# Patient Record
Sex: MALE | Race: WHITE | NOT HISPANIC OR LATINO | Employment: OTHER | URBAN - METROPOLITAN AREA
[De-identification: names, ages, dates, MRNs, and addresses within clinical notes are randomized per-mention and may not be internally consistent; named-entity substitution may affect disease eponyms.]

---

## 2020-03-13 ENCOUNTER — OFFICE VISIT (OUTPATIENT)
Dept: FAMILY MEDICINE | Facility: CLINIC | Age: 60
End: 2020-03-13
Payer: COMMERCIAL

## 2020-03-13 VITALS
WEIGHT: 196 LBS | HEART RATE: 76 BPM | HEIGHT: 72 IN | DIASTOLIC BLOOD PRESSURE: 84 MMHG | SYSTOLIC BLOOD PRESSURE: 122 MMHG | BODY MASS INDEX: 26.55 KG/M2

## 2020-03-13 DIAGNOSIS — F33.42 RECURRENT MAJOR DEPRESSIVE DISORDER, IN FULL REMISSION: Primary | ICD-10-CM

## 2020-03-13 DIAGNOSIS — N40.0 BENIGN PROSTATIC HYPERPLASIA WITHOUT LOWER URINARY TRACT SYMPTOMS: ICD-10-CM

## 2020-03-13 DIAGNOSIS — Z00.00 GENERAL MEDICAL EXAM: ICD-10-CM

## 2020-03-13 DIAGNOSIS — F51.01 PRIMARY INSOMNIA: ICD-10-CM

## 2020-03-13 PROBLEM — G47.00 INSOMNIA: Status: ACTIVE | Noted: 2020-03-13

## 2020-03-13 PROCEDURE — 99204 PR OFFICE/OUTPT VISIT, NEW, LEVL IV, 45-59 MIN: ICD-10-PCS | Mod: S$GLB,,, | Performed by: NURSE PRACTITIONER

## 2020-03-13 PROCEDURE — 3008F BODY MASS INDEX DOCD: CPT | Mod: S$GLB,,, | Performed by: NURSE PRACTITIONER

## 2020-03-13 PROCEDURE — 3008F PR BODY MASS INDEX (BMI) DOCUMENTED: ICD-10-PCS | Mod: S$GLB,,, | Performed by: NURSE PRACTITIONER

## 2020-03-13 PROCEDURE — 99204 OFFICE O/P NEW MOD 45 MIN: CPT | Mod: S$GLB,,, | Performed by: NURSE PRACTITIONER

## 2020-03-13 RX ORDER — ZOLPIDEM TARTRATE 5 MG/1
5 TABLET ORAL NIGHTLY
Qty: 90 TABLET | Refills: 1 | Status: SHIPPED | OUTPATIENT
Start: 2020-03-13 | End: 2020-09-03 | Stop reason: SDUPTHER

## 2020-03-13 RX ORDER — BUPROPION HYDROCHLORIDE 300 MG/1
300 TABLET ORAL DAILY
Qty: 90 TABLET | Refills: 1 | Status: SHIPPED | OUTPATIENT
Start: 2020-03-13 | End: 2020-09-14 | Stop reason: SDUPTHER

## 2020-03-13 RX ORDER — BUPROPION HYDROCHLORIDE 300 MG/1
1 TABLET ORAL DAILY
COMMUNITY
Start: 2019-12-14 | End: 2020-03-13 | Stop reason: SDUPTHER

## 2020-03-13 RX ORDER — ZOLPIDEM TARTRATE 5 MG/1
5 TABLET ORAL NIGHTLY
COMMUNITY
End: 2020-03-13 | Stop reason: SDUPTHER

## 2020-03-13 RX ORDER — OMEPRAZOLE 20 MG/1
20 TABLET, DELAYED RELEASE ORAL DAILY PRN
COMMUNITY
End: 2022-06-26 | Stop reason: CLARIF

## 2020-03-13 NOTE — PROGRESS NOTES
SUBJECTIVE:    Patient ID: Jean Bates is a 59 y.o. male.    Chief Complaint: [facility] (brought bottles// SW)    Pt presents to [facility]. Moved from [facility] about 6 months ago. History includes BPH and elevated PSA. Has seen Urology in the past and is requesting new referral. He is  and works part time as a [employer] . States he also has gym membership and works out 2-3 times per week. Had colonoscopy in 2018. No recent lab work.         No results found for any previous visit.       History reviewed. No pertinent past medical history.  Past Surgical History:   Procedure Laterality Date    HERNIA REPAIR       History reviewed. No pertinent family history.    Marital Status:   Alcohol History:  reports that he drinks alcohol.  Tobacco History:  reports that he has never smoked. He has never used smokeless tobacco.  Drug History:  has no drug history on file.    Review of patient's allergies indicates:  No Known Allergies    Current Outpatient Medications:     buPROPion (WELLBUTRIN XL) 300 MG 24 hr tablet, Take 1 tablet (300 mg total) by mouth once daily., Disp: 90 tablet, Rfl: 1    omeprazole (PRILOSEC OTC) 20 MG tablet, Take 20 mg by mouth daily as needed., Disp: , Rfl:     zolpidem (AMBIEN) 5 MG Tab, Take 1 tablet (5 mg total) by mouth every evening., Disp: 90 tablet, Rfl: 1    Review of Systems   Constitutional: Negative.  Negative for diaphoresis.   HENT: Negative.  Negative for congestion, ear pain, sinus pressure and sinus pain.    Eyes: Negative for pain and redness.   Respiratory: Negative for chest tightness and shortness of breath.    Cardiovascular: Negative for chest pain and palpitations.        Felt feeling in center of chest radiated to back and felt a little lightheaded. Lasted < one hour   Gastrointestinal: Negative for abdominal pain, nausea and vomiting.   Genitourinary: Negative for difficulty urinating, dysuria, frequency and urgency.    Musculoskeletal: Positive for back pain (chronic back issues). Negative for arthralgias and myalgias.   Skin: Negative for rash.   Allergic/Immunologic: Negative.    Neurological: Negative for dizziness, weakness, light-headedness and headaches.   Psychiatric/Behavioral: Negative.           Objective:      Vitals:    03/13/20 0953   BP: 122/84   Pulse: 76   Weight: 88.9 kg (196 lb)   Height: 6' (1.829 m)     Body mass index is 26.58 kg/m².  Physical Exam   Constitutional: He is oriented to person, place, and time. He appears well-developed and well-nourished. No distress.   HENT:   Head: Normocephalic and atraumatic.   Right Ear: Tympanic membrane normal.   Left Ear: Tympanic membrane normal.   Nose: Nose normal. No mucosal edema or nasal deformity.   Mouth/Throat: Oropharynx is clear and moist and mucous membranes are normal. No dental abscesses or dental caries.   Eyes: Pupils are equal, round, and reactive to light. Conjunctivae, EOM and lids are normal.   Neck: Normal range of motion. No JVD present. No tracheal tenderness present.   Cardiovascular: Normal rate, regular rhythm and normal heart sounds.   No murmur heard.  Pulmonary/Chest: Effort normal and breath sounds normal. No accessory muscle usage. No respiratory distress. He has no rhonchi.   Abdominal: Soft. Normal appearance and bowel sounds are normal. There is no tenderness.   Musculoskeletal: Normal range of motion. He exhibits no tenderness.   Neurological: He is alert and oriented to person, place, and time.   Skin: Skin is warm and dry. Capillary refill takes less than 2 seconds. No bruising and no ecchymosis noted. No erythema.   Psychiatric: He has a normal mood and affect. Cognition and memory are normal.   Vitals reviewed.        Assessment:       1. Recurrent major depressive disorder, in full remission    2. Benign prostatic hyperplasia without lower urinary tract symptoms    3. General medical exam    4. Primary insomnia    5. BMI  26.0-26.9,adult         Plan:       Recurrent major depressive disorder, in full remission  -     buPROPion (WELLBUTRIN XL) 300 MG 24 hr tablet; Take 1 tablet (300 mg total) by mouth once daily.  Dispense: 90 tablet; Refill: 1    Benign prostatic hyperplasia without lower urinary tract symptoms  -     Ambulatory referral/consult to Urology; Future; Expected date: 03/20/2020    General medical exam  -     CBC auto differential; Future; Expected date: 03/13/2020  -     Comprehensive metabolic panel; Future; Expected date: 03/13/2020  -     Lipid panel; Future; Expected date: 03/13/2020  -     PSA, Screening; Future; Expected date: 03/13/2020  -     TSH; Future; Expected date: 03/13/2020    Primary insomnia  -     zolpidem (AMBIEN) 5 MG Tab; Take 1 tablet (5 mg total) by mouth every evening.  Dispense: 90 tablet; Refill: 1    BMI 26.0-26.9,adult    Patient doing well. Plan for one yr return unless lab work is abnormal.  Follow up in about 1 year (around 3/13/2021) for Annual Physical.

## 2020-03-14 LAB
ALBUMIN SERPL-MCNC: 4.3 G/DL (ref 3.6–5.1)
ALBUMIN/GLOB SERPL: 2 (CALC) (ref 1–2.5)
ALP SERPL-CCNC: 75 U/L (ref 35–144)
ALT SERPL-CCNC: 17 U/L (ref 9–46)
AST SERPL-CCNC: 17 U/L (ref 10–35)
BASOPHILS # BLD AUTO: 62 CELLS/UL (ref 0–200)
BASOPHILS NFR BLD AUTO: 0.8 %
BILIRUB SERPL-MCNC: 0.9 MG/DL (ref 0.2–1.2)
BUN SERPL-MCNC: 13 MG/DL (ref 7–25)
BUN/CREAT SERPL: NORMAL (CALC) (ref 6–22)
CALCIUM SERPL-MCNC: 9.8 MG/DL (ref 8.6–10.3)
CHLORIDE SERPL-SCNC: 104 MMOL/L (ref 98–110)
CHOLEST SERPL-MCNC: 184 MG/DL
CHOLEST/HDLC SERPL: 3.5 (CALC)
CO2 SERPL-SCNC: 29 MMOL/L (ref 20–32)
CREAT SERPL-MCNC: 1.02 MG/DL (ref 0.7–1.33)
EOSINOPHIL # BLD AUTO: 131 CELLS/UL (ref 15–500)
EOSINOPHIL NFR BLD AUTO: 1.7 %
ERYTHROCYTE [DISTWIDTH] IN BLOOD BY AUTOMATED COUNT: 12.1 % (ref 11–15)
GFRSERPLBLD MDRD-ARVRAT: 81 ML/MIN/1.73M2
GLOBULIN SER CALC-MCNC: 2.2 G/DL (CALC) (ref 1.9–3.7)
GLUCOSE SERPL-MCNC: 86 MG/DL (ref 65–99)
HCT VFR BLD AUTO: 48.2 % (ref 38.5–50)
HDLC SERPL-MCNC: 53 MG/DL
HGB BLD-MCNC: 16.2 G/DL (ref 13.2–17.1)
LDLC SERPL CALC-MCNC: 106 MG/DL (CALC)
LYMPHOCYTES # BLD AUTO: 1609 CELLS/UL (ref 850–3900)
LYMPHOCYTES NFR BLD AUTO: 20.9 %
MCH RBC QN AUTO: 31.4 PG (ref 27–33)
MCHC RBC AUTO-ENTMCNC: 33.6 G/DL (ref 32–36)
MCV RBC AUTO: 93.4 FL (ref 80–100)
MONOCYTES # BLD AUTO: 639 CELLS/UL (ref 200–950)
MONOCYTES NFR BLD AUTO: 8.3 %
NEUTROPHILS # BLD AUTO: 5259 CELLS/UL (ref 1500–7800)
NEUTROPHILS NFR BLD AUTO: 68.3 %
NONHDLC SERPL-MCNC: 131 MG/DL (CALC)
PLATELET # BLD AUTO: 247 THOUSAND/UL (ref 140–400)
PMV BLD REES-ECKER: 10.3 FL (ref 7.5–12.5)
POTASSIUM SERPL-SCNC: 5 MMOL/L (ref 3.5–5.3)
PROT SERPL-MCNC: 6.5 G/DL (ref 6.1–8.1)
PSA SERPL-MCNC: 4.1 NG/ML
RBC # BLD AUTO: 5.16 MILLION/UL (ref 4.2–5.8)
SODIUM SERPL-SCNC: 140 MMOL/L (ref 135–146)
TRIGL SERPL-MCNC: 135 MG/DL
TSH SERPL-ACNC: 2.25 MIU/L (ref 0.4–4.5)
WBC # BLD AUTO: 7.7 THOUSAND/UL (ref 3.8–10.8)

## 2020-03-16 ENCOUNTER — TELEPHONE (OUTPATIENT)
Dept: FAMILY MEDICINE | Facility: CLINIC | Age: 60
End: 2020-03-16

## 2020-03-16 NOTE — TELEPHONE ENCOUNTER
----- Message from Mariza Mckee NP sent at 3/16/2020  9:37 AM CDT -----  Blood counts are normal. Liver and kidney function is normal. Bad cholesterol is mildly elevated but not concerning at this time. Thyroid function is normal. PSA is elevated, as we discussed it would be. Urology referral was placed during appointment.

## 2020-03-16 NOTE — PROGRESS NOTES
Blood counts are normal. Liver and kidney function is normal. Bad cholesterol is mildly elevated but not concerning at this time. Thyroid function is normal. PSA is elevated, as we discussed it would be. Urology referral was placed during appointment.

## 2020-06-23 ENCOUNTER — TELEPHONE (OUTPATIENT)
Dept: FAMILY MEDICINE | Facility: CLINIC | Age: 60
End: 2020-06-23

## 2020-06-23 ENCOUNTER — OFFICE VISIT (OUTPATIENT)
Dept: FAMILY MEDICINE | Facility: CLINIC | Age: 60
End: 2020-06-23
Payer: COMMERCIAL

## 2020-06-23 VITALS
DIASTOLIC BLOOD PRESSURE: 80 MMHG | SYSTOLIC BLOOD PRESSURE: 128 MMHG | WEIGHT: 199 LBS | HEART RATE: 72 BPM | HEIGHT: 72 IN | TEMPERATURE: 99 F | BODY MASS INDEX: 26.95 KG/M2

## 2020-06-23 DIAGNOSIS — T14.8XXA MUSCLE STRAIN: ICD-10-CM

## 2020-06-23 DIAGNOSIS — R07.9 CHEST PAIN, UNSPECIFIED TYPE: Primary | ICD-10-CM

## 2020-06-23 LAB — EKG 12-LEAD: NORMAL

## 2020-06-23 PROCEDURE — 93000 ELECTROCARDIOGRAM COMPLETE: CPT | Mod: S$GLB,,, | Performed by: NURSE PRACTITIONER

## 2020-06-23 PROCEDURE — 99213 OFFICE O/P EST LOW 20 MIN: CPT | Mod: 25,S$GLB,, | Performed by: NURSE PRACTITIONER

## 2020-06-23 PROCEDURE — 3008F BODY MASS INDEX DOCD: CPT | Mod: S$GLB,,, | Performed by: NURSE PRACTITIONER

## 2020-06-23 PROCEDURE — 3008F PR BODY MASS INDEX (BMI) DOCUMENTED: ICD-10-PCS | Mod: S$GLB,,, | Performed by: NURSE PRACTITIONER

## 2020-06-23 PROCEDURE — 93000 POCT EKG 12-LEAD: ICD-10-PCS | Mod: S$GLB,,, | Performed by: NURSE PRACTITIONER

## 2020-06-23 PROCEDURE — 99213 PR OFFICE/OUTPT VISIT, EST, LEVL III, 20-29 MIN: ICD-10-PCS | Mod: 25,S$GLB,, | Performed by: NURSE PRACTITIONER

## 2020-06-23 RX ORDER — METHOCARBAMOL 750 MG/1
750 TABLET, FILM COATED ORAL 3 TIMES DAILY
Qty: 30 TABLET | Refills: 0 | Status: SHIPPED | OUTPATIENT
Start: 2020-06-23 | End: 2020-07-03

## 2020-06-23 NOTE — TELEPHONE ENCOUNTER
"----- Message from Marilee Yancey MA sent at 6/23/2020 10:56 AM CDT -----  Regarding: Appointment Request  Pt called in to request an appt ASAP.  States he has been "having pains in the center of his check, as well as in his back, between the shoulder blades".  Report is has been going on for about 1 week.  Denies SOB, fever, pain in arms, or heart palpitations.  Please contact pt to advise and schedule.    Pt - [phone]    "

## 2020-06-24 NOTE — PROGRESS NOTES
SUBJECTIVE:    Patient ID: Jean Bates is a 60 y.o. male.    Chief Complaint: Chest Pain and Back Pain (x 1 week)    Pt presents with midline, upper back pain along with some sternal pains. Chest pain does not radiate. Has been hurting off and on for about a week. Thinks it started after working too hard at the gym last week. Denies nausea, burning, dizziness, palpitations. No cardiac history and no family cardiac history. Takes omeprazole daily for acid reflux. Stretching and moving in different directions helps with the back pain. See chiropractor. Saw last week just before symptoms began.       Office Visit on 06/23/2020   Component Date Value Ref Range Status    EKG 12-Lead 06/23/2020 NSR   Final   Office Visit on 03/13/2020   Component Date Value Ref Range Status    WBC 03/13/2020 7.7  3.8 - 10.8 Thousand/uL Final    RBC 03/13/2020 5.16  4.20 - 5.80 Million/uL Final    Hemoglobin 03/13/2020 16.2  13.2 - 17.1 g/dL Final    Hematocrit 03/13/2020 48.2  38.5 - 50.0 % Final    Mean Corpuscular Volume 03/13/2020 93.4  80.0 - 100.0 fL Final    Mean Corpuscular Hemoglobin 03/13/2020 31.4  27.0 - 33.0 pg Final    Mean Corpuscular Hemoglobin Conc 03/13/2020 33.6  32.0 - 36.0 g/dL Final    RDW 03/13/2020 12.1  11.0 - 15.0 % Final    Platelets 03/13/2020 247  140 - 400 Thousand/uL Final    MPV 03/13/2020 10.3  7.5 - 12.5 fL Final    Neutrophils Absolute 03/13/2020 5,259  1,500 - 7,800 cells/uL Final    Lymph # 03/13/2020 1,609  850 - 3,900 cells/uL Final    Mono # 03/13/2020 639  200 - 950 cells/uL Final    Eos # 03/13/2020 131  15 - 500 cells/uL Final    Baso # 03/13/2020 62  0 - 200 cells/uL Final    Neutrophils Relative 03/13/2020 68.3  % Final    Lymph% 03/13/2020 20.9  % Final    Mono% 03/13/2020 8.3  % Final    Eosinophil% 03/13/2020 1.7  % Final    Basophil% 03/13/2020 0.8  % Final    Glucose 03/13/2020 86  65 - 99 mg/dL Final    BUN, Bld 03/13/2020 13  7 - 25 mg/dL Final    Creatinine  03/13/2020 1.02  0.70 - 1.33 mg/dL Final    eGFR if non African American 03/13/2020 81  > OR = 60 mL/min/1.73m2 Final    eGFR if African American 03/13/2020 93  > OR = 60 mL/min/1.73m2 Final    BUN/Creatinine Ratio 03/13/2020 NOT APPLICABLE  6 - 22 (calc) Final    Sodium 03/13/2020 140  135 - 146 mmol/L Final    Potassium 03/13/2020 5.0  3.5 - 5.3 mmol/L Final    Chloride 03/13/2020 104  98 - 110 mmol/L Final    CO2 03/13/2020 29  20 - 32 mmol/L Final    Calcium 03/13/2020 9.8  8.6 - 10.3 mg/dL Final    Total Protein 03/13/2020 6.5  6.1 - 8.1 g/dL Final    Albumin 03/13/2020 4.3  3.6 - 5.1 g/dL Final    Globulin, Total 03/13/2020 2.2  1.9 - 3.7 g/dL (calc) Final    Albumin/Globulin Ratio 03/13/2020 2.0  1.0 - 2.5 (calc) Final    Total Bilirubin 03/13/2020 0.9  0.2 - 1.2 mg/dL Final    Alkaline Phosphatase 03/13/2020 75  35 - 144 U/L Final    AST 03/13/2020 17  10 - 35 U/L Final    ALT 03/13/2020 17  9 - 46 U/L Final    Cholesterol 03/13/2020 184  <200 mg/dL Final    HDL 03/13/2020 53  > OR = 40 mg/dL Final    Triglycerides 03/13/2020 135  <150 mg/dL Final    LDL Cholesterol 03/13/2020 106* mg/dL (calc) Final    Hdl/Cholesterol Ratio 03/13/2020 3.5  <5.0 (calc) Final    Non HDL Chol. (LDL+VLDL) 03/13/2020 131* <130 mg/dL (calc) Final    PROSTATE SPECIFIC ANTIGEN, SCR - Q* 03/13/2020 4.1* < OR = 4.0 ng/mL Final    TSH 03/13/2020 2.25  0.40 - 4.50 mIU/L Final       No past medical history on file.  Past Surgical History:   Procedure Laterality Date    HERNIA REPAIR       No family history on file.    Marital Status:   Alcohol History:  reports current alcohol use.  Tobacco History:  reports that he has never smoked. He has never used smokeless tobacco.  Drug History:  has no history on file for drug.    Review of patient's allergies indicates:  No Known Allergies    Current Outpatient Medications:     buPROPion (WELLBUTRIN XL) 300 MG 24 hr tablet, Take 1 tablet (300 mg total) by mouth  once daily., Disp: 90 tablet, Rfl: 1    omeprazole (PRILOSEC OTC) 20 MG tablet, Take 20 mg by mouth daily as needed., Disp: , Rfl:     zolpidem (AMBIEN) 5 MG Tab, Take 1 tablet (5 mg total) by mouth every evening., Disp: 90 tablet, Rfl: 1    methocarbamoL (ROBAXIN) 750 MG Tab, Take 1 tablet (750 mg total) by mouth 3 (three) times daily. for 10 days, Disp: 30 tablet, Rfl: 0    Review of Systems   Constitutional: Negative for activity change, appetite change, chills, fatigue and fever.   HENT: Negative.    Respiratory: Negative for cough, chest tightness and shortness of breath.    Cardiovascular: Positive for chest pain. Negative for palpitations and leg swelling.   Gastrointestinal: Negative.  Negative for constipation and nausea.   Musculoskeletal: Positive for back pain. Negative for arthralgias, gait problem, joint swelling and neck pain.   Neurological: Negative for dizziness, weakness, light-headedness and headaches.          Objective:      Vitals:    06/23/20 1443   BP: 128/80   Pulse: 72   Temp: 98.6 °F (37 °C)   Weight: 90.3 kg (199 lb)   Height: 6' (1.829 m)     Body mass index is 26.99 kg/m².  Physical Exam  Constitutional:       General: He is not in acute distress.     Appearance: Normal appearance.   HENT:      Head: Normocephalic and atraumatic.   Cardiovascular:      Rate and Rhythm: Normal rate and regular rhythm.      Heart sounds: Normal heart sounds.   Pulmonary:      Effort: Pulmonary effort is normal. No respiratory distress.      Breath sounds: Normal breath sounds.   Chest:       Abdominal:      General: Bowel sounds are normal.   Musculoskeletal: Normal range of motion.      Thoracic back: He exhibits tenderness.        Back:    Skin:     General: Skin is warm and dry.      Capillary Refill: Capillary refill takes less than 2 seconds.   Neurological:      Mental Status: He is alert and oriented to person, place, and time.   Psychiatric:         Mood and Affect: Mood normal.            Assessment:       1. Chest pain, unspecified type    2. Muscle strain         Plan:       Chest pain, unspecified type  -     POCT EKG 12-LEAD (NOT FOR [facility] USE)    Muscle strain  -     methocarbamoL (ROBAXIN) 750 MG Tab; Take 1 tablet (750 mg total) by mouth 3 (three) times daily. for 10 days  Dispense: 30 tablet; Refill: 0    EKG normal. Low suspicion for cardiac or pulmonary process. Advised likely muscle strain. Given Robaxin and plans to f/u with chiropractor. Advised watch for symptoms such as burning chest pain that radiates to arm, dizziness, difficulty breathing, palpitations.     Follow up if symptoms worsen or fail to improve.

## 2020-09-03 ENCOUNTER — TELEPHONE (OUTPATIENT)
Dept: FAMILY MEDICINE | Facility: CLINIC | Age: 60
End: 2020-09-03

## 2020-09-03 DIAGNOSIS — F51.01 PRIMARY INSOMNIA: ICD-10-CM

## 2020-09-03 RX ORDER — ZOLPIDEM TARTRATE 5 MG/1
5 TABLET ORAL NIGHTLY
Qty: 90 TABLET | Refills: 1 | Status: SHIPPED | OUTPATIENT
Start: 2020-09-09 | End: 2020-11-05 | Stop reason: SDUPTHER

## 2020-09-03 NOTE — TELEPHONE ENCOUNTER
----- Message from Angela Balderrama sent at 9/3/2020  2:37 PM CDT -----  Regarding: refills  [facility]   Ph[facility]   Pt [phone]

## 2020-09-03 NOTE — TELEPHONE ENCOUNTER
----- Message from Dara Chadwick sent at 9/3/2020 11:53 AM CDT -----  - pt needs refill   [phone]

## 2020-09-10 ENCOUNTER — TELEPHONE (OUTPATIENT)
Dept: FAMILY MEDICINE | Facility: CLINIC | Age: 60
End: 2020-09-10

## 2020-09-10 NOTE — TELEPHONE ENCOUNTER
Pt has met his max fills for the year.  They were able to fill qty of 60 this time but the pt will run out in Nov and his ins does not start over until 12-1-2020.  sh

## 2020-09-10 NOTE — TELEPHONE ENCOUNTER
They will only cover 60 total or 60 per fill? Do we just need to make sure we write for 30 day refills instead of 90? I do not understand exactly what the problem is. It looks like they have been filling 90 at a time for him for the last year.

## 2020-09-10 NOTE — TELEPHONE ENCOUNTER
----- Message from Edna Hawthorne sent at 9/10/2020  1:17 PM CDT -----  Received notification from [facility] the patients insurance will only cover a quantity of 60 on the Zolpidem 5 mg tablets.  I confirmed with Dara @ the pharm the patient has medication now, the PA request is for a future fill, that will be due in Dec since he will run out due to quantity limits.   Please advise?

## 2020-09-10 NOTE — TELEPHONE ENCOUNTER
So he can still pay cash and get the medication? If that's the case he made just need to do that and use [facility] for one month until insurance will start over in January.

## 2020-09-10 NOTE — TELEPHONE ENCOUNTER
Mariza looks like maybe he'd have to pay for a month out of pocket then. I am not understanding this either.. Edna sent to me

## 2020-09-10 NOTE — TELEPHONE ENCOUNTER
[facility] shows it under $10.  I called and lmor notifying the pt and to call back if any questions. Sh

## 2020-09-14 DIAGNOSIS — F33.42 RECURRENT MAJOR DEPRESSIVE DISORDER, IN FULL REMISSION: ICD-10-CM

## 2020-09-14 RX ORDER — BUPROPION HYDROCHLORIDE 300 MG/1
300 TABLET ORAL DAILY
Qty: 90 TABLET | Refills: 1 | Status: SHIPPED | OUTPATIENT
Start: 2020-09-14 | End: 2020-11-05 | Stop reason: SDUPTHER

## 2020-09-14 NOTE — TELEPHONE ENCOUNTER
----- Message from Edna Hawthorne sent at 9/14/2020 11:10 AM CDT -----  Pt calling for refill on Bupropion to [facility]  # [phone]

## 2020-11-05 ENCOUNTER — OFFICE VISIT (OUTPATIENT)
Dept: FAMILY MEDICINE | Facility: CLINIC | Age: 60
End: 2020-11-05
Payer: COMMERCIAL

## 2020-11-05 VITALS
OXYGEN SATURATION: 100 % | BODY MASS INDEX: 26.95 KG/M2 | HEIGHT: 72 IN | TEMPERATURE: 98 F | WEIGHT: 199 LBS | DIASTOLIC BLOOD PRESSURE: 78 MMHG | HEART RATE: 84 BPM | SYSTOLIC BLOOD PRESSURE: 128 MMHG

## 2020-11-05 DIAGNOSIS — F33.42 RECURRENT MAJOR DEPRESSIVE DISORDER, IN FULL REMISSION: ICD-10-CM

## 2020-11-05 DIAGNOSIS — F51.01 PRIMARY INSOMNIA: ICD-10-CM

## 2020-11-05 DIAGNOSIS — R06.09 DOE (DYSPNEA ON EXERTION): Primary | ICD-10-CM

## 2020-11-05 PROCEDURE — 99213 PR OFFICE/OUTPT VISIT, EST, LEVL III, 20-29 MIN: ICD-10-PCS | Mod: S$GLB,,, | Performed by: NURSE PRACTITIONER

## 2020-11-05 PROCEDURE — 99213 OFFICE O/P EST LOW 20 MIN: CPT | Mod: S$GLB,,, | Performed by: NURSE PRACTITIONER

## 2020-11-05 PROCEDURE — 3008F PR BODY MASS INDEX (BMI) DOCUMENTED: ICD-10-PCS | Mod: S$GLB,,, | Performed by: NURSE PRACTITIONER

## 2020-11-05 PROCEDURE — 3008F BODY MASS INDEX DOCD: CPT | Mod: S$GLB,,, | Performed by: NURSE PRACTITIONER

## 2020-11-05 RX ORDER — CITALOPRAM 20 MG/1
20 TABLET, FILM COATED ORAL DAILY
Qty: 90 TABLET | Refills: 1 | Status: SHIPPED | OUTPATIENT
Start: 2020-11-05 | End: 2021-05-11 | Stop reason: SDUPTHER

## 2020-11-05 RX ORDER — BUPROPION HYDROCHLORIDE 150 MG/1
150 TABLET ORAL DAILY
Qty: 90 TABLET | Refills: 1 | Status: SHIPPED | OUTPATIENT
Start: 2020-11-05 | End: 2021-05-11 | Stop reason: SDUPTHER

## 2020-11-05 RX ORDER — ZOLPIDEM TARTRATE 5 MG/1
5 TABLET ORAL NIGHTLY
Qty: 30 TABLET | Refills: 0 | Status: SHIPPED | OUTPATIENT
Start: 2020-11-05 | End: 2020-12-09 | Stop reason: SDUPTHER

## 2020-11-05 NOTE — PROGRESS NOTES
SUBJECTIVE:    Patient ID: Jean Bates is a 60 y.o. male.    Chief Complaint: Shortness of Breath (with activity a few mths, no bottles, went over meds verbally, Flu shot declined// SW)    Pt presents with multiple complaints. He is concerned that he becomes quickly short of breath with activity. States he feels find with moderate exercise such as walking 30 minutes on a treadmill at a speed of 3-4 mph. However, if he runs across his yard with his dog, he becomes very short winded and has to lie down for several minutes. Was working in yard trimming [employer] with arms overhead and felt SOB. Had to stop and lay down. No dizziness, syncope, chest pain, or heart palpitations. Never smoker. No history of asthma. No family or personal cardiac history. Would also like to discuss changing depression medication. Currently taking Wellbutrin 300mg daily. He is unsure if it is really still doing anything at this point. Still having a lot of mood swings. A lot of highs and lows in mood throughout day. Worried it may be something other than depression. Denies self-harm or thoughts of suicide.       Office Visit on 06/23/2020   Component Date Value Ref Range Status    EKG 12-Lead 06/23/2020 NSR   Final       History reviewed. No pertinent past medical history.  Past Surgical History:   Procedure Laterality Date    HERNIA REPAIR       History reviewed. No pertinent family history.    Marital Status:   Alcohol History:  reports current alcohol use.  Tobacco History:  reports that he has never smoked. He has never used smokeless tobacco.  Drug History:  has no history on file for drug.    Review of patient's allergies indicates:  No Known Allergies    Current Outpatient Medications:     buPROPion (WELLBUTRIN XL) 150 MG TB24 tablet, Take 1 tablet (150 mg total) by mouth once daily., Disp: 90 tablet, Rfl: 1    omeprazole (PRILOSEC OTC) 20 MG tablet, Take 20 mg by mouth daily as needed., Disp: , Rfl:     zolpidem (AMBIEN)  5 MG Tab, Take 1 tablet (5 mg total) by mouth every evening., Disp: 30 tablet, Rfl: 0    citalopram (CELEXA) 20 MG tablet, Take 1 tablet (20 mg total) by mouth once daily., Disp: 90 tablet, Rfl: 1    Review of Systems   Constitutional: Positive for activity change. Negative for fatigue and fever.   HENT: Negative for congestion, sinus pressure and sinus pain.    Respiratory: Positive for shortness of breath. Negative for cough, chest tightness and wheezing.    Cardiovascular: Negative for chest pain and palpitations.   Neurological: Negative for headaches.   Psychiatric/Behavioral: Positive for decreased concentration, dysphoric mood and sleep disturbance. Negative for agitation, self-injury and suicidal ideas. The patient is nervous/anxious.           Objective:      Vitals:    11/05/20 0905   BP: 128/78   Pulse: 84   Temp: 97.9 °F (36.6 °C)   SpO2: 100%   Weight: 90.3 kg (199 lb)   Height: 6' (1.829 m)     Body mass index is 26.99 kg/m².  Physical Exam  Constitutional:       Appearance: Normal appearance.   HENT:      Head: Normocephalic and atraumatic.      Right Ear: Tympanic membrane normal.      Left Ear: Tympanic membrane normal.   Cardiovascular:      Rate and Rhythm: Normal rate and regular rhythm.      Heart sounds: Normal heart sounds.   Pulmonary:      Effort: Pulmonary effort is normal. No respiratory distress.      Breath sounds: Normal breath sounds.   Musculoskeletal: Normal range of motion.   Skin:     General: Skin is warm and dry.      Capillary Refill: Capillary refill takes less than 2 seconds.   Neurological:      Mental Status: He is alert.   Psychiatric:         Mood and Affect: Mood normal.         Behavior: Behavior normal.         Thought Content: Thought content normal.           Assessment:       1. HARRINGTON (dyspnea on exertion)    2. Recurrent major depressive disorder, in full remission    3. Primary insomnia         Plan:       HARRINGTON (dyspnea on exertion)  -     Ambulatory referral/consult  to Pulmonology; Future; Expected date: 11/12/2020    Recurrent major depressive disorder, in full remission  -     buPROPion (WELLBUTRIN XL) 150 MG TB24 tablet; Take 1 tablet (150 mg total) by mouth once daily.  Dispense: 90 tablet; Refill: 1  -     citalopram (CELEXA) 20 MG tablet; Take 1 tablet (20 mg total) by mouth once daily.  Dispense: 90 tablet; Refill: 1    Primary insomnia  -     zolpidem (AMBIEN) 5 MG Tab; Take 1 tablet (5 mg total) by mouth every evening.  Dispense: 30 tablet; Refill: 0    Will consider Pulm referral and PFTs at this point. If no abnormalities, consider cardiac workup.     Will decrease Wellbutrin and add Celexa. If no improvement, consider referral to Psych for further workup.  Follow up in about 1 month (around 12/5/2020) for med check.

## 2020-11-09 ENCOUNTER — HOSPITAL ENCOUNTER (OUTPATIENT)
Dept: RADIOLOGY | Facility: HOSPITAL | Age: 60
Discharge: HOME | End: 2020-11-09
Attending: NURSE PRACTITIONER
Payer: COMMERCIAL

## 2020-11-09 ENCOUNTER — TELEPHONE (OUTPATIENT)
Dept: PULMONOLOGY | Facility: CLINIC | Age: 60
End: 2020-11-09

## 2020-11-09 ENCOUNTER — OFFICE VISIT (OUTPATIENT)
Dept: PULMONOLOGY | Facility: CLINIC | Age: 60
End: 2020-11-09
Payer: COMMERCIAL

## 2020-11-09 VITALS
SYSTOLIC BLOOD PRESSURE: 130 MMHG | TEMPERATURE: 98 F | DIASTOLIC BLOOD PRESSURE: 73 MMHG | OXYGEN SATURATION: 98 % | WEIGHT: 202 LBS | HEART RATE: 101 BPM | BODY MASS INDEX: 27.36 KG/M2 | HEIGHT: 72 IN

## 2020-11-09 DIAGNOSIS — R06.09 DOE (DYSPNEA ON EXERTION): ICD-10-CM

## 2020-11-09 PROCEDURE — 99214 PR OFFICE/OUTPT VISIT, EST, LEVL IV, 30-39 MIN: ICD-10-PCS | Mod: S$GLB,,, | Performed by: NURSE PRACTITIONER

## 2020-11-09 PROCEDURE — 99214 OFFICE O/P EST MOD 30 MIN: CPT | Mod: S$GLB,,, | Performed by: NURSE PRACTITIONER

## 2020-11-09 PROCEDURE — 71046 X-RAY EXAM CHEST 2 VIEWS: CPT | Mod: TC

## 2020-11-09 NOTE — LETTER
November 9, 2020      Mariza Mckee, NP  [address] [facility]  [address]  [place] [address]           [facility] - Pulmonology  [address]  [place] [address]  Phone: [phone]  Fax: [phone]          Patient: Jean Bates   MR Number: [id number]   YOB: 1960   Date of Visit: 11/9/2020       Dear Mariza Mckee:    Thank you for referring Jean Bates to me for evaluation. Attached you will find relevant portions of my assessment and plan of care.    If you have questions, please do not hesitate to call me. I look forward to following Jean Bates along with you.    Sincerely,    Gala Ford, [facility]    Enclosure  CC:  No Recipients    If you would like to receive this communication electronically, please contact [email or web address] or [phone] to request more information on [facility] access.    For providers and/or their staff who would like to refer a patient to [facility], please contact us through our one-stop-shop provider referral line, [facility], at [phone].    If you feel you have received this communication in error or would no longer like to receive these types of communications, please e-mail [email or web address]

## 2020-11-09 NOTE — PROGRESS NOTES
SUBJECTIVE:    Patient ID: Jean Bates is a 60 y.o. male.    Chief Complaint: Establish Care and Shortness of Breath    HPI     Patient here today to be evaluated for dyspnea.   He states it is not all of the time but very random but mostly with outdoor activities, and when his arms are raised.  He moved here a year ago from [place].  He does not exercise regularly. He has no smoking history.   Past Medical History:   Diagnosis Date    Depression     GERD (gastroesophageal reflux disease)      Past Surgical History:   Procedure Laterality Date    HERNIA REPAIR       Family History   Problem Relation Age of Onset    Alzheimer's disease Mother     No Known Problems Father         Social History:   Marital Status:   Occupation: retired   Alcohol History:  reports current alcohol use.  Tobacco History:  reports that he has never smoked. He has never used smokeless tobacco.  Drug History:  reports no history of drug use.    Review of patient's allergies indicates:  No Known Allergies    Current Outpatient Medications   Medication Sig Dispense Refill    buPROPion (WELLBUTRIN XL) 150 MG TB24 tablet Take 1 tablet (150 mg total) by mouth once daily. 90 tablet 1    citalopram (CELEXA) 20 MG tablet Take 1 tablet (20 mg total) by mouth once daily. 90 tablet 1    omeprazole (PRILOSEC OTC) 20 MG tablet Take 20 mg by mouth daily as needed.      zolpidem (AMBIEN) 5 MG Tab Take 1 tablet (5 mg total) by mouth every evening. 30 tablet 0     No current facility-administered medications for this visit.            Review of Systems  General: Feeling Well.  Eyes: Vision is good.  ENT:  No sinusitis or pharyngitis.   Heart:: No chest pain or palpitations.  Lungs: no cough, dyspnea with outdoor activities and with over hand motions   GI: reflux  : No dysuria, hesitancy, or nocturia.  Musculoskeletal:back pain occasionally   Skin: No lesions or rashes.  Neuro: No headaches or neuropathy.  Lymph: No edema or  adenopathy.  Psych depression.  Endo: No weight change.    OBJECTIVE:      /73 (BP Location: Left arm, Patient Position: Sitting, BP Method: Medium (Manual))   Pulse 101   Temp 97.5 °F (36.4 °C)   Ht 6' (1.829 m)   Wt 91.6 kg (202 lb)   SpO2 98%   BMI 27.40 kg/m²     Physical Exam  GENERAL: Older patient in no distress.  HEENT: Pupils equal and reactive. Extraocular movements intact. Nose intact.      Pharynx moist.  NECK: Supple.   HEART: Regular rate and rhythm. No murmur or gallop auscultated.  LUNGS: Clear to auscultation and percussion. Lung excursion symmetrical. No change in fremitus. No adventitial noises.  ABDOMEN: Bowel sounds present. Non-tender, no masses palpated.  EXTREMITIES: Normal muscle tone and joint movement, no cyanosis or clubbing.   LYMPHATICS: No adenopathy palpated, no edema.  SKIN: Dry, intact, no lesions.   NEURO: Cranial nerves II-XII intact. Motor strength 5/5 bilaterally, upper and lower extremities.  PSYCH: Appropriate affect.    Assessment:       1. HARRINGTON (dyspnea on exertion)          Plan:       PFT and 6 minute walk  If PFT is normal will do methacholine  Chest xray     Follow up in about 6 weeks (around 12/21/2020).

## 2020-11-12 ENCOUNTER — TELEPHONE (OUTPATIENT)
Dept: UROLOGY | Facility: CLINIC | Age: 60
End: 2020-11-12

## 2020-11-12 NOTE — TELEPHONE ENCOUNTER
Called pt regarding referral received for Elevated psa. Pt was orginally scheduled in Jan and called to offer a earlier appt. Pt accepted for 11/23 @ 930 am. I inquired about pts urological history pt voiced that he he has seen previously Dr Hi Gutierrez MD in [facility]. Ph.[phone] Fx. [phone] will call and send fax to inquire about urological history.

## 2020-11-19 ENCOUNTER — HOSPITAL ENCOUNTER (OUTPATIENT)
Dept: PULMONOLOGY | Facility: HOSPITAL | Age: 60
Discharge: HOME | End: 2020-11-19
Attending: NURSE PRACTITIONER
Payer: COMMERCIAL

## 2020-11-19 VITALS — BODY MASS INDEX: 27.36 KG/M2 | HEIGHT: 72 IN | WEIGHT: 202 LBS

## 2020-11-19 DIAGNOSIS — R06.09 DOE (DYSPNEA ON EXERTION): ICD-10-CM

## 2020-11-19 PROCEDURE — 94010 BREATHING CAPACITY TEST: CPT

## 2020-11-19 PROCEDURE — 94727 GAS DIL/WSHOT DETER LNG VOL: CPT

## 2020-11-19 PROCEDURE — 94729 DIFFUSING CAPACITY: CPT

## 2020-11-19 NOTE — CARE UPDATE
11/19/20 1423   6MW Test   Ordering Provider Gala Ford FNP   Diagnosis Shortness of Breath   Height 6' (1.829 m)   Weight 91.6 kg (202 lb)   BMI (Calculated) 27.4   Predicted Distance 423.83   Patient Race    6MWT Status completed without stopping   Patient Reported No complaints   Was O2 used? No   6MW Distance walked (feet) 1800 feet   Distance walked (meters) 548.64 meters   Did patient stop? No   Type of assistive device(s) used? no assistive devices   Is extra documentation required for this patient? Yes   Pre-Exercise   Oxygen Saturation 96 %   Supplemental Oxygen Room Air   Heart Rate 93 bpm   Bartolome Dyspnea Rating  nothing at all   Post Exercise   Oxygen Saturation 98 %   Supplemental Oxygen Room Air   Heart Rate 100 bpm   Bartolome Dyspnea Rating  very, very light (just noticeable)   Recovery   Oxygen Saturation 96 %   Supplemental Oxygen Room Air   Heart Rate 98 bpm   Bartolome Dyspnea Rating  very, very light (just noticeable)   Interpretation   Is procedure ready for interpretation? Yes   Did the patient stop or pause? No   Total Laps Walked 9   Final Partial Lap Distance (feet) 0 feet   Total Distance Feet (Calculated) 1800 feet   Total Distance Meters (Calculated) 548.64 meters   Predicted Distance Meters (Calculated) 618.11 meters   Percentage of Predicted (Calculated) 88.76   Peak VO2 (Calculated) 20.44   Mets 5.84   Comments This is a Non-Hypoxemic 6 min. walk. Patient did not qualify for Home Oxygen   Oxygen Qualification   Oxygen Qualification? No

## 2020-11-22 NOTE — PROGRESS NOTES
[facility] Urology Consult:  Consult from: DARLIN Mckee  Consult for: elevated psa    Jean Bates is a 59 yo M referred by DARLIN Mckee for evaluation of elevated psa    He recently est care with DARLIN Mckee in March 2020 after moving from [place] noting history of BPH and elevated psa, and requested referral.  Routine screening PSA 3/13/20 was 4.1. Did not schedule eval at that time.   Last seen by DARLIN Mckee 11/5/20 noting concerns about SOB on exertion and mood shifts not managed with his wellbutrin of which dose decreased, celexa added, psych referral placed, and referred to pulm. Recent eval by pulmonology for HARRINGTON and had nonhypoxemic walk test with resp therapy, and has PFTs pending.    Record review prior Urology: Dr Calvin Gutierrez, [place], [place]  In June 2014 he had psa 4.46 (9% free), with history of negative prostate biopsy in 2006, and long history of prostatitis/pelvic irritation noting a high amount of stress causing flares of his prostatitis, improved by NSAIDs. PSA as high as 5.3 in Jan 2017 and returned to 4.9 by July 2017. Has not had baseline LUTS. PVR has been 0cc. UAs negative. Deferred biopsy.  PSA History: 4.46 (9%) 6/14; 4.14 10/14; 4.9 1/15 and 6/15; 4.6 12/15; 3.7 6/16; 5.3 1/17; 4.9 7/17 1/29/18 returned for interval rise in psa to 5.6. Again deferred biopsy and was considering 4k score and future psa surveillance  2/6/18: free psa 13%     Does report having 4K score done, and noted to be 1% risk at that time.  Felt that for years there were things that irritated his prostate, such as red meat, and sometimes ejaculation  Has had some perineal discomfort after ejaculation 3-4x in last year. Currently none  No hematuria  AUA SS: 14/3 (4 frequency; 3: urgency; 2: intermittency; weak stream; 1: emptying, straining, sleeping)  Does have dribbling at start of stream before good stream kicks in.    Past Medical History:   Diagnosis Date    Depression     GERD (gastroesophageal reflux  disease)        Past Surgical History:   Procedure Laterality Date    HERNIA REPAIR         Family History   Problem Relation Age of Onset    Alzheimer's disease Mother     No Known Problems Father        Social History     Socioeconomic History    Marital status:      Spouse name: Not on file    Number of children: Not on file    Years of education: Not on file    Highest education level: Not on file   Occupational History    Not on file   Social Needs    Financial resource strain: Not on file    Food insecurity     Worry: Not on file     Inability: Not on file    Transportation needs     Medical: Not on file     Non-medical: Not on file   Tobacco Use    Smoking status: Never Smoker    Smokeless tobacco: Never Used   Substance and Sexual Activity    Alcohol use: Yes    Drug use: Never    Sexual activity: Yes     Partners: Female   Lifestyle    Physical activity     Days per week: Not on file     Minutes per session: Not on file    Stress: Not on file   Relationships    Social connections     Talks on phone: Not on file     Gets together: Not on file     Attends Latter-day service: Not on file     Active member of club or organization: Not on file     Attends meetings of clubs or organizations: Not on file     Relationship status: Not on file   Other Topics Concern    Not on file   Social History Narrative    Not on file       Review of patient's allergies indicates:  No Known Allergies    Medications Reviewed: see MAR    ROS:    Constitutional: denies fevers, chills, night sweats, fatigue, malaise  Respiratory: negative for cough, shortness of breath, wheezing, dyspnea.  Cardiovascular: + for high blood pressure, negative for chest pain, varicose veins, ankle swelling, palpitations, syncope.  GI: negative for abdominal pain, heartburn, indigestion, nausea, vomiting, constipation, diarrhea, blood in stool.   Urology: as noted above in HPI  Endocrinology: negative for cold intolerance,  excessive thirst, not feeling tired/sluggish, no heat intolerance.   Hematology/Lymph: negative for easy bleeding, easy bruising, swollen glands.  Musculoskeletal: negative for back pain, joint pain, joint swelling, neck pain.  Allergy-Immunology: negative for seasonal allergies, negative for unusual infections.   Skin: negative for boils, breast lumps, hives, itching, rash.   Neurology: negative for, dizziness, headache, tingling/numbness, tremors.   Psych: satisfied with life; negative for, anxiety, depression, suicidal thoughts.     PHYSICAL EXAM:    Vitals:    11/23/20 0939   BP: (!) 152/80   Pulse: 90     Body mass index is 27.18 kg/m². Weight: 90.9 kg (200 lb 6.4 oz) Height: 6' (182.9 cm)       General: Alert, cooperative, no distress, appears stated age  Head: Normocephalic, without obvious abnormality, atraumatic  Neck: no masses, no thyromegaly, no lymphadenopathy  Eyes: PERRL, conjunctiva/corneas clear  Lungs: Respirations unlabored, normal effort, no accessory muscle use  CV: Warm and well perfused extremities  Abdomen: Soft, non-tender, no CVA tenderness, no hepatosplenomegaly, no hernia  Penis: phallus normal, circumcised, well cared for, no plaques or lesions.   Scrotum: no cysts, no lesions, no rash, no hydrocele.   Epididymes: normal, nontender, symmetrical, no masses or cysts.   Testes: normal, both descended, no masses.   Urethra: palpably normal with orthotopic meatus of normal size    MICHAELA: normal sphincter tone, no masses, no hemmorrhoids   PROSTATE: 25-30g, no nodules, non-tender, symmetrical.   Extremities: Extremities normal, atraumatic, no cyanosis or edema  Skin: Normal color, texture, and turgor, no rashes or lesions  Psych: Appropriate, well oriented, normal affect, normal mood  Neuro: Non-focal      LABS:    Recent Results (from the past 336 hour(s))   POCT URINE DIPSTICK WITHOUT MICROSCOPE    Collection Time: 11/23/20  9:55 AM   Result Value Ref Range    Color, UA Yellow     pH, UA 6      WBC, UA neg     Nitrite, UA neg     Protein neg     Glucose, UA neg     Ketones, UA tr     Urobilinogen, UA 0.2     Bilirubin small     Blood, UA neg     Clarity, UA Clear     Spec Grav UA 1.025          Assessment/Diagnosis:    1. Elevated PSA  POCT URINE DIPSTICK WITHOUT MICROSCOPE    PSA, Total and Free       Plans:  I had a long discussion with the patient regarding the natural history of cancer in men as well as when diagnostics are indicated. We also discussed differential for elevated psa which also includes benign enlargement and prostatitis.  We did discuss that an elevated PSA is considered a PSA greater than 4 because statistically 20% of people in this value range are found to have prostate cancer, however we also discussed a bit about PSA velocity and trends and age specific psa elevations, and significance of free psa.     Given his persistent psa elevation over the last 6+ years, all of which were significant age-adjusted elevations, as well as all free psa values on record low/concerning, I therefore recommended prostate biopsy to evaluate for underlying malignancy.   However, given relatively stable elevation over multiple years, quite reasonable to reassess with free and total PSA at this time.  His last PSA on record was in March 2020 greater than 6 months ago and therefore today will get a free and total PSA on his way out I will chart check the results.  If he has a persistent PSA elevation, and/or his free PSA is a concerning low value, we did discuss recommendation to proceed with biopsy.  As per below we reviewed prostate biopsy in detail and prep instruction sheet was provided.  Has had some signs and symptoms of prostatitis or prostate inflammation in the past, especially the post ejaculatory discomfort, however this has not occurred and months and he is asymptomatic at this time.    We discussed biopsy and in detail, including 1% risk of infectious complications including sepsis but that  it is an otherwise safe diagnostic procedure with expected hematuria hematospermia after.   I went over the details of a transrectal ultrasound-guided biopsy of the prostate, and described the technique in detail.   The patient will be given local injection anesthetic to block the prostate so as to minimize any pain. 12-14 biopsy specimens will be taken. These will be sent for histopathology analysis.   Complications include bleeding, fever and chills. He was also instructed to watch for any signs of fever. If he does have any fever or chills after, he was advised to come to the emergency room right away for intravenous antibiotics and possible admission to the hospital. He is to refrain from any strenuous activity including sexual activity for the next 72 hours after biopsy. He was also advised that he may have blood while urinating, during bowel movements as well as during ejaculations. He was given a prebiopsy/postbiopsy instruction sheet was reminding him to avoid aspirin and blood thinners for 7 days prior, take the Rxed antibiotics the day before, day of, day after biopsy, and perform a fleet enema at home morning of biopsy. All questions he had were answered in detail.     If he has a PSA elevation with a very normal free PSA, reasonable to continue to follow on a q.6 months basis for close follow-up of PSA trend.  On digital rectal exam estimated prostate size does not support PSA density, however we did discuss the pitfalls of digital rectal exam, as well as pitfalls of all risk stratification testing such as blood work, genetic analysis, for case core, etc, noting tissue and pathology is the only definitive means to assess.  We briefly discuss the utility of prostate MRI, however also noted a negative prostate MRI does not rule out prostate cancer.    He does have mild-to-moderate obstructive lower urinary tract symptoms as well but feels comfortable with his voiding symptoms at this time and we did  discuss BPH management strategies such as observation, medical management, minimally invasive in surgical interventions.  Certainly if he does present for prostate biopsy we offered concurrent cystoscopic evaluation to determine level of obstruction to help guide further recommendations about managing his LUTS in the future.  Even if that med starting medical therapy at that time of obstruction present, as he would like to defer medical therapy at this time in favor of the observation.  He will consider this discussion, and when chart checking PSA to make further decision about biopsy versus surveillance, will indicated he would like to proceed with cystoscopy at that time or continue to observe.    85 mins spent in encounter, over half in counseling, and including review of prior urologic record from [place].

## 2020-11-23 ENCOUNTER — OFFICE VISIT (OUTPATIENT)
Dept: UROLOGY | Facility: CLINIC | Age: 60
End: 2020-11-23
Payer: COMMERCIAL

## 2020-11-23 ENCOUNTER — TELEPHONE (OUTPATIENT)
Dept: PULMONOLOGY | Facility: CLINIC | Age: 60
End: 2020-11-23

## 2020-11-23 ENCOUNTER — LAB VISIT (OUTPATIENT)
Dept: LAB | Facility: HOSPITAL | Age: 60
End: 2020-11-23
Attending: UROLOGY
Payer: COMMERCIAL

## 2020-11-23 VITALS
HEART RATE: 90 BPM | DIASTOLIC BLOOD PRESSURE: 80 MMHG | SYSTOLIC BLOOD PRESSURE: 152 MMHG | WEIGHT: 200.38 LBS | BODY MASS INDEX: 27.14 KG/M2 | HEIGHT: 72 IN

## 2020-11-23 DIAGNOSIS — R97.20 ELEVATED PSA: Primary | ICD-10-CM

## 2020-11-23 DIAGNOSIS — R06.00 DYSPNEA, UNSPECIFIED TYPE: Primary | ICD-10-CM

## 2020-11-23 DIAGNOSIS — R97.20 ELEVATED PSA: ICD-10-CM

## 2020-11-23 LAB
BILIRUB SERPL-MCNC: ABNORMAL MG/DL
BLOOD URINE, POC: ABNORMAL
CLARITY, POC UA: CLEAR
COLOR, POC UA: YELLOW
GLUCOSE UR QL STRIP: ABNORMAL
KETONES UR QL STRIP: ABNORMAL
LEUKOCYTE ESTERASE URINE, POC: ABNORMAL
NITRITE, POC UA: ABNORMAL
PH, POC UA: 6
PROTEIN, POC: ABNORMAL
SPECIFIC GRAVITY, POC UA: 1.02
UROBILINOGEN, POC UA: 0.2

## 2020-11-23 PROCEDURE — 3008F BODY MASS INDEX DOCD: CPT | Mod: CPTII,S$GLB,, | Performed by: UROLOGY

## 2020-11-23 PROCEDURE — 99999 PR PBB SHADOW E&M-EST. PATIENT-LVL III: CPT | Mod: PBBFAC,,, | Performed by: UROLOGY

## 2020-11-23 PROCEDURE — 1126F PR PAIN SEVERITY QUANTIFIED, NO PAIN PRESENT: ICD-10-PCS | Mod: S$GLB,,, | Performed by: UROLOGY

## 2020-11-23 PROCEDURE — 81002 URINALYSIS NONAUTO W/O SCOPE: CPT | Mod: S$GLB,,, | Performed by: UROLOGY

## 2020-11-23 PROCEDURE — 99245 PR OFFICE CONSULTATION,LEVEL V: ICD-10-PCS | Mod: 25,S$GLB,, | Performed by: UROLOGY

## 2020-11-23 PROCEDURE — 99999 PR PBB SHADOW E&M-EST. PATIENT-LVL III: ICD-10-PCS | Mod: PBBFAC,,, | Performed by: UROLOGY

## 2020-11-23 PROCEDURE — 1126F AMNT PAIN NOTED NONE PRSNT: CPT | Mod: S$GLB,,, | Performed by: UROLOGY

## 2020-11-23 PROCEDURE — 3008F PR BODY MASS INDEX (BMI) DOCUMENTED: ICD-10-PCS | Mod: CPTII,S$GLB,, | Performed by: UROLOGY

## 2020-11-23 PROCEDURE — 81002 POCT URINE DIPSTICK WITHOUT MICROSCOPE: ICD-10-PCS | Mod: S$GLB,,, | Performed by: UROLOGY

## 2020-11-23 PROCEDURE — 36415 COLL VENOUS BLD VENIPUNCTURE: CPT

## 2020-11-23 PROCEDURE — 84154 ASSAY OF PSA FREE: CPT

## 2020-11-23 PROCEDURE — 99245 OFF/OP CONSLTJ NEW/EST HI 55: CPT | Mod: 25,S$GLB,, | Performed by: UROLOGY

## 2020-11-23 NOTE — LETTER
November 23, 2020        Mariza Mckee, NP  [address] [facility]  [address]  [place] [address]             [place] [facility]  [address] [place] [address] [address]  [facility] [address]  Phone: [phone]  Fax: [phone]   Patient: Jean Bates   MR Number: [id number]   YOB: 1960   Date of Visit: 11/23/2020       Dear Dr. Mckee:    Thank you for referring Jean Bates to me for evaluation. Attached you will find relevant portions of my assessment and plan of care.    If you have questions, please do not hesitate to call me. I look forward to following Jean Bates along with you.    Sincerely,      Jean Sy MD            CC  No Recipients    Enclosure

## 2020-11-24 ENCOUNTER — TELEPHONE (OUTPATIENT)
Dept: UROLOGY | Facility: CLINIC | Age: 60
End: 2020-11-24

## 2020-11-24 DIAGNOSIS — R97.20 ELEVATED PSA: Primary | ICD-10-CM

## 2020-11-24 LAB
PROSTATE SPECIFIC ANTIGEN, TOTAL: 2.8 NG/ML (ref 0–4)
PSA FREE MFR SERPL: 22.86 %
PSA FREE SERPL-MCNC: 0.64 NG/ML (ref 0.01–1.5)

## 2020-11-24 NOTE — TELEPHONE ENCOUNTER
Message left informing patient of test results, will place order for methacholine. Also want to make sure that the patient is not having any sort extra thoracic symptoms for SOB as his loop was flat on his PFT   You should follow up with the neurologist in 2 months. If you [place] [facility] [place] to schedule  Otherwise, please let me know if I can put in a referral within [facility]    Stop taking aspirin while you are taking warfarin    If you develop a fever, or new symptoms such as pain with urination, blood in the urine, nausea, vomiting, diarrhea, please be seen in clinic today. Your temperature is elevated today without an obvious source.

## 2020-11-24 NOTE — TELEPHONE ENCOUNTER
----- Message from Terese Chadwick sent at 11/24/2020  8:35 AM CST -----  Regarding: Returning call to office  Contact: pt  Type:  Patient Returning Call    Who Called:  pt  Who Left Message for Patient:  Luz  Does the patient know what this is regarding?:  not sure  Best Call Back Number:  [phone] (home)     Additional Information:  please call back

## 2020-12-09 DIAGNOSIS — F51.01 PRIMARY INSOMNIA: ICD-10-CM

## 2020-12-09 RX ORDER — ZOLPIDEM TARTRATE 5 MG/1
5 TABLET ORAL NIGHTLY
Qty: 90 TABLET | Refills: 1 | Status: SHIPPED | OUTPATIENT
Start: 2020-12-09 | End: 2021-06-08 | Stop reason: SDUPTHER

## 2020-12-09 NOTE — TELEPHONE ENCOUNTER
----- Message from Dara Chadwick sent at 12/9/2020 11:50 AM CST -----  Vm- pt needs refill on zolpidem 90 day   [phone]

## 2020-12-09 NOTE — TELEPHONE ENCOUNTER
Spoke with pt. You wanted to see him again in December. Pt scheduled for F/u appointment 12/15/20. Rx set up for you to sign .

## 2020-12-09 NOTE — TELEPHONE ENCOUNTER
----- Message from Edna Hawthorne sent at 12/9/2020 12:23 PM CST -----  Pt calling for refill on Ambien qty 60 to [facility]   #[phone]

## 2020-12-15 ENCOUNTER — OFFICE VISIT (OUTPATIENT)
Dept: FAMILY MEDICINE | Facility: CLINIC | Age: 60
End: 2020-12-15
Payer: COMMERCIAL

## 2020-12-15 VITALS
HEART RATE: 80 BPM | WEIGHT: 204 LBS | SYSTOLIC BLOOD PRESSURE: 126 MMHG | BODY MASS INDEX: 27.63 KG/M2 | HEIGHT: 72 IN | DIASTOLIC BLOOD PRESSURE: 72 MMHG

## 2020-12-15 DIAGNOSIS — N52.2 DRUG-INDUCED ERECTILE DYSFUNCTION: ICD-10-CM

## 2020-12-15 DIAGNOSIS — R06.09 DOE (DYSPNEA ON EXERTION): ICD-10-CM

## 2020-12-15 DIAGNOSIS — F33.42 RECURRENT MAJOR DEPRESSIVE DISORDER, IN FULL REMISSION: Primary | ICD-10-CM

## 2020-12-15 DIAGNOSIS — F51.01 PRIMARY INSOMNIA: ICD-10-CM

## 2020-12-15 PROCEDURE — 3008F BODY MASS INDEX DOCD: CPT | Mod: S$GLB,,, | Performed by: NURSE PRACTITIONER

## 2020-12-15 PROCEDURE — 3008F PR BODY MASS INDEX (BMI) DOCUMENTED: ICD-10-PCS | Mod: S$GLB,,, | Performed by: NURSE PRACTITIONER

## 2020-12-15 PROCEDURE — 99213 PR OFFICE/OUTPT VISIT, EST, LEVL III, 20-29 MIN: ICD-10-PCS | Mod: S$GLB,,, | Performed by: NURSE PRACTITIONER

## 2020-12-15 PROCEDURE — 99213 OFFICE O/P EST LOW 20 MIN: CPT | Mod: S$GLB,,, | Performed by: NURSE PRACTITIONER

## 2020-12-15 NOTE — PROGRESS NOTES
SUBJECTIVE:    Patient ID: Jean Bates is a 60 y.o. male.    Chief Complaint: Follow-up (no bottles, declines refills, DJ)    Pt presents for follow-up. Doing well since starting Celexa- has noticed a difference in his mood. Only issue is med is causing some ED. Seeing Pulm for SOB workup- all testing negative so far. May consider Cardiology workup if nothing is found pulmonary-wise.      Lab Visit on 11/23/2020   Component Date Value Ref Range Status    PSA Total 11/23/2020 2.8  0.00 - 4.00 ng/mL Final    PSA, Free 11/23/2020 0.64  0.01 - 1.50 ng/mL Final    PSA, Free % 11/23/2020 22.86  Not established % Final   Office Visit on 11/23/2020   Component Date Value Ref Range Status    Color, UA 11/23/2020 Yellow   Final    pH, UA 11/23/2020 6   Final    WBC, UA 11/23/2020 neg   Final    Nitrite, UA 11/23/2020 neg   Final    Protein 11/23/2020 neg   Final    Glucose, UA 11/23/2020 neg   Final    Ketones, UA 11/23/2020 tr   Final    Urobilinogen, UA 11/23/2020 0.2   Final    Bilirubin 11/23/2020 small   Final    Blood, UA 11/23/2020 neg   Final    Clarity, UA 11/23/2020 Clear   Final    Spec Grav UA 11/23/2020 1.025   Final   Office Visit on 06/23/2020   Component Date Value Ref Range Status    EKG 12-Lead 06/23/2020 NSR   Final       Past Medical History:   Diagnosis Date    Depression     GERD (gastroesophageal reflux disease)      Past Surgical History:   Procedure Laterality Date    HERNIA REPAIR       Family History   Problem Relation Age of Onset    Alzheimer's disease Mother     No Known Problems Father        Marital Status:   Alcohol History:  reports current alcohol use.  Tobacco History:  reports that he has never smoked. He has never used smokeless tobacco.  Drug History:  reports no history of drug use.    Review of patient's allergies indicates:  No Known Allergies    Current Outpatient Medications:     buPROPion (WELLBUTRIN XL) 150 MG TB24 tablet, Take 1 tablet (150 mg  total) by mouth once daily., Disp: 90 tablet, Rfl: 1    citalopram (CELEXA) 20 MG tablet, Take 1 tablet (20 mg total) by mouth once daily., Disp: 90 tablet, Rfl: 1    omeprazole (PRILOSEC OTC) 20 MG tablet, Take 20 mg by mouth daily as needed., Disp: , Rfl:     zolpidem (AMBIEN) 5 MG Tab, Take 1 tablet (5 mg total) by mouth every evening., Disp: 90 tablet, Rfl: 1    Review of Systems   Constitutional: Negative for activity change and appetite change.   HENT: Negative.    Respiratory: Positive for shortness of breath (with certain activity). Negative for chest tightness and wheezing.    Cardiovascular: Negative for chest pain and palpitations.   Neurological: Negative.    Psychiatric/Behavioral: Positive for dysphoric mood (improved) and sleep disturbance (takes ambien nightly.).          Objective:      Vitals:    12/15/20 1600   BP: 126/72   Pulse: 80   Weight: 92.5 kg (204 lb)   Height: 6' (1.829 m)     Body mass index is 27.67 kg/m².  Physical Exam  Constitutional:       Appearance: Normal appearance.   HENT:      Head: Normocephalic and atraumatic.   Eyes:      Pupils: Pupils are equal, round, and reactive to light.   Pulmonary:      Effort: No respiratory distress.   Musculoskeletal: Normal range of motion.   Skin:     General: Skin is warm and dry.      Capillary Refill: Capillary refill takes less than 2 seconds.   Neurological:      Mental Status: He is alert and oriented to person, place, and time.   Psychiatric:         Mood and Affect: Mood normal.           Assessment:       1. Recurrent major depressive disorder, in full remission    2. Primary insomnia    3. HARRINGTON (dyspnea on exertion)    4. Drug-induced erectile dysfunction         Plan:       Recurrent major depressive disorder, in full remission  - Mood improved    Primary insomnia    HARRINGTON (dyspnea on exertion)    Drug-induced erectile dysfunction  - Declines medication management at this time. Will consider if becomes more of an issue.     Follow  up in about 6 months (around 6/15/2021) for med check.

## 2021-01-11 ENCOUNTER — HOSPITAL ENCOUNTER (OUTPATIENT)
Dept: PULMONOLOGY | Facility: HOSPITAL | Age: 61
Discharge: HOME | End: 2021-01-11
Attending: NURSE PRACTITIONER
Payer: COMMERCIAL

## 2021-01-11 ENCOUNTER — TELEPHONE (OUTPATIENT)
Dept: PULMONOLOGY | Facility: CLINIC | Age: 61
End: 2021-01-11

## 2021-01-11 VITALS — HEART RATE: 92 BPM | RESPIRATION RATE: 18 BRPM | OXYGEN SATURATION: 98 %

## 2021-01-11 DIAGNOSIS — R06.00 DYSPNEA, UNSPECIFIED TYPE: ICD-10-CM

## 2021-01-11 PROCEDURE — 94070 EVALUATION OF WHEEZING: CPT

## 2021-01-11 PROCEDURE — 94060 EVALUATION OF WHEEZING: CPT | Mod: 59

## 2021-01-11 PROCEDURE — 63600175 PHARM REV CODE 636 W HCPCS: Performed by: NURSE PRACTITIONER

## 2021-01-11 RX ADMIN — METHACHOLINE CHLORIDE 25 MG: 100 POWDER, FOR SOLUTION RESPIRATORY (INHALATION) at 09:01

## 2021-01-14 ENCOUNTER — OFFICE VISIT (OUTPATIENT)
Dept: PULMONOLOGY | Facility: CLINIC | Age: 61
End: 2021-01-14
Payer: COMMERCIAL

## 2021-01-14 VITALS
SYSTOLIC BLOOD PRESSURE: 122 MMHG | WEIGHT: 203.63 LBS | HEART RATE: 81 BPM | HEIGHT: 72 IN | DIASTOLIC BLOOD PRESSURE: 72 MMHG | BODY MASS INDEX: 27.58 KG/M2 | OXYGEN SATURATION: 98 %

## 2021-01-14 DIAGNOSIS — R06.00 DYSPNEA, UNSPECIFIED TYPE: Primary | ICD-10-CM

## 2021-01-14 PROCEDURE — 99213 OFFICE O/P EST LOW 20 MIN: CPT | Mod: S$GLB,,, | Performed by: NURSE PRACTITIONER

## 2021-01-14 PROCEDURE — 1126F AMNT PAIN NOTED NONE PRSNT: CPT | Mod: S$GLB,,, | Performed by: NURSE PRACTITIONER

## 2021-01-14 PROCEDURE — 1126F PR PAIN SEVERITY QUANTIFIED, NO PAIN PRESENT: ICD-10-PCS | Mod: S$GLB,,, | Performed by: NURSE PRACTITIONER

## 2021-01-14 PROCEDURE — 3008F PR BODY MASS INDEX (BMI) DOCUMENTED: ICD-10-PCS | Mod: S$GLB,,, | Performed by: NURSE PRACTITIONER

## 2021-01-14 PROCEDURE — 99213 PR OFFICE/OUTPT VISIT, EST, LEVL III, 20-29 MIN: ICD-10-PCS | Mod: S$GLB,,, | Performed by: NURSE PRACTITIONER

## 2021-01-14 PROCEDURE — 3008F BODY MASS INDEX DOCD: CPT | Mod: S$GLB,,, | Performed by: NURSE PRACTITIONER

## 2021-01-20 ENCOUNTER — OFFICE VISIT (OUTPATIENT)
Dept: CARDIOLOGY | Facility: CLINIC | Age: 61
End: 2021-01-20
Payer: COMMERCIAL

## 2021-01-20 VITALS
SYSTOLIC BLOOD PRESSURE: 118 MMHG | OXYGEN SATURATION: 97 % | WEIGHT: 203 LBS | DIASTOLIC BLOOD PRESSURE: 68 MMHG | HEIGHT: 72 IN | RESPIRATION RATE: 16 BRPM | HEART RATE: 77 BPM | BODY MASS INDEX: 27.5 KG/M2

## 2021-01-20 DIAGNOSIS — R06.00 DYSPNEA, UNSPECIFIED TYPE: ICD-10-CM

## 2021-01-20 DIAGNOSIS — R06.02 SOB (SHORTNESS OF BREATH): ICD-10-CM

## 2021-01-20 PROCEDURE — 3008F PR BODY MASS INDEX (BMI) DOCUMENTED: ICD-10-PCS | Mod: CPTII,S$GLB,, | Performed by: INTERNAL MEDICINE

## 2021-01-20 PROCEDURE — 99204 PR OFFICE/OUTPT VISIT, NEW, LEVL IV, 45-59 MIN: ICD-10-PCS | Mod: S$GLB,,, | Performed by: INTERNAL MEDICINE

## 2021-01-20 PROCEDURE — 93000 EKG 12-LEAD: ICD-10-PCS | Mod: S$GLB,,, | Performed by: INTERNAL MEDICINE

## 2021-01-20 PROCEDURE — 1126F AMNT PAIN NOTED NONE PRSNT: CPT | Mod: S$GLB,,, | Performed by: INTERNAL MEDICINE

## 2021-01-20 PROCEDURE — 99204 OFFICE O/P NEW MOD 45 MIN: CPT | Mod: S$GLB,,, | Performed by: INTERNAL MEDICINE

## 2021-01-20 PROCEDURE — 1126F PR PAIN SEVERITY QUANTIFIED, NO PAIN PRESENT: ICD-10-PCS | Mod: S$GLB,,, | Performed by: INTERNAL MEDICINE

## 2021-01-20 PROCEDURE — 3008F BODY MASS INDEX DOCD: CPT | Mod: CPTII,S$GLB,, | Performed by: INTERNAL MEDICINE

## 2021-01-20 PROCEDURE — 93000 ELECTROCARDIOGRAM COMPLETE: CPT | Mod: S$GLB,,, | Performed by: INTERNAL MEDICINE

## 2021-02-10 ENCOUNTER — HOSPITAL ENCOUNTER (OUTPATIENT)
Dept: RADIOLOGY | Facility: CLINIC | Age: 61
Discharge: HOME | End: 2021-02-10
Attending: NURSE PRACTITIONER
Payer: COMMERCIAL

## 2021-02-10 ENCOUNTER — HOSPITAL ENCOUNTER (OUTPATIENT)
Dept: CARDIOLOGY | Facility: CLINIC | Age: 61
Discharge: HOME | End: 2021-02-10
Attending: NURSE PRACTITIONER
Payer: COMMERCIAL

## 2021-02-10 VITALS — HEIGHT: 73 IN | WEIGHT: 200 LBS | BODY MASS INDEX: 26.51 KG/M2

## 2021-02-10 DIAGNOSIS — R06.00 DYSPNEA, UNSPECIFIED TYPE: ICD-10-CM

## 2021-02-10 PROCEDURE — 93308 TTE F-UP OR LMTD: CPT | Mod: S$GLB,,, | Performed by: INTERNAL MEDICINE

## 2021-02-10 PROCEDURE — 78452 HT MUSCLE IMAGE SPECT MULT: CPT | Mod: S$GLB,,, | Performed by: INTERNAL MEDICINE

## 2021-02-10 PROCEDURE — 93321 PR DOPPLER ECHO HEART,LIMITED,F/U: ICD-10-PCS | Mod: S$GLB,,, | Performed by: INTERNAL MEDICINE

## 2021-02-10 PROCEDURE — 93325 DOPPLER ECHO COLOR FLOW MAPG: CPT | Mod: S$GLB,,, | Performed by: INTERNAL MEDICINE

## 2021-02-10 PROCEDURE — 93308 ECHO (CUPID ONLY): ICD-10-PCS | Mod: S$GLB,,, | Performed by: INTERNAL MEDICINE

## 2021-02-10 PROCEDURE — 78452 STRESS TEST WITH MYOCARDIAL PERFUSION (CUPID ONLY): ICD-10-PCS | Mod: S$GLB,,, | Performed by: INTERNAL MEDICINE

## 2021-02-10 PROCEDURE — A9502 STRESS TEST WITH MYOCARDIAL PERFUSION (CUPID ONLY): ICD-10-PCS | Mod: S$GLB,,, | Performed by: INTERNAL MEDICINE

## 2021-02-10 PROCEDURE — 93015 STRESS TEST WITH MYOCARDIAL PERFUSION (CUPID ONLY): ICD-10-PCS | Mod: S$GLB,,, | Performed by: INTERNAL MEDICINE

## 2021-02-10 PROCEDURE — A9502 TC99M TETROFOSMIN: HCPCS | Mod: S$GLB,,, | Performed by: INTERNAL MEDICINE

## 2021-02-10 PROCEDURE — 93325 PR DOPPLER COLOR FLOW VELOCITY MAP: ICD-10-PCS | Mod: S$GLB,,, | Performed by: INTERNAL MEDICINE

## 2021-02-10 PROCEDURE — 93321 DOPPLER ECHO F-UP/LMTD STD: CPT | Mod: S$GLB,,, | Performed by: INTERNAL MEDICINE

## 2021-02-10 PROCEDURE — 93015 CV STRESS TEST SUPVJ I&R: CPT | Mod: S$GLB,,, | Performed by: INTERNAL MEDICINE

## 2021-02-11 LAB
EJECTION FRACTION- HIGH: 65 %
END DIASTOLIC INDEX-HIGH: 158 ML/M2
END DIASTOLIC INDEX-LOW: 94 ML/M2
END SYSTOLIC INDEX-HIGH: 71 ML/M2
END SYSTOLIC INDEX-LOW: 33 ML/M2
NUC STRESS DIASTOLIC VOLUME INDEX: 78
NUC STRESS EJECTION FRACTION: 79 %
NUC STRESS SYSTOLIC VOLUME INDEX: 17
OHS CV CPX 1 MINUTE RECOVERY HEART RATE: 127 BPM
OHS CV CPX 85 PERCENT MAX PREDICTED HEART RATE MALE: 137
OHS CV CPX ESTIMATED METS: 8
OHS CV CPX MAX PREDICTED HEART RATE: 161
OHS CV CPX PATIENT IS FEMALE: 0
OHS CV CPX PATIENT IS MALE: 1
OHS CV CPX PEAK DIASTOLIC BLOOD PRESSURE: 68 MMHG
OHS CV CPX PEAK HEAR RATE: 144 BPM
OHS CV CPX PEAK RATE PRESSURE PRODUCT: NORMAL
OHS CV CPX PEAK SYSTOLIC BLOOD PRESSURE: 180 MMHG
OHS CV CPX PERCENT MAX PREDICTED HEART RATE ACHIEVED: 89
RETIRED EF AND QEF - SEE NOTES: 53 %
STRESS ECHO POST EXERCISE DUR MIN: 6 MINUTES
STRESS ECHO POST EXERCISE DUR SEC: 35 SECONDS
STRESS ST DEPRESSION: 0.5 MM

## 2021-02-12 LAB
AORTIC ROOT ANNULUS: 3.8 CM
AORTIC VALVE CUSP SEPERATION: 2.6 CM
AV INDEX (PROSTH): 1.17
AV MEAN GRADIENT: 2 MMHG
AV PEAK GRADIENT: 4 MMHG
AV VALVE AREA: 5.75 CM2
AV VELOCITY RATIO: 0.88
BSA FOR ECHO PROCEDURE: 2.16 M2
CV ECHO LV RWT: 0.44 CM
DOP CALC AO PEAK VEL: 0.98 M/S
DOP CALC AO VTI: 19.2 CM
DOP CALC LVOT AREA: 4.9 CM2
DOP CALC LVOT DIAMETER: 2.5 CM
DOP CALC LVOT PEAK VEL: 0.86 M/S
DOP CALC LVOT STROKE VOLUME: 110.39 CM3
DOP CALCLVOT PEAK VEL VTI: 22.5 CM
E WAVE DECELERATION TIME: 179 MS
E/A RATIO: 1.29
E/E' RATIO: 10.21 M/S
ECHO LV POSTERIOR WALL: 0.95 CM (ref 0.6–1.1)
FRACTIONAL SHORTENING: 34 % (ref 28–44)
INTERVENTRICULAR SEPTUM: 0.87 CM (ref 0.6–1.1)
IVRT: 95 MS
LEFT ATRIUM SIZE: 3.2 CM
LEFT INTERNAL DIMENSION IN SYSTOLE: 2.82 CM (ref 2.1–4)
LEFT VENTRICLE MASS INDEX: 58 G/M2
LEFT VENTRICULAR INTERNAL DIMENSION IN DIASTOLE: 4.27 CM (ref 3.5–6)
LEFT VENTRICULAR MASS: 123.75 G
LV LATERAL E/E' RATIO: 9.7 M/S
LV SEPTAL E/E' RATIO: 10.78 M/S
MV PEAK A VEL: 0.75 M/S
MV PEAK E VEL: 0.97 M/S
PISA TR MAX VEL: 2.4 M/S
RA PRESSURE: 3 MMHG
RIGHT VENTRICULAR END-DIASTOLIC DIMENSION: 2.14 CM
TDI LATERAL: 0.1 M/S
TDI SEPTAL: 0.09 M/S
TDI: 0.1 M/S
TR MAX PG: 23 MMHG
TV REST PULMONARY ARTERY PRESSURE: 26 MMHG

## 2021-02-24 ENCOUNTER — OFFICE VISIT (OUTPATIENT)
Dept: CARDIOLOGY | Facility: CLINIC | Age: 61
End: 2021-02-24
Payer: COMMERCIAL

## 2021-02-24 VITALS
BODY MASS INDEX: 27.17 KG/M2 | RESPIRATION RATE: 16 BRPM | SYSTOLIC BLOOD PRESSURE: 120 MMHG | OXYGEN SATURATION: 97 % | DIASTOLIC BLOOD PRESSURE: 70 MMHG | HEART RATE: 82 BPM | HEIGHT: 73 IN | WEIGHT: 205 LBS

## 2021-02-24 DIAGNOSIS — R06.09 DYSPNEA ON EXERTION: Primary | ICD-10-CM

## 2021-02-24 PROCEDURE — 1126F PR PAIN SEVERITY QUANTIFIED, NO PAIN PRESENT: ICD-10-PCS | Mod: S$GLB,,, | Performed by: INTERNAL MEDICINE

## 2021-02-24 PROCEDURE — 99214 PR OFFICE/OUTPT VISIT, EST, LEVL IV, 30-39 MIN: ICD-10-PCS | Mod: S$GLB,,, | Performed by: INTERNAL MEDICINE

## 2021-02-24 PROCEDURE — 99214 OFFICE O/P EST MOD 30 MIN: CPT | Mod: S$GLB,,, | Performed by: INTERNAL MEDICINE

## 2021-02-24 PROCEDURE — 1126F AMNT PAIN NOTED NONE PRSNT: CPT | Mod: S$GLB,,, | Performed by: INTERNAL MEDICINE

## 2021-02-24 PROCEDURE — 3008F BODY MASS INDEX DOCD: CPT | Mod: CPTII,S$GLB,, | Performed by: INTERNAL MEDICINE

## 2021-02-24 PROCEDURE — 3008F PR BODY MASS INDEX (BMI) DOCUMENTED: ICD-10-PCS | Mod: CPTII,S$GLB,, | Performed by: INTERNAL MEDICINE

## 2021-02-25 ENCOUNTER — TELEPHONE (OUTPATIENT)
Dept: CARDIOLOGY | Facility: CLINIC | Age: 61
End: 2021-02-25

## 2021-02-26 DIAGNOSIS — K44.9 HIATAL HERNIA: Primary | ICD-10-CM

## 2021-02-26 DIAGNOSIS — R06.09 DYSPNEA ON EXERTION: ICD-10-CM

## 2021-03-02 ENCOUNTER — TELEPHONE (OUTPATIENT)
Dept: FAMILY MEDICINE | Facility: CLINIC | Age: 61
End: 2021-03-02

## 2021-03-02 ENCOUNTER — PATIENT MESSAGE (OUTPATIENT)
Dept: FAMILY MEDICINE | Facility: CLINIC | Age: 61
End: 2021-03-02

## 2021-03-03 ENCOUNTER — PATIENT MESSAGE (OUTPATIENT)
Dept: FAMILY MEDICINE | Facility: CLINIC | Age: 61
End: 2021-03-03

## 2021-03-04 ENCOUNTER — OFFICE VISIT (OUTPATIENT)
Dept: GASTROENTEROLOGY | Facility: CLINIC | Age: 61
End: 2021-03-04
Payer: COMMERCIAL

## 2021-03-04 ENCOUNTER — PATIENT MESSAGE (OUTPATIENT)
Dept: FAMILY MEDICINE | Facility: CLINIC | Age: 61
End: 2021-03-04

## 2021-03-04 VITALS — HEIGHT: 72 IN | BODY MASS INDEX: 27 KG/M2 | WEIGHT: 199.31 LBS

## 2021-03-04 DIAGNOSIS — N52.2 DRUG-INDUCED ERECTILE DYSFUNCTION: Primary | ICD-10-CM

## 2021-03-04 DIAGNOSIS — K44.9 HIATAL HERNIA: Primary | ICD-10-CM

## 2021-03-04 DIAGNOSIS — R06.09 DYSPNEA ON EXERTION: ICD-10-CM

## 2021-03-04 DIAGNOSIS — K21.9 GASTROESOPHAGEAL REFLUX DISEASE, UNSPECIFIED WHETHER ESOPHAGITIS PRESENT: ICD-10-CM

## 2021-03-04 DIAGNOSIS — Z12.11 SCREENING FOR COLON CANCER: ICD-10-CM

## 2021-03-04 PROCEDURE — 99204 PR OFFICE/OUTPT VISIT, NEW, LEVL IV, 45-59 MIN: ICD-10-PCS | Mod: S$GLB,,, | Performed by: NURSE PRACTITIONER

## 2021-03-04 PROCEDURE — 3008F BODY MASS INDEX DOCD: CPT | Mod: CPTII,S$GLB,, | Performed by: NURSE PRACTITIONER

## 2021-03-04 PROCEDURE — 99204 OFFICE O/P NEW MOD 45 MIN: CPT | Mod: S$GLB,,, | Performed by: NURSE PRACTITIONER

## 2021-03-04 PROCEDURE — 99999 PR PBB SHADOW E&M-EST. PATIENT-LVL IV: CPT | Mod: PBBFAC,,, | Performed by: NURSE PRACTITIONER

## 2021-03-04 PROCEDURE — 1126F AMNT PAIN NOTED NONE PRSNT: CPT | Mod: S$GLB,,, | Performed by: NURSE PRACTITIONER

## 2021-03-04 PROCEDURE — 1126F PR PAIN SEVERITY QUANTIFIED, NO PAIN PRESENT: ICD-10-PCS | Mod: S$GLB,,, | Performed by: NURSE PRACTITIONER

## 2021-03-04 PROCEDURE — 99999 PR PBB SHADOW E&M-EST. PATIENT-LVL IV: ICD-10-PCS | Mod: PBBFAC,,, | Performed by: NURSE PRACTITIONER

## 2021-03-04 PROCEDURE — 3008F PR BODY MASS INDEX (BMI) DOCUMENTED: ICD-10-PCS | Mod: CPTII,S$GLB,, | Performed by: NURSE PRACTITIONER

## 2021-03-04 RX ORDER — SILDENAFIL 50 MG/1
50 TABLET, FILM COATED ORAL DAILY PRN
Qty: 15 TABLET | Refills: 1 | Status: SHIPPED | OUTPATIENT
Start: 2021-03-04 | End: 2024-02-22

## 2021-03-11 ENCOUNTER — HOSPITAL ENCOUNTER (OUTPATIENT)
Dept: RADIOLOGY | Facility: HOSPITAL | Age: 61
Discharge: HOME | End: 2021-03-11
Attending: NURSE PRACTITIONER
Payer: COMMERCIAL

## 2021-03-11 ENCOUNTER — TELEPHONE (OUTPATIENT)
Dept: GASTROENTEROLOGY | Facility: CLINIC | Age: 61
End: 2021-03-11

## 2021-03-11 ENCOUNTER — PATIENT MESSAGE (OUTPATIENT)
Dept: GASTROENTEROLOGY | Facility: CLINIC | Age: 61
End: 2021-03-11

## 2021-03-11 DIAGNOSIS — K44.9 HIATAL HERNIA: ICD-10-CM

## 2021-03-11 DIAGNOSIS — R06.09 DYSPNEA ON EXERTION: ICD-10-CM

## 2021-03-11 PROCEDURE — 74177 CT ABD & PELVIS W/CONTRAST: CPT | Mod: TC

## 2021-03-11 PROCEDURE — 25500020 PHARM REV CODE 255

## 2021-03-11 PROCEDURE — A9698 NON-RAD CONTRAST MATERIALNOC: HCPCS

## 2021-03-11 PROCEDURE — 74177 CT ABD & PELVIS W/CONTRAST: CPT | Mod: 26,,, | Performed by: RADIOLOGY

## 2021-03-11 PROCEDURE — 74177 CT ABDOMEN PELVIS WITH CONTRAST: ICD-10-PCS | Mod: 26,,, | Performed by: RADIOLOGY

## 2021-03-11 RX ADMIN — IOHEXOL 100 ML: 350 INJECTION, SOLUTION INTRAVENOUS at 11:03

## 2021-03-11 RX ADMIN — IOHEXOL 1000 ML: 12 SOLUTION ORAL at 11:03

## 2021-05-11 DIAGNOSIS — F33.42 RECURRENT MAJOR DEPRESSIVE DISORDER, IN FULL REMISSION: ICD-10-CM

## 2021-05-11 RX ORDER — BUPROPION HYDROCHLORIDE 150 MG/1
150 TABLET ORAL DAILY
Qty: 90 TABLET | Refills: 0 | Status: SHIPPED | OUTPATIENT
Start: 2021-05-11 | End: 2021-08-11 | Stop reason: SDUPTHER

## 2021-05-11 RX ORDER — CITALOPRAM 20 MG/1
20 TABLET, FILM COATED ORAL DAILY
Qty: 90 TABLET | Refills: 0 | Status: SHIPPED | OUTPATIENT
Start: 2021-05-11 | End: 2021-08-11 | Stop reason: SDUPTHER

## 2021-05-25 ENCOUNTER — LAB VISIT (OUTPATIENT)
Dept: LAB | Facility: HOSPITAL | Age: 61
End: 2021-05-25
Attending: UROLOGY
Payer: COMMERCIAL

## 2021-05-25 DIAGNOSIS — R97.20 ELEVATED PSA: ICD-10-CM

## 2021-05-25 LAB
PROSTATE SPECIFIC ANTIGEN, TOTAL: 3.6 NG/ML (ref 0–4)
PSA FREE MFR SERPL: 13.06 %
PSA FREE SERPL-MCNC: 0.47 NG/ML (ref 0.01–1.5)

## 2021-05-25 PROCEDURE — 84154 ASSAY OF PSA FREE: CPT | Performed by: UROLOGY

## 2021-05-25 PROCEDURE — 36415 COLL VENOUS BLD VENIPUNCTURE: CPT | Performed by: UROLOGY

## 2021-05-25 PROCEDURE — 84153 ASSAY OF PSA TOTAL: CPT | Performed by: UROLOGY

## 2021-05-30 DIAGNOSIS — R97.20 ELEVATED PSA: Primary | ICD-10-CM

## 2021-05-31 ENCOUNTER — TELEPHONE (OUTPATIENT)
Dept: UROLOGY | Facility: CLINIC | Age: 61
End: 2021-05-31

## 2021-06-08 DIAGNOSIS — F51.01 PRIMARY INSOMNIA: ICD-10-CM

## 2021-06-08 RX ORDER — ZOLPIDEM TARTRATE 5 MG/1
5 TABLET ORAL NIGHTLY
Qty: 90 TABLET | Refills: 1 | Status: SHIPPED | OUTPATIENT
Start: 2021-06-08 | End: 2021-11-30 | Stop reason: SDUPTHER

## 2021-09-12 ENCOUNTER — PATIENT MESSAGE (OUTPATIENT)
Dept: FAMILY MEDICINE | Facility: CLINIC | Age: 61
End: 2021-09-12

## 2021-09-12 DIAGNOSIS — F33.42 RECURRENT MAJOR DEPRESSIVE DISORDER, IN FULL REMISSION: ICD-10-CM

## 2021-09-12 RX ORDER — BUPROPION HYDROCHLORIDE 150 MG/1
150 TABLET ORAL DAILY
Qty: 30 TABLET | Refills: 0 | Status: CANCELLED | OUTPATIENT
Start: 2021-09-12

## 2021-09-13 ENCOUNTER — PATIENT MESSAGE (OUTPATIENT)
Dept: FAMILY MEDICINE | Facility: CLINIC | Age: 61
End: 2021-09-13

## 2021-09-13 DIAGNOSIS — F33.42 RECURRENT MAJOR DEPRESSIVE DISORDER, IN FULL REMISSION: ICD-10-CM

## 2021-09-13 RX ORDER — BUPROPION HYDROCHLORIDE 150 MG/1
150 TABLET ORAL DAILY
Qty: 90 TABLET | Refills: 0 | Status: SHIPPED | OUTPATIENT
Start: 2021-09-13 | End: 2021-12-20 | Stop reason: SDUPTHER

## 2021-09-21 ENCOUNTER — TELEPHONE (OUTPATIENT)
Dept: FAMILY MEDICINE | Facility: CLINIC | Age: 61
End: 2021-09-21

## 2021-09-22 ENCOUNTER — OFFICE VISIT (OUTPATIENT)
Dept: FAMILY MEDICINE | Facility: CLINIC | Age: 61
End: 2021-09-22
Payer: COMMERCIAL

## 2021-09-22 VITALS
BODY MASS INDEX: 26.95 KG/M2 | WEIGHT: 199 LBS | TEMPERATURE: 99 F | HEART RATE: 76 BPM | RESPIRATION RATE: 98 BRPM | SYSTOLIC BLOOD PRESSURE: 110 MMHG | HEIGHT: 72 IN | DIASTOLIC BLOOD PRESSURE: 60 MMHG

## 2021-09-22 DIAGNOSIS — U07.1 COVID-19: ICD-10-CM

## 2021-09-22 DIAGNOSIS — J40 BRONCHITIS: Primary | ICD-10-CM

## 2021-09-22 PROCEDURE — 3008F PR BODY MASS INDEX (BMI) DOCUMENTED: ICD-10-PCS | Mod: S$GLB,,, | Performed by: NURSE PRACTITIONER

## 2021-09-22 PROCEDURE — 96372 THER/PROPH/DIAG INJ SC/IM: CPT | Mod: S$GLB,,, | Performed by: NURSE PRACTITIONER

## 2021-09-22 PROCEDURE — 99213 OFFICE O/P EST LOW 20 MIN: CPT | Mod: 25,S$GLB,, | Performed by: NURSE PRACTITIONER

## 2021-09-22 PROCEDURE — 1160F PR REVIEW ALL MEDS BY PRESCRIBER/CLIN PHARMACIST DOCUMENTED: ICD-10-PCS | Mod: S$GLB,,, | Performed by: NURSE PRACTITIONER

## 2021-09-22 PROCEDURE — 99213 PR OFFICE/OUTPT VISIT, EST, LEVL III, 20-29 MIN: ICD-10-PCS | Mod: 25,S$GLB,, | Performed by: NURSE PRACTITIONER

## 2021-09-22 PROCEDURE — 3008F BODY MASS INDEX DOCD: CPT | Mod: S$GLB,,, | Performed by: NURSE PRACTITIONER

## 2021-09-22 PROCEDURE — 96372 PR INJECTION,THERAP/PROPH/DIAG2ST, IM OR SUBCUT: ICD-10-PCS | Mod: S$GLB,,, | Performed by: NURSE PRACTITIONER

## 2021-09-22 PROCEDURE — 1160F RVW MEDS BY RX/DR IN RCRD: CPT | Mod: S$GLB,,, | Performed by: NURSE PRACTITIONER

## 2021-09-22 RX ORDER — DEXAMETHASONE SODIUM PHOSPHATE 4 MG/ML
4 INJECTION, SOLUTION INTRA-ARTICULAR; INTRALESIONAL; INTRAMUSCULAR; INTRAVENOUS; SOFT TISSUE ONCE
Status: COMPLETED | OUTPATIENT
Start: 2021-09-22 | End: 2021-09-22

## 2021-09-22 RX ORDER — AZITHROMYCIN 250 MG/1
TABLET, FILM COATED ORAL
Qty: 6 TABLET | Refills: 0 | Status: SHIPPED | OUTPATIENT
Start: 2021-09-22 | End: 2021-10-27

## 2021-09-22 RX ADMIN — DEXAMETHASONE SODIUM PHOSPHATE 4 MG: 4 INJECTION, SOLUTION INTRA-ARTICULAR; INTRALESIONAL; INTRAMUSCULAR; INTRAVENOUS; SOFT TISSUE at 02:09

## 2021-10-04 DIAGNOSIS — F33.42 RECURRENT MAJOR DEPRESSIVE DISORDER, IN FULL REMISSION: ICD-10-CM

## 2021-10-04 RX ORDER — CITALOPRAM 20 MG/1
20 TABLET, FILM COATED ORAL DAILY
Qty: 90 TABLET | Refills: 1 | Status: SHIPPED | OUTPATIENT
Start: 2021-10-04 | End: 2022-05-10 | Stop reason: SDUPTHER

## 2021-10-27 ENCOUNTER — OFFICE VISIT (OUTPATIENT)
Dept: FAMILY MEDICINE | Facility: CLINIC | Age: 61
End: 2021-10-27
Payer: COMMERCIAL

## 2021-10-27 VITALS
BODY MASS INDEX: 27.5 KG/M2 | HEIGHT: 72 IN | HEART RATE: 76 BPM | WEIGHT: 203 LBS | SYSTOLIC BLOOD PRESSURE: 118 MMHG | DIASTOLIC BLOOD PRESSURE: 72 MMHG

## 2021-10-27 DIAGNOSIS — F51.01 PRIMARY INSOMNIA: ICD-10-CM

## 2021-10-27 DIAGNOSIS — Z12.5 SCREENING FOR PROSTATE CANCER: ICD-10-CM

## 2021-10-27 DIAGNOSIS — F33.42 RECURRENT MAJOR DEPRESSIVE DISORDER, IN FULL REMISSION: ICD-10-CM

## 2021-10-27 DIAGNOSIS — K21.9 GASTROESOPHAGEAL REFLUX DISEASE, UNSPECIFIED WHETHER ESOPHAGITIS PRESENT: Primary | ICD-10-CM

## 2021-10-27 DIAGNOSIS — Z79.899 HIGH RISK MEDICATION USE: ICD-10-CM

## 2021-10-27 DIAGNOSIS — N52.2 DRUG-INDUCED ERECTILE DYSFUNCTION: ICD-10-CM

## 2021-10-27 PROCEDURE — 3078F PR MOST RECENT DIASTOLIC BLOOD PRESSURE < 80 MM HG: ICD-10-PCS | Mod: S$GLB,,, | Performed by: NURSE PRACTITIONER

## 2021-10-27 PROCEDURE — 99214 OFFICE O/P EST MOD 30 MIN: CPT | Mod: S$GLB,,, | Performed by: NURSE PRACTITIONER

## 2021-10-27 PROCEDURE — 3008F BODY MASS INDEX DOCD: CPT | Mod: S$GLB,,, | Performed by: NURSE PRACTITIONER

## 2021-10-27 PROCEDURE — 3074F PR MOST RECENT SYSTOLIC BLOOD PRESSURE < 130 MM HG: ICD-10-PCS | Mod: S$GLB,,, | Performed by: NURSE PRACTITIONER

## 2021-10-27 PROCEDURE — 99214 PR OFFICE/OUTPT VISIT, EST, LEVL IV, 30-39 MIN: ICD-10-PCS | Mod: S$GLB,,, | Performed by: NURSE PRACTITIONER

## 2021-10-27 PROCEDURE — 3078F DIAST BP <80 MM HG: CPT | Mod: S$GLB,,, | Performed by: NURSE PRACTITIONER

## 2021-10-27 PROCEDURE — 1160F RVW MEDS BY RX/DR IN RCRD: CPT | Mod: S$GLB,,, | Performed by: NURSE PRACTITIONER

## 2021-10-27 PROCEDURE — 3074F SYST BP LT 130 MM HG: CPT | Mod: S$GLB,,, | Performed by: NURSE PRACTITIONER

## 2021-10-27 PROCEDURE — 1160F PR REVIEW ALL MEDS BY PRESCRIBER/CLIN PHARMACIST DOCUMENTED: ICD-10-PCS | Mod: S$GLB,,, | Performed by: NURSE PRACTITIONER

## 2021-10-27 PROCEDURE — 3008F PR BODY MASS INDEX (BMI) DOCUMENTED: ICD-10-PCS | Mod: S$GLB,,, | Performed by: NURSE PRACTITIONER

## 2021-10-27 RX ORDER — OMEPRAZOLE 20 MG/1
20 CAPSULE, DELAYED RELEASE ORAL DAILY
Qty: 180 CAPSULE | Refills: 1 | Status: SHIPPED | OUTPATIENT
Start: 2021-10-27 | End: 2022-10-31 | Stop reason: SDUPTHER

## 2021-10-29 LAB
ALBUMIN SERPL-MCNC: 4 G/DL (ref 3.6–5.1)
ALBUMIN/CREAT UR: 2 MCG/MG CREAT
ALBUMIN/GLOB SERPL: 1.7 (CALC) (ref 1–2.5)
ALP SERPL-CCNC: 62 U/L (ref 35–144)
ALT SERPL-CCNC: 25 U/L (ref 9–46)
APPEARANCE UR: CLEAR
AST SERPL-CCNC: 23 U/L (ref 10–35)
BACTERIA #/AREA URNS HPF: NORMAL /HPF
BACTERIA UR CULT: NORMAL
BASOPHILS # BLD AUTO: 72 CELLS/UL (ref 0–200)
BASOPHILS NFR BLD AUTO: 1 %
BILIRUB SERPL-MCNC: 0.5 MG/DL (ref 0.2–1.2)
BILIRUB UR QL STRIP: NEGATIVE
BUN SERPL-MCNC: 12 MG/DL (ref 7–25)
BUN/CREAT SERPL: NORMAL (CALC) (ref 6–22)
CALCIUM SERPL-MCNC: 9.2 MG/DL (ref 8.6–10.3)
CHLORIDE SERPL-SCNC: 105 MMOL/L (ref 98–110)
CHOLEST SERPL-MCNC: 188 MG/DL
CHOLEST/HDLC SERPL: 3.8 (CALC)
CO2 SERPL-SCNC: 27 MMOL/L (ref 20–32)
COLOR UR: YELLOW
CREAT SERPL-MCNC: 0.96 MG/DL (ref 0.7–1.25)
CREAT UR-MCNC: 147 MG/DL (ref 20–320)
EOSINOPHIL # BLD AUTO: 209 CELLS/UL (ref 15–500)
EOSINOPHIL NFR BLD AUTO: 2.9 %
ERYTHROCYTE [DISTWIDTH] IN BLOOD BY AUTOMATED COUNT: 14.7 % (ref 11–15)
GLOBULIN SER CALC-MCNC: 2.4 G/DL (CALC) (ref 1.9–3.7)
GLUCOSE SERPL-MCNC: 87 MG/DL (ref 65–99)
GLUCOSE UR QL STRIP: NEGATIVE
HCT VFR BLD AUTO: 38 % (ref 38.5–50)
HDLC SERPL-MCNC: 49 MG/DL
HGB BLD-MCNC: 11.8 G/DL (ref 13.2–17.1)
HGB UR QL STRIP: NEGATIVE
HYALINE CASTS #/AREA URNS LPF: NORMAL /LPF
KETONES UR QL STRIP: NEGATIVE
LDLC SERPL CALC-MCNC: 107 MG/DL (CALC)
LEUKOCYTE ESTERASE UR QL STRIP: NEGATIVE
LYMPHOCYTES # BLD AUTO: 2268 CELLS/UL (ref 850–3900)
LYMPHOCYTES NFR BLD AUTO: 31.5 %
MCH RBC QN AUTO: 24.2 PG (ref 27–33)
MCHC RBC AUTO-ENTMCNC: 31.1 G/DL (ref 32–36)
MCV RBC AUTO: 77.9 FL (ref 80–100)
MICROALBUMIN UR-MCNC: 0.3 MG/DL
MONOCYTES # BLD AUTO: 605 CELLS/UL (ref 200–950)
MONOCYTES NFR BLD AUTO: 8.4 %
NEUTROPHILS # BLD AUTO: 4046 CELLS/UL (ref 1500–7800)
NEUTROPHILS NFR BLD AUTO: 56.2 %
NITRITE UR QL STRIP: NEGATIVE
NONHDLC SERPL-MCNC: 139 MG/DL (CALC)
PH UR STRIP: 6.5 [PH] (ref 5–8)
PLATELET # BLD AUTO: 288 THOUSAND/UL (ref 140–400)
PMV BLD REES-ECKER: 10.3 FL (ref 7.5–12.5)
POTASSIUM SERPL-SCNC: 4.5 MMOL/L (ref 3.5–5.3)
PROT SERPL-MCNC: 6.4 G/DL (ref 6.1–8.1)
PROT UR QL STRIP: NEGATIVE
PSA SERPL-MCNC: 4.14 NG/ML
RBC # BLD AUTO: 4.88 MILLION/UL (ref 4.2–5.8)
RBC #/AREA URNS HPF: NORMAL /HPF
SODIUM SERPL-SCNC: 141 MMOL/L (ref 135–146)
SP GR UR STRIP: 1.02 (ref 1–1.03)
SQUAMOUS #/AREA URNS HPF: NORMAL /HPF
TRIGL SERPL-MCNC: 206 MG/DL
TSH SERPL-ACNC: 1.04 MIU/L (ref 0.4–4.5)
WBC # BLD AUTO: 7.2 THOUSAND/UL (ref 3.8–10.8)
WBC #/AREA URNS HPF: NORMAL /HPF

## 2021-11-01 ENCOUNTER — TELEPHONE (OUTPATIENT)
Dept: FAMILY MEDICINE | Facility: CLINIC | Age: 61
End: 2021-11-01
Payer: COMMERCIAL

## 2021-11-01 ENCOUNTER — PATIENT MESSAGE (OUTPATIENT)
Dept: FAMILY MEDICINE | Facility: CLINIC | Age: 61
End: 2021-11-01
Payer: COMMERCIAL

## 2021-11-01 DIAGNOSIS — D64.9 ANEMIA, UNSPECIFIED TYPE: ICD-10-CM

## 2021-11-01 DIAGNOSIS — Z79.899 HIGH RISK MEDICATION USE: Primary | ICD-10-CM

## 2021-11-02 ENCOUNTER — PATIENT MESSAGE (OUTPATIENT)
Dept: FAMILY MEDICINE | Facility: CLINIC | Age: 61
End: 2021-11-02
Payer: COMMERCIAL

## 2021-11-04 ENCOUNTER — PATIENT MESSAGE (OUTPATIENT)
Dept: FAMILY MEDICINE | Facility: CLINIC | Age: 61
End: 2021-11-04
Payer: COMMERCIAL

## 2021-11-04 ENCOUNTER — TELEPHONE (OUTPATIENT)
Dept: FAMILY MEDICINE | Facility: CLINIC | Age: 61
End: 2021-11-04
Payer: COMMERCIAL

## 2021-11-04 LAB — FERRITIN SERPL-MCNC: 15 NG/ML (ref 24–380)

## 2021-11-05 LAB — HEMOCCULT STL QL IA: NORMAL

## 2021-11-06 ENCOUNTER — OFFICE VISIT (OUTPATIENT)
Dept: URGENT CARE | Facility: CLINIC | Age: 61
End: 2021-11-06
Payer: COMMERCIAL

## 2021-11-06 VITALS
RESPIRATION RATE: 16 BRPM | TEMPERATURE: 98 F | HEART RATE: 80 BPM | BODY MASS INDEX: 27.17 KG/M2 | HEIGHT: 73 IN | OXYGEN SATURATION: 98 % | SYSTOLIC BLOOD PRESSURE: 129 MMHG | DIASTOLIC BLOOD PRESSURE: 71 MMHG | WEIGHT: 205 LBS

## 2021-11-06 DIAGNOSIS — Z20.822 COVID-19 VIRUS NOT DETECTED: ICD-10-CM

## 2021-11-06 DIAGNOSIS — Z20.822 ENCOUNTER FOR LABORATORY TESTING FOR COVID-19 VIRUS: Primary | ICD-10-CM

## 2021-11-06 LAB
CTP QC/QA: YES
SARS-COV-2 RDRP RESP QL NAA+PROBE: NEGATIVE

## 2021-11-06 PROCEDURE — 1160F RVW MEDS BY RX/DR IN RCRD: CPT | Mod: CPTII,S$GLB,, | Performed by: NURSE PRACTITIONER

## 2021-11-06 PROCEDURE — 1160F PR REVIEW ALL MEDS BY PRESCRIBER/CLIN PHARMACIST DOCUMENTED: ICD-10-PCS | Mod: CPTII,S$GLB,, | Performed by: NURSE PRACTITIONER

## 2021-11-06 PROCEDURE — 99203 PR OFFICE/OUTPT VISIT, NEW, LEVL III, 30-44 MIN: ICD-10-PCS | Mod: S$GLB,,, | Performed by: NURSE PRACTITIONER

## 2021-11-06 PROCEDURE — 3074F SYST BP LT 130 MM HG: CPT | Mod: CPTII,S$GLB,, | Performed by: NURSE PRACTITIONER

## 2021-11-06 PROCEDURE — 99203 OFFICE O/P NEW LOW 30 MIN: CPT | Mod: S$GLB,,, | Performed by: NURSE PRACTITIONER

## 2021-11-06 PROCEDURE — U0002: ICD-10-PCS | Mod: QW,S$GLB,, | Performed by: NURSE PRACTITIONER

## 2021-11-06 PROCEDURE — 3078F DIAST BP <80 MM HG: CPT | Mod: CPTII,S$GLB,, | Performed by: NURSE PRACTITIONER

## 2021-11-06 PROCEDURE — 3074F PR MOST RECENT SYSTOLIC BLOOD PRESSURE < 130 MM HG: ICD-10-PCS | Mod: CPTII,S$GLB,, | Performed by: NURSE PRACTITIONER

## 2021-11-06 PROCEDURE — 3078F PR MOST RECENT DIASTOLIC BLOOD PRESSURE < 80 MM HG: ICD-10-PCS | Mod: CPTII,S$GLB,, | Performed by: NURSE PRACTITIONER

## 2021-11-06 PROCEDURE — 3008F PR BODY MASS INDEX (BMI) DOCUMENTED: ICD-10-PCS | Mod: CPTII,S$GLB,, | Performed by: NURSE PRACTITIONER

## 2021-11-06 PROCEDURE — 1159F MED LIST DOCD IN RCRD: CPT | Mod: CPTII,S$GLB,, | Performed by: NURSE PRACTITIONER

## 2021-11-06 PROCEDURE — U0002 COVID-19 LAB TEST NON-CDC: HCPCS | Mod: QW,S$GLB,, | Performed by: NURSE PRACTITIONER

## 2021-11-06 PROCEDURE — 3008F BODY MASS INDEX DOCD: CPT | Mod: CPTII,S$GLB,, | Performed by: NURSE PRACTITIONER

## 2021-11-06 PROCEDURE — 1159F PR MEDICATION LIST DOCUMENTED IN MEDICAL RECORD: ICD-10-PCS | Mod: CPTII,S$GLB,, | Performed by: NURSE PRACTITIONER

## 2021-11-16 ENCOUNTER — TELEPHONE (OUTPATIENT)
Dept: UROLOGY | Facility: CLINIC | Age: 61
End: 2021-11-16
Payer: COMMERCIAL

## 2021-11-16 DIAGNOSIS — R97.20 ELEVATED PSA: Primary | ICD-10-CM

## 2021-11-19 ENCOUNTER — PATIENT MESSAGE (OUTPATIENT)
Dept: FAMILY MEDICINE | Facility: CLINIC | Age: 61
End: 2021-11-19
Payer: COMMERCIAL

## 2021-11-29 DIAGNOSIS — F51.01 PRIMARY INSOMNIA: ICD-10-CM

## 2021-11-29 RX ORDER — ZOLPIDEM TARTRATE 5 MG/1
5 TABLET ORAL NIGHTLY
Qty: 90 TABLET | Refills: 1 | Status: CANCELLED | OUTPATIENT
Start: 2021-11-29

## 2021-11-30 DIAGNOSIS — F51.01 PRIMARY INSOMNIA: ICD-10-CM

## 2021-11-30 RX ORDER — ZOLPIDEM TARTRATE 5 MG/1
5 TABLET ORAL NIGHTLY
Qty: 90 TABLET | Refills: 1 | Status: SHIPPED | OUTPATIENT
Start: 2021-11-30 | End: 2022-05-10 | Stop reason: SDUPTHER

## 2021-12-20 ENCOUNTER — PATIENT MESSAGE (OUTPATIENT)
Dept: FAMILY MEDICINE | Facility: CLINIC | Age: 61
End: 2021-12-20
Payer: COMMERCIAL

## 2021-12-20 DIAGNOSIS — F33.42 RECURRENT MAJOR DEPRESSIVE DISORDER, IN FULL REMISSION: ICD-10-CM

## 2021-12-20 RX ORDER — BUPROPION HYDROCHLORIDE 150 MG/1
150 TABLET ORAL DAILY
Qty: 90 TABLET | Refills: 0 | Status: ON HOLD | OUTPATIENT
Start: 2021-12-20 | End: 2022-06-28 | Stop reason: HOSPADM

## 2021-12-21 ENCOUNTER — TELEPHONE (OUTPATIENT)
Dept: FAMILY MEDICINE | Facility: CLINIC | Age: 61
End: 2021-12-21
Payer: COMMERCIAL

## 2021-12-29 ENCOUNTER — PATIENT MESSAGE (OUTPATIENT)
Dept: UROLOGY | Facility: CLINIC | Age: 61
End: 2021-12-29
Payer: COMMERCIAL

## 2022-02-16 ENCOUNTER — OFFICE VISIT (OUTPATIENT)
Dept: FAMILY MEDICINE | Facility: CLINIC | Age: 62
End: 2022-02-16
Payer: COMMERCIAL

## 2022-02-16 ENCOUNTER — PATIENT MESSAGE (OUTPATIENT)
Dept: FAMILY MEDICINE | Facility: CLINIC | Age: 62
End: 2022-02-16

## 2022-02-16 VITALS
HEART RATE: 84 BPM | SYSTOLIC BLOOD PRESSURE: 138 MMHG | DIASTOLIC BLOOD PRESSURE: 84 MMHG | WEIGHT: 210 LBS | BODY MASS INDEX: 27.83 KG/M2 | HEIGHT: 73 IN

## 2022-02-16 DIAGNOSIS — R06.09 DOE (DYSPNEA ON EXERTION): ICD-10-CM

## 2022-02-16 DIAGNOSIS — D50.8 IRON DEFICIENCY ANEMIA SECONDARY TO INADEQUATE DIETARY IRON INTAKE: ICD-10-CM

## 2022-02-16 DIAGNOSIS — F51.01 PRIMARY INSOMNIA: ICD-10-CM

## 2022-02-16 DIAGNOSIS — Z82.0 FAMILY HISTORY OF ALZHEIMER'S DISEASE: Primary | ICD-10-CM

## 2022-02-16 DIAGNOSIS — F33.42 RECURRENT MAJOR DEPRESSIVE DISORDER, IN FULL REMISSION: Primary | ICD-10-CM

## 2022-02-16 DIAGNOSIS — K21.9 GASTROESOPHAGEAL REFLUX DISEASE, UNSPECIFIED WHETHER ESOPHAGITIS PRESENT: ICD-10-CM

## 2022-02-16 DIAGNOSIS — R97.20 ELEVATED PSA: ICD-10-CM

## 2022-02-16 PROCEDURE — 3075F PR MOST RECENT SYSTOLIC BLOOD PRESS GE 130-139MM HG: ICD-10-PCS | Mod: S$GLB,,, | Performed by: NURSE PRACTITIONER

## 2022-02-16 PROCEDURE — 3079F DIAST BP 80-89 MM HG: CPT | Mod: S$GLB,,, | Performed by: NURSE PRACTITIONER

## 2022-02-16 PROCEDURE — 99214 OFFICE O/P EST MOD 30 MIN: CPT | Mod: S$GLB,,, | Performed by: NURSE PRACTITIONER

## 2022-02-16 PROCEDURE — 3008F BODY MASS INDEX DOCD: CPT | Mod: S$GLB,,, | Performed by: NURSE PRACTITIONER

## 2022-02-16 PROCEDURE — 3079F PR MOST RECENT DIASTOLIC BLOOD PRESSURE 80-89 MM HG: ICD-10-PCS | Mod: S$GLB,,, | Performed by: NURSE PRACTITIONER

## 2022-02-16 PROCEDURE — 3075F SYST BP GE 130 - 139MM HG: CPT | Mod: S$GLB,,, | Performed by: NURSE PRACTITIONER

## 2022-02-16 PROCEDURE — 3008F PR BODY MASS INDEX (BMI) DOCUMENTED: ICD-10-PCS | Mod: S$GLB,,, | Performed by: NURSE PRACTITIONER

## 2022-02-16 PROCEDURE — 1160F RVW MEDS BY RX/DR IN RCRD: CPT | Mod: S$GLB,,, | Performed by: NURSE PRACTITIONER

## 2022-02-16 PROCEDURE — 1160F PR REVIEW ALL MEDS BY PRESCRIBER/CLIN PHARMACIST DOCUMENTED: ICD-10-PCS | Mod: S$GLB,,, | Performed by: NURSE PRACTITIONER

## 2022-02-16 PROCEDURE — 99214 PR OFFICE/OUTPT VISIT, EST, LEVL IV, 30-39 MIN: ICD-10-PCS | Mod: S$GLB,,, | Performed by: NURSE PRACTITIONER

## 2022-02-16 RX ORDER — BUPROPION HYDROCHLORIDE 150 MG/1
150 TABLET, EXTENDED RELEASE ORAL 2 TIMES DAILY
Qty: 60 TABLET | Refills: 11 | Status: SHIPPED | OUTPATIENT
Start: 2022-02-16 | End: 2023-02-20 | Stop reason: SDUPTHER

## 2022-02-16 NOTE — PROGRESS NOTES
SUBJECTIVE:    Patient ID: Jean Bates is a 61 y.o. male.    Chief Complaint: Depression (No bottles// refuses flu shot// Discuss medications-MJ)    Pt presents for routine follow-up. Noted continued rise of pt's PSA last year and sent to Urology. He is now seeing a new Urologist Dr. Monsalve for workup.   Has started seeing a therapist for his depression. States therapist told him that he is an alcoholic though her doesn't necessarily agree. States he doesn't drink everyday but when he does drink, he thinks he probably drinks too much. It was suggested to him to try Wellbutrin SR over the Wellbutrin XL as may better help with his depression symptoms.   Still experiencing SOB with short bursts of exercise. No  Problem running on a treadmill but if he does a sudden short burst of exercise (like running with his dog) finds he is very winded. Has had Cardiac and Pulmonary workup both of which were negative. On last set of labs, found to have iron deficiency anemia. He is taking daily iron and Vit C.   Still taking Ambien nightly for insomnia.           Office Visit on 11/06/2021   Component Date Value Ref Range Status    POC Rapid COVID 11/06/2021 Negative  Negative Final     Acceptable 11/06/2021 Yes   Final   Patient Message on 11/01/2021   Component Date Value Ref Range Status    Ferritin 11/03/2021 15* 24 - 380 ng/mL Final    Fecal Globin by Immunochem. (Medic* 11/04/2021    Final   Office Visit on 10/27/2021   Component Date Value Ref Range Status    WBC 10/28/2021 7.2  3.8 - 10.8 Thousand/uL Final    RBC 10/28/2021 4.88  4.20 - 5.80 Million/uL Final    Hemoglobin 10/28/2021 11.8* 13.2 - 17.1 g/dL Final    Hematocrit 10/28/2021 38.0* 38.5 - 50.0 % Final    MCV 10/28/2021 77.9* 80.0 - 100.0 fL Final    MCH 10/28/2021 24.2* 27.0 - 33.0 pg Final    MCHC 10/28/2021 31.1* 32.0 - 36.0 g/dL Final    RDW 10/28/2021 14.7  11.0 - 15.0 % Final    Platelets 10/28/2021 288  140 - 400  Thousand/uL Final    MPV 10/28/2021 10.3  7.5 - 12.5 fL Final    Neutrophils, Abs 10/28/2021 4,046  1,500 - 7,800 cells/uL Final    Lymph # 10/28/2021 2,268  850 - 3,900 cells/uL Final    Mono # 10/28/2021 605  200 - 950 cells/uL Final    Eos # 10/28/2021 209  15 - 500 cells/uL Final    Baso # 10/28/2021 72  0 - 200 cells/uL Final    Neutrophils Relative 10/28/2021 56.2  % Final    Lymph % 10/28/2021 31.5  % Final    Mono % 10/28/2021 8.4  % Final    Eosinophil % 10/28/2021 2.9  % Final    Basophil % 10/28/2021 1.0  % Final    Glucose 10/28/2021 87  65 - 99 mg/dL Final    BUN 10/28/2021 12  7 - 25 mg/dL Final    Creatinine 10/28/2021 0.96  0.70 - 1.25 mg/dL Final    eGFR if non  10/28/2021 86  > OR = 60 mL/min/1.73m2 Final    eGFR if  10/28/2021 99  > OR = 60 mL/min/1.73m2 Final    BUN/Creatinine Ratio 10/28/2021 NOT APPLICABLE  6 - 22 (calc) Final    Sodium 10/28/2021 141  135 - 146 mmol/L Final    Potassium 10/28/2021 4.5  3.5 - 5.3 mmol/L Final    Chloride 10/28/2021 105  98 - 110 mmol/L Final    CO2 10/28/2021 27  20 - 32 mmol/L Final    Calcium 10/28/2021 9.2  8.6 - 10.3 mg/dL Final    Total Protein 10/28/2021 6.4  6.1 - 8.1 g/dL Final    Albumin 10/28/2021 4.0  3.6 - 5.1 g/dL Final    Globulin, Total 10/28/2021 2.4  1.9 - 3.7 g/dL (calc) Final    Albumin/Globulin Ratio 10/28/2021 1.7  1.0 - 2.5 (calc) Final    Total Bilirubin 10/28/2021 0.5  0.2 - 1.2 mg/dL Final    Alkaline Phosphatase 10/28/2021 62  35 - 144 U/L Final    AST 10/28/2021 23  10 - 35 U/L Final    ALT 10/28/2021 25  9 - 46 U/L Final    Cholesterol 10/28/2021 188  <200 mg/dL Final    HDL 10/28/2021 49  > OR = 40 mg/dL Final    Triglycerides 10/28/2021 206* <150 mg/dL Final    LDL Cholesterol 10/28/2021 107* mg/dL (calc) Final    HDL/Cholesterol Ratio 10/28/2021 3.8  <5.0 (calc) Final    Non HDL Chol. (LDL+VLDL) 10/28/2021 139* <130 mg/dL (calc) Final    Color, UA 10/28/2021  YELLOW  YELLOW Final    Appearance, UA 10/28/2021 CLEAR  CLEAR Final    Specific [place], UA 10/28/2021 1.022  1.001 - 1.035 Final    pH, UA 10/28/2021 6.5  5.0 - 8.0 Final    Glucose, UA 10/28/2021 NEGATIVE  NEGATIVE Final    Bilirubin, UA 10/28/2021 NEGATIVE  NEGATIVE Final    Ketones, UA 10/28/2021 NEGATIVE  NEGATIVE Final    Occult Blood UA 10/28/2021 NEGATIVE  NEGATIVE Final    Protein, UA 10/28/2021 NEGATIVE  NEGATIVE Final    Nitrite, UA 10/28/2021 NEGATIVE  NEGATIVE Final    Leukocytes, UA 10/28/2021 NEGATIVE  NEGATIVE Final    WBC Casts, UA 10/28/2021 NONE SEEN  < OR = 5 /HPF Final    RBC Casts, UA 10/28/2021 NONE SEEN  < OR = 2 /HPF Final    Squam Epithel, UA 10/28/2021 NONE SEEN  < OR = 5 /HPF Final    Bacteria, UA 10/28/2021 NONE SEEN  NONE SEEN /HPF Final    Hyaline Casts, UA 10/28/2021 NONE SEEN  NONE SEEN /LPF Final    Reflexive Urine Culture 10/28/2021    Final    PROSTATE SPECIFIC ANTIGEN, SCR - Q* 10/28/2021 4.14* < OR = 4.00 ng/mL Final    TSH w/reflex to FT4 10/28/2021 1.04  0.40 - 4.50 mIU/L Final    Creatinine, Urine 10/28/2021 147  20 - 320 mg/dL Final    Microalb, Ur 10/28/2021 0.3  See Note: mg/dL Final    Microalb/Creat Ratio 10/28/2021 2  <30 mcg/mg creat Final       Past Medical History:   Diagnosis Date    Depression     GERD (gastroesophageal reflux disease)      Past Surgical History:   Procedure Laterality Date    HERNIA REPAIR       Family History   Problem Relation Age of Onset    Alzheimer's disease Mother     No Known Problems Father        Marital Status:   Alcohol History:  reports current alcohol use.  Tobacco History:  reports that he has never smoked. He has never used smokeless tobacco.  Drug History:  reports no history of drug use.    Review of patient's allergies indicates:  No Known Allergies    Current Outpatient Medications:     buPROPion (WELLBUTRIN SR) 150 MG TBSR 12 hr tablet, Take 1 tablet (150 mg total) by mouth 2 (two) times  "daily., Disp: 60 tablet, Rfl: 11    buPROPion (WELLBUTRIN XL) 150 MG TB24 tablet, Take 1 tablet (150 mg total) by mouth once daily., Disp: 90 tablet, Rfl: 0    citalopram (CELEXA) 20 MG tablet, Take 1 tablet (20 mg total) by mouth once daily., Disp: 90 tablet, Rfl: 1    omeprazole (PRILOSEC OTC) 20 MG tablet, Take 20 mg by mouth daily as needed., Disp: , Rfl:     omeprazole (PRILOSEC) 20 MG capsule, Take 1 capsule (20 mg total) by mouth once daily., Disp: 180 capsule, Rfl: 1    sildenafiL (VIAGRA) 50 MG tablet, Take 1 tablet (50 mg total) by mouth daily as needed for Erectile Dysfunction., Disp: 15 tablet, Rfl: 1    zolpidem (AMBIEN) 5 MG Tab, Take 1 tablet (5 mg total) by mouth every evening., Disp: 90 tablet, Rfl: 1    Review of Systems   Constitutional: Negative for activity change, fatigue and unexpected weight change.   HENT: Negative.    Respiratory: Negative for chest tightness and shortness of breath.    Cardiovascular: Negative for chest pain and palpitations.   Gastrointestinal: Negative.    Musculoskeletal: Negative.    Neurological: Negative.    Psychiatric/Behavioral: Positive for dysphoric mood and sleep disturbance.          Objective:      Vitals:    02/16/22 1435 02/16/22 1504 02/16/22 1505   BP: (!) 152/84 136/88 138/84   Pulse: 84     Weight: 95.3 kg (210 lb)     Height: 6' 1" (1.854 m)       Body mass index is 27.71 kg/m².  Physical Exam  Constitutional:       Appearance: Normal appearance.   HENT:      Head: Normocephalic and atraumatic.      Mouth/Throat:      Mouth: Mucous membranes are moist.      Pharynx: Oropharynx is clear.   Cardiovascular:      Rate and Rhythm: Normal rate and regular rhythm.      Heart sounds: No murmur heard.      Pulmonary:      Effort: Pulmonary effort is normal. No respiratory distress.      Breath sounds: Normal breath sounds.   Abdominal:      General: There is no distension.      Tenderness: There is no abdominal tenderness.   Skin:     General: Skin is " warm.   Neurological:      Mental Status: He is alert and oriented to person, place, and time.   Psychiatric:         Mood and Affect: Mood normal. Affect is blunt.         Behavior: Behavior normal.           Assessment:       1. Recurrent major depressive disorder, in full remission    2. Iron deficiency anemia secondary to inadequate dietary iron intake    3. Primary insomnia    4. H[place] (dyspnea on exertion)    5. Gastroesophageal reflux disease, unspecified whether esophagitis present    6. Elevated PSA    7. BMI 27.0-27.9,adult         Plan:       Recurrent major depressive disorder, in full remission  -     buPROPion (WELLBUTRIN SR) 150 MG TBSR 12 hr tablet; Take 1 tablet (150 mg total) by mouth 2 (two) times daily.  Dispense: 60 tablet; Refill: 11  - I am okay with trialing Wellbutrin SR    Iron deficiency anemia secondary to inadequate dietary iron intake  -     Ferritin; Future; Expected date: 02/16/2022  -     Iron and TIBC; Future; Expected date: 02/16/2022  - Pt would like to decrease how often he takes iron. Recommend repeating labs today    Primary insomnia  - Stable. Continue Ambien    H[place] (dyspnea on exertion)  - Unsure of etiology as all workup to this point has been negative. Continue monitoring symptoms for now.    Gastroesophageal reflux disease, unspecified whether esophagitis present    Elevated PSA  - Workup per Dr. Monsalve    BMI 27.0-27.9,adult      Follow up in about 6 months (around 8/16/2022).

## 2022-02-18 ENCOUNTER — TELEPHONE (OUTPATIENT)
Dept: FAMILY MEDICINE | Facility: CLINIC | Age: 62
End: 2022-02-18
Payer: COMMERCIAL

## 2022-02-18 ENCOUNTER — PATIENT MESSAGE (OUTPATIENT)
Dept: FAMILY MEDICINE | Facility: CLINIC | Age: 62
End: 2022-02-18
Payer: COMMERCIAL

## 2022-02-18 LAB
FERRITIN SERPL-MCNC: 47 NG/ML (ref 24–380)
IRON SATN MFR SERPL: 41 % (CALC) (ref 20–48)
IRON SERPL-MCNC: 199 MCG/DL (ref 50–180)
TIBC SERPL-MCNC: 486 MCG/DL (CALC) (ref 250–425)

## 2022-05-10 ENCOUNTER — PATIENT MESSAGE (OUTPATIENT)
Dept: FAMILY MEDICINE | Facility: CLINIC | Age: 62
End: 2022-05-10

## 2022-05-10 DIAGNOSIS — F51.01 PRIMARY INSOMNIA: ICD-10-CM

## 2022-05-10 DIAGNOSIS — F33.42 RECURRENT MAJOR DEPRESSIVE DISORDER, IN FULL REMISSION: ICD-10-CM

## 2022-05-10 RX ORDER — ZOLPIDEM TARTRATE 5 MG/1
5 TABLET ORAL NIGHTLY
Qty: 90 TABLET | Refills: 0 | Status: SHIPPED | OUTPATIENT
Start: 2022-05-10 | End: 2022-08-30 | Stop reason: SDUPTHER

## 2022-05-10 RX ORDER — CITALOPRAM 20 MG/1
20 TABLET, FILM COATED ORAL DAILY
Qty: 90 TABLET | Refills: 0 | Status: SHIPPED | OUTPATIENT
Start: 2022-05-10 | End: 2022-09-08 | Stop reason: SDUPTHER

## 2022-05-11 ENCOUNTER — TELEPHONE (OUTPATIENT)
Dept: FAMILY MEDICINE | Facility: CLINIC | Age: 62
End: 2022-05-11

## 2022-05-11 NOTE — TELEPHONE ENCOUNTER
Spoke with pt about message sent. Pt is needing his prescriptions sent into his pharmacy. Verbalized that the prescription were sent in yesterday. Pt acknowledge understanding.

## 2022-05-11 NOTE — TELEPHONE ENCOUNTER
----- Message from Dara Chadwick sent at 5/11/2022 10:32 AM CDT -----  Vm- pt needs a refill on anti depressant and another medication he did not leave the name. Called back and left a message to call with medication  Naga ohara   [phone]

## 2022-05-11 NOTE — TELEPHONE ENCOUNTER
----- Message from Dara Chadwick sent at 5/11/2022  3:00 PM CDT -----  - pt is calling back about his medication it is zolpidem and citalopram   Naga chavez

## 2022-05-26 ENCOUNTER — TELEPHONE (OUTPATIENT)
Dept: FAMILY MEDICINE | Facility: CLINIC | Age: 62
End: 2022-05-26

## 2022-05-26 NOTE — TELEPHONE ENCOUNTER
Spoke to pt states he has been having arm pain for 6 weeks now. Says he thought it was due to a pulled muscle, but pain has not resolved. Pt does not want to go to UC. Pt states he is ok to wait to be seen next week. Pt has been scheduled. rx have been sent to pharm on 5/10/22

## 2022-05-26 NOTE — TELEPHONE ENCOUNTER
----- Message from Tashia Patten sent at 5/26/2022  3:36 PM CDT -----  Pt wants to be seen tomorrow. Arm pain and refills. Please advise. Pt #[phone]

## 2022-06-26 ENCOUNTER — HOSPITAL ENCOUNTER (OUTPATIENT)
Facility: HOSPITAL | Age: 62
Discharge: HOME | End: 2022-06-28
Attending: EMERGENCY MEDICINE | Admitting: INTERNAL MEDICINE
Payer: COMMERCIAL

## 2022-06-26 DIAGNOSIS — R07.89 ATYPICAL CHEST PAIN: ICD-10-CM

## 2022-06-26 DIAGNOSIS — R07.9 CHEST PAIN, UNSPECIFIED TYPE: Primary | ICD-10-CM

## 2022-06-26 DIAGNOSIS — R07.9 CHEST PAIN: ICD-10-CM

## 2022-06-26 DIAGNOSIS — D73.5 SPLENIC INFARCT: ICD-10-CM

## 2022-06-26 PROBLEM — F10.20 UNCOMPLICATED ALCOHOL DEPENDENCE: Status: ACTIVE | Noted: 2022-06-26

## 2022-06-26 PROBLEM — Z87.19 HX OF HIATAL HERNIA: Status: ACTIVE | Noted: 2022-06-26

## 2022-06-26 LAB
ABO + RH BLD: NORMAL
ALBUMIN SERPL BCP-MCNC: 3.8 G/DL (ref 3.5–5.2)
ALP SERPL-CCNC: 56 U/L (ref 55–135)
ALT SERPL W/O P-5'-P-CCNC: 21 U/L (ref 10–44)
ANION GAP SERPL CALC-SCNC: 9 MMOL/L (ref 8–16)
AST SERPL-CCNC: 27 U/L (ref 10–40)
BASOPHILS # BLD AUTO: 0.07 K/UL (ref 0–0.2)
BASOPHILS NFR BLD: 0.8 % (ref 0–1.9)
BILIRUB SERPL-MCNC: 0.8 MG/DL (ref 0.1–1)
BILIRUB UR QL STRIP: ABNORMAL
BLD GP AB SCN CELLS X3 SERPL QL: NORMAL
BNP SERPL-MCNC: 32 PG/ML (ref 0–99)
BUN SERPL-MCNC: 13 MG/DL (ref 8–23)
CALCIUM SERPL-MCNC: 8.7 MG/DL (ref 8.7–10.5)
CHLORIDE SERPL-SCNC: 104 MMOL/L (ref 95–110)
CLARITY UR: CLEAR
CO2 SERPL-SCNC: 24 MMOL/L (ref 23–29)
COLOR UR: YELLOW
CREAT SERPL-MCNC: 1.2 MG/DL (ref 0.5–1.4)
DIFFERENTIAL METHOD: ABNORMAL
EOSINOPHIL # BLD AUTO: 0.3 K/UL (ref 0–0.5)
EOSINOPHIL NFR BLD: 3.1 % (ref 0–8)
ERYTHROCYTE [DISTWIDTH] IN BLOOD BY AUTOMATED COUNT: 12.2 % (ref 11.5–14.5)
EST. GFR  (AFRICAN AMERICAN): >60 ML/MIN/1.73 M^2
EST. GFR  (NON AFRICAN AMERICAN): >60 ML/MIN/1.73 M^2
GLUCOSE SERPL-MCNC: 86 MG/DL (ref 70–110)
GLUCOSE UR QL STRIP: ABNORMAL
HCT VFR BLD AUTO: 44.3 % (ref 40–54)
HGB BLD-MCNC: 15.7 G/DL (ref 14–18)
HGB UR QL STRIP: NEGATIVE
IMM GRANULOCYTES # BLD AUTO: 0.02 K/UL (ref 0–0.04)
IMM GRANULOCYTES NFR BLD AUTO: 0.2 % (ref 0–0.5)
INR PPP: 1.2
KETONES UR QL STRIP: ABNORMAL
LEUKOCYTE ESTERASE UR QL STRIP: NEGATIVE
LIPASE SERPL-CCNC: 34 U/L (ref 4–60)
LYMPHOCYTES # BLD AUTO: 2.7 K/UL (ref 1–4.8)
LYMPHOCYTES NFR BLD: 32.4 % (ref 18–48)
MAGNESIUM SERPL-MCNC: 2 MG/DL (ref 1.6–2.6)
MCH RBC QN AUTO: 32 PG (ref 27–31)
MCHC RBC AUTO-ENTMCNC: 35.4 G/DL (ref 32–36)
MCV RBC AUTO: 90 FL (ref 82–98)
MONOCYTES # BLD AUTO: 0.9 K/UL (ref 0.3–1)
MONOCYTES NFR BLD: 10.9 % (ref 4–15)
NEUTROPHILS # BLD AUTO: 4.3 K/UL (ref 1.8–7.7)
NEUTROPHILS NFR BLD: 52.6 % (ref 38–73)
NITRITE UR QL STRIP: NEGATIVE
NRBC BLD-RTO: 0 /100 WBC
PH UR STRIP: 6 [PH] (ref 5–8)
PLATELET # BLD AUTO: 233 K/UL (ref 150–450)
PMV BLD AUTO: 9.5 FL (ref 9.2–12.9)
POTASSIUM SERPL-SCNC: 4 MMOL/L (ref 3.5–5.1)
PROT SERPL-MCNC: 6.4 G/DL (ref 6–8.4)
PROT UR QL STRIP: ABNORMAL
PROTHROMBIN TIME: 14.6 SEC (ref 11.4–13.7)
RBC # BLD AUTO: 4.91 M/UL (ref 4.6–6.2)
SARS-COV-2 RDRP RESP QL NAA+PROBE: NEGATIVE
SODIUM SERPL-SCNC: 137 MMOL/L (ref 136–145)
SP GR UR STRIP: >1.03 (ref 1–1.03)
TROPONIN I SERPL DL<=0.01 NG/ML-MCNC: <0.03 NG/ML
TROPONIN I SERPL DL<=0.01 NG/ML-MCNC: <0.03 NG/ML
TSH SERPL DL<=0.005 MIU/L-ACNC: 1.72 UIU/ML (ref 0.34–5.6)
URN SPEC COLLECT METH UR: ABNORMAL
UROBILINOGEN UR STRIP-ACNC: ABNORMAL EU/DL
WBC # BLD AUTO: 8.26 K/UL (ref 3.9–12.7)

## 2022-06-26 PROCEDURE — 81003 URINALYSIS AUTO W/O SCOPE: CPT | Performed by: EMERGENCY MEDICINE

## 2022-06-26 PROCEDURE — G0378 HOSPITAL OBSERVATION PER HR: HCPCS

## 2022-06-26 PROCEDURE — U0002 COVID-19 LAB TEST NON-CDC: HCPCS | Performed by: EMERGENCY MEDICINE

## 2022-06-26 PROCEDURE — 85610 PROTHROMBIN TIME: CPT | Performed by: EMERGENCY MEDICINE

## 2022-06-26 PROCEDURE — 80053 COMPREHEN METABOLIC PANEL: CPT | Performed by: EMERGENCY MEDICINE

## 2022-06-26 PROCEDURE — 86901 BLOOD TYPING SEROLOGIC RH(D): CPT | Performed by: EMERGENCY MEDICINE

## 2022-06-26 PROCEDURE — 63600175 PHARM REV CODE 636 W HCPCS: Performed by: EMERGENCY MEDICINE

## 2022-06-26 PROCEDURE — 25500020 PHARM REV CODE 255: Performed by: EMERGENCY MEDICINE

## 2022-06-26 PROCEDURE — 85025 COMPLETE CBC W/AUTO DIFF WBC: CPT | Performed by: EMERGENCY MEDICINE

## 2022-06-26 PROCEDURE — 93010 ELECTROCARDIOGRAM REPORT: CPT | Mod: ,,, | Performed by: SPECIALIST

## 2022-06-26 PROCEDURE — 96374 THER/PROPH/DIAG INJ IV PUSH: CPT | Mod: 59

## 2022-06-26 PROCEDURE — 84443 ASSAY THYROID STIM HORMONE: CPT | Performed by: EMERGENCY MEDICINE

## 2022-06-26 PROCEDURE — 25000003 PHARM REV CODE 250: Performed by: NURSE PRACTITIONER

## 2022-06-26 PROCEDURE — 83735 ASSAY OF MAGNESIUM: CPT | Performed by: EMERGENCY MEDICINE

## 2022-06-26 PROCEDURE — 99285 EMERGENCY DEPT VISIT HI MDM: CPT | Mod: 25

## 2022-06-26 PROCEDURE — 84484 ASSAY OF TROPONIN QUANT: CPT | Mod: 91 | Performed by: NURSE PRACTITIONER

## 2022-06-26 PROCEDURE — 93010 EKG 12-LEAD: ICD-10-PCS | Mod: ,,, | Performed by: SPECIALIST

## 2022-06-26 PROCEDURE — 96375 TX/PRO/DX INJ NEW DRUG ADDON: CPT

## 2022-06-26 PROCEDURE — 83880 ASSAY OF NATRIURETIC PEPTIDE: CPT | Performed by: EMERGENCY MEDICINE

## 2022-06-26 PROCEDURE — 25000003 PHARM REV CODE 250: Performed by: EMERGENCY MEDICINE

## 2022-06-26 PROCEDURE — 84484 ASSAY OF TROPONIN QUANT: CPT | Performed by: EMERGENCY MEDICINE

## 2022-06-26 PROCEDURE — 93005 ELECTROCARDIOGRAM TRACING: CPT | Performed by: SPECIALIST

## 2022-06-26 PROCEDURE — 83690 ASSAY OF LIPASE: CPT | Performed by: EMERGENCY MEDICINE

## 2022-06-26 PROCEDURE — 96361 HYDRATE IV INFUSION ADD-ON: CPT

## 2022-06-26 RX ORDER — FOLIC ACID 1 MG/1
1 TABLET ORAL DAILY
Status: DISCONTINUED | OUTPATIENT
Start: 2022-06-27 | End: 2022-06-28 | Stop reason: HOSPADM

## 2022-06-26 RX ORDER — CITALOPRAM 20 MG/1
20 TABLET, FILM COATED ORAL DAILY
Status: DISCONTINUED | OUTPATIENT
Start: 2022-06-27 | End: 2022-06-28 | Stop reason: HOSPADM

## 2022-06-26 RX ORDER — LORAZEPAM 1 MG/1
1 TABLET ORAL EVERY 6 HOURS PRN
Status: DISCONTINUED | OUTPATIENT
Start: 2022-06-26 | End: 2022-06-27

## 2022-06-26 RX ORDER — SODIUM CHLORIDE 0.9 % (FLUSH) 0.9 %
10 SYRINGE (ML) INJECTION
Status: DISCONTINUED | OUTPATIENT
Start: 2022-06-26 | End: 2022-06-28 | Stop reason: HOSPADM

## 2022-06-26 RX ORDER — BUPROPION HYDROCHLORIDE 150 MG/1
150 TABLET, EXTENDED RELEASE ORAL 2 TIMES DAILY
Status: DISCONTINUED | OUTPATIENT
Start: 2022-06-26 | End: 2022-06-28 | Stop reason: HOSPADM

## 2022-06-26 RX ORDER — ONDANSETRON 2 MG/ML
4 INJECTION INTRAMUSCULAR; INTRAVENOUS EVERY 8 HOURS PRN
Status: DISCONTINUED | OUTPATIENT
Start: 2022-06-26 | End: 2022-06-28 | Stop reason: HOSPADM

## 2022-06-26 RX ORDER — FAMOTIDINE 10 MG/ML
20 INJECTION INTRAVENOUS EVERY 12 HOURS
Status: DISCONTINUED | OUTPATIENT
Start: 2022-06-26 | End: 2022-06-28 | Stop reason: HOSPADM

## 2022-06-26 RX ORDER — ACETAMINOPHEN 325 MG/1
650 TABLET ORAL EVERY 8 HOURS PRN
Status: DISCONTINUED | OUTPATIENT
Start: 2022-06-26 | End: 2022-06-28 | Stop reason: HOSPADM

## 2022-06-26 RX ORDER — ASPIRIN 325 MG
325 TABLET ORAL
Status: COMPLETED | OUTPATIENT
Start: 2022-06-26 | End: 2022-06-26

## 2022-06-26 RX ORDER — TALC
6 POWDER (GRAM) TOPICAL NIGHTLY PRN
Status: DISCONTINUED | OUTPATIENT
Start: 2022-06-26 | End: 2022-06-28 | Stop reason: HOSPADM

## 2022-06-26 RX ORDER — MORPHINE SULFATE 2 MG/ML
1 INJECTION, SOLUTION INTRAMUSCULAR; INTRAVENOUS EVERY 6 HOURS PRN
Status: DISCONTINUED | OUTPATIENT
Start: 2022-06-26 | End: 2022-06-28 | Stop reason: HOSPADM

## 2022-06-26 RX ORDER — ZOLPIDEM TARTRATE 5 MG/1
5 TABLET ORAL NIGHTLY
Status: DISCONTINUED | OUTPATIENT
Start: 2022-06-26 | End: 2022-06-28 | Stop reason: HOSPADM

## 2022-06-26 RX ORDER — MORPHINE SULFATE 2 MG/ML
2 INJECTION, SOLUTION INTRAMUSCULAR; INTRAVENOUS
Status: COMPLETED | OUTPATIENT
Start: 2022-06-26 | End: 2022-06-26

## 2022-06-26 RX ADMIN — MORPHINE SULFATE 2 MG: 2 INJECTION, SOLUTION INTRAMUSCULAR; INTRAVENOUS at 05:06

## 2022-06-26 RX ADMIN — ZOLPIDEM TARTRATE 5 MG: 5 TABLET, COATED ORAL at 11:06

## 2022-06-26 RX ADMIN — FAMOTIDINE 20 MG: 10 INJECTION INTRAVENOUS at 11:06

## 2022-06-26 RX ADMIN — ASPIRIN 325 MG ORAL TABLET 325 MG: 325 PILL ORAL at 05:06

## 2022-06-26 RX ADMIN — SODIUM CHLORIDE 500 ML: 0.9 INJECTION, SOLUTION INTRAVENOUS at 05:06

## 2022-06-26 RX ADMIN — IOHEXOL 100 ML: 350 INJECTION, SOLUTION INTRAVENOUS at 06:06

## 2022-06-26 NOTE — H&P
[facility] Medicine History & Physical Examination   Patient Name: Jean Bates  MRN: [id number]  Patient Class: OP- Observation   Admission Date: 6/26/2022  5:07 PM  Length of Stay: 0  Attending Physician:   Primary Care Provider: Diann Arriaga NP  Face-to-Face encounter date: 06/26/2022  Code Status: Full Code  MPOA:  Chief Complaint: Abdominal Pain (Pt here for abd pain that started about 30 minutes ago. )        Patient information was obtained from patient, past medical records and ER records.   HISTORY OF PRESENT ILLNESS:   Jean Bates is a 62 y.o. old  male who  has a past medical history of Depression and GERD (gastroesophageal reflux disease).. The patient presented to [facility] on 6/26/2022 with a primary complaint of Abdominal Pain (Pt here for abd pain that started about 30 minutes ago. )  .     62-year-old  male presents to emergency room with atypical chest pain/abdominal pain    The patient states that about 5:00 p.m. today he was sitting and had a sudden onset left anterior or upper chest pain.  This pain felt like of pressure or throbbing sensation and last for few seconds.  Subsequently the pain began in his lower epigastric region.  He describes the pain as a tight sensation with no associated nausea vomiting diarrhea.      He denied hematemesis hemoptysis black or bloody stools lightheadedness dizziness shortness of breath or syncope.    He denies trauma.  He works as a  for [employer]    He rated the pain 8/10 intensity at its worst with no alleviating or exacerbating factors    Past medical history significant for GERD and depression.  He does have a hiatal hernia and has had hernia repairs with mesh    In the emergency room his cardiac enzymes were within normal limits his 12 lead EKG showed no acute ischemic changes a CT of his abdomen did reveal a small spleen attic infarct    The patient will be admitted for cardiac workup and GI will be  consulted.  He will be placed on H2 blocker and given IV hydration and pain manage      REVIEW OF SYSTEMS:   10 Point Review of System was performed and was found to be negative except for that mentioned already in the HPI and   Review of Systems (Negative unless checked off)  Review of Systems   Constitutional: Negative.    HENT: Negative.    Eyes: Negative.    Respiratory: Negative.    Cardiovascular: Positive for chest pain.   Gastrointestinal: Positive for abdominal pain.   Genitourinary: Negative.    Musculoskeletal: Negative.    Skin: Negative.    Neurological: Negative.    Endo/Heme/Allergies: Negative.    Psychiatric/Behavioral: Positive for depression. The patient has insomnia.            PAST MEDICAL HISTORY:     Past Medical History:   Diagnosis Date    Depression     GERD (gastroesophageal reflux disease)        PAST SURGICAL HISTORY:     Past Surgical History:   Procedure Laterality Date    HERNIA REPAIR         ALLERGIES:   Patient has no known allergies.    FAMILY HISTORY:     Family History   Problem Relation Age of Onset    Hypertension Mother     Heart disease Mother     Alzheimer's disease Mother     No Known Problems Father     Kidney disease Brother         Renal stones       SOCIAL HISTORY:     Social History     Tobacco Use    Smoking status: Never Smoker    Smokeless tobacco: Never Used   Substance Use Topics    Alcohol use: Not Currently     Alcohol/week: 4.0 standard drinks     Types: 4 Cans of beer per week     Comment: Drinks 2-4 beers daily        Social History     Substance and Sexual Activity   Sexual Activity Yes    Partners: Female        HOME MEDICATIONS:     Prior to Admission medications    Medication Sig Start Date End Date Taking? Authorizing Provider   buPROPion (WELLBUTRIN SR) 150 MG TBSR 12 hr tablet Take 1 tablet (150 mg total) by mouth 2 (two) times daily. 2/16/22 2/16/23 Yes Mariza Mckee NP   citalopram (CELEXA) 20 MG tablet Take 1 tablet (20 mg total)  "by mouth once daily. 5/10/22 5/10/23 Yes Marcio Nevarez PA-C   omeprazole (PRILOSEC) 20 MG capsule Take 1 capsule (20 mg total) by mouth once daily. 10/27/21 10/27/22 Yes Diann Arriaga NP   zolpidem (AMBIEN) 5 MG Tab Take 1 tablet (5 mg total) by mouth every evening. 5/10/22  Yes Mracio Nevarez PA-C   buPROPion (WELLBUTRIN XL) 150 MG TB24 tablet Take 1 tablet (150 mg total) by mouth once daily. 12/20/21   Jean Hernández MD   sildenafiL (VIAGRA) 50 MG tablet Take 1 tablet (50 mg total) by mouth daily as needed for Erectile Dysfunction. 3/4/21 3/4/22  Mariza Mckee NP   omeprazole (PRILOSEC OTC) 20 MG tablet Take 20 mg by mouth daily as needed.  6/26/22  Historical Provider         PHYSICAL EXAM:   BP (!) 141/93 (BP Location: Right arm, Patient Position: Lying)   Pulse 65   Temp 98 °F (36.7 °C) (Oral)   Resp 16   Ht 6' 1" (1.854 m)   Wt 93 kg (205 lb)   SpO2 98%   BMI 27.05 kg/m²   Vitals Reviewed  General appearance: Well-developed, well-nourished male in no apparent distress.  Skin: No Rash.   Neuro: Motor and sensory exams grossly intact. Good tone. Power in all 4 extremities 5/5.   HENT: Atraumatic head. Moist mucous membranes of oral cavity.  Eyes: Normal extraocular movements.   Neck: Supple. No evidence of lymphadenopathy. No thyroidomegaly.  Lungs: Clear to auscultation bilaterally. No wheezing present.   Heart: Regular rate and rhythm. S1 and S2 present with no murmurs/gallop/rub. No pedal edema. No JVD present.   Abdomen: Soft, non-distended, non-tender. No rebound tenderness/guarding. No masses or organomegaly. Bowel sounds are normal. Bladder is not palpable.   Extremities: No cyanosis, clubbing, or edema.  Psych/mental status: Alert and oriented. Cooperative. Responds appropriately to questions.   EMERGENCY DEPARTMENT LABS AND IMAGING:   Following labs were Reviewed   Recent Labs   Lab 06/26/22  1721   WBC 8.26   HGB 15.7   HCT 44.3      CALCIUM 8.7   ALBUMIN 3.8   PROT " 6.4      K 4.0   CO2 24      BUN 13   CREATININE 1.2   ALKPHOS 56   ALT 21   AST 27   BILITOT 0.8         BMP:   Recent Labs   Lab 06/26/22  1721   GLU 86      K 4.0      CO2 24   BUN 13   CREATININE 1.2   CALCIUM 8.7   MG 2.0   , CMP   Recent Labs   Lab 06/26/22  1721      K 4.0      CO2 24   GLU 86   BUN 13   CREATININE 1.2   CALCIUM 8.7   PROT 6.4   ALBUMIN 3.8   BILITOT 0.8   ALKPHOS 56   AST 27   ALT 21   ANIONGAP 9   ESTGFRAFRICA >60.0   EGFRNONAA >60.0   , CBC   Recent Labs   Lab 06/26/22  1721   WBC 8.26   HGB 15.7   HCT 44.3      , INR   Lab Results   Component Value Date    INR 1.2 06/26/2022   , Lipid Panel   Lab Results   Component Value Date    CHOL 188 10/28/2021    HDL 49 10/28/2021    LDLCALC 107 (H) 10/28/2021    TRIG 206 (H) 10/28/2021    CHOLHDL 3.8 10/28/2021   , Troponin   Recent Labs   Lab 06/26/22  1721   TROPONINI <0.030   , A1C: No results for input(s): HGBA1C in the last 4320 hours. and All labs within the past 24 hours have been reviewed  Microbiology Results (last 7 days)     ** No results found for the last 168 hours. **        CT Abdomen Pelvis With Contrast   Final Result      X-Ray Chest AP Portable   Final Result        CT Abdomen Pelvis With Contrast    Result Date: 6/26/2022  CT ABDOMEN AND PELVIS WITH CONTRAST HISTORY: Epigastric pain CMS MANDATED QUALITY DATA - CT RADIATION  436 All CT scans at this facility utilize dose modulation, iterative reconstruction, and/or weight based dosing when appropriate to reduce radiation dose to as low as reasonably achievable FINDINGS: Post infusion images were obtained from the lung bases to the pubic symphysis. 100 cc nonionic contrast was administered for the examination. Comparison is made to March 2021. The lung bases are unremarkable. There is a moderate-sized hiatal hernia, similar to the prior study. The liver has a normal appearance. The gallbladder and biliary tree are unremarkable. There is a  focal 3.7 cm hypodensity at the medial aspect of the spleen with fairly sharp margins. Its location and appearance suggest splenic infarct. This is new compared to the prior study. The pancreas and adrenal glands are normal. The left kidney is normal. Small lower pole right renal cyst is noted. The abdominal aorta is normal in caliber. There is no pathologic bowel wall thickening or evidence of obstruction. Scattered colonic diverticula are noted without evidence of diverticulitis. The appendix is normal. Images of the pelvis demonstrate no mass, lymphadenopathy, or free fluid. The urinary bladder is normal. Few sigmoid diverticula are noted. IMPRESSION: 1.  3.7 cm hypodensity at the medial aspect of the spleen suspicious for small splenic infarct, new when compared to March 2021. 2. Moderate-sized hiatal hernia. 3. Additional incidental observations as above. Electronically signed by:  Raj Ceballos MD  6/26/2022 6:32 PM CDT Workstation: [id number]    X-Ray Chest AP Portable    Result Date: 6/26/2022  Chest single view Clinical data:Chest pain. Comparison to November 2020. FINDINGS: AP view of the chest demonstrates no cardiac, pulmonary, or osseous abnormalities. IMPRESSION: 1. Normal chest single view. Electronically signed by:  Raj Ceballos MD  6/26/2022 6:04 PM CDT Workstation: [id number]      I personally reviewed and agree with the radiologist's findings    12 lead EKG reveals a normal sinus rhythm with a normal axis this poor R-wave progression this ST flattening in the anterior septal leads possible atrial enlargement rate 90 QTc:447 millisecond    ASSESSMENT & PLAN:   Jean Bates is a 62 y.o. male admitted for    1. Atypical Chest pain  -trend cardiac enzymes and troponin  -2D echo complete  -cardioprotective medications  -Gi stress test in a.m.    2. Small Splenic infarct  -IV hydration  -pain management  -general surgery consult, possible GI consult    3.  Daily alcohol use  -p.r.n.  Ativan/CIWA    4. Depression  - continue home meds to manage  - denies SI/HI       DVT Prophylaxis: will be placed on  for DVT prophylaxis and will be advised to be as mobile as possible and sit in a chair as tolerated.   _____________________________________________________________   Face-to-Face encounter date: 06/26/2022  Encounter included review of the medical records, interviewing and examining the patient face-to-face, discussion with family and other health care providers including emergency medicine physician, admission orders, interpreting lab/test results and formulating a plan of care.   Medical Decision Making during this encounter was  [_] Low Complexity  [_] Moderate Complexity  [x] High Complexity  _________________________________________________________________________________    INPATIENT LIST OF MEDICATIONS     Current Facility-Administered Medications:     acetaminophen tablet 650 mg, 650 mg, Oral, Q8H PRN, Jessica Engle NP    buPROPion TBSR 12 hr tablet 150 mg, 150 mg, Oral, BID, Jessica Engle NP    [START ON 6/27/2022] citalopram tablet 20 mg, 20 mg, Oral, Daily, Jessica Engle NP    famotidine (PF) injection 20 mg, 20 mg, Intravenous, Q12H, Jessica Engle NP    melatonin tablet 6 mg, 6 mg, Oral, Nightly PRN, Jessica Engle NP    morphine injection 1 mg, 1 mg, Intravenous, Q6H PRN, Jessica Engle NP    nitroGLYCERIN 2% TD oint ointment 0.5 inch, 0.5 inch, Topical (Top), ED 1 Time, Randolph Harris MD    ondansetron injection 4 mg, 4 mg, Intravenous, Q8H PRN, Jessica Engle NP    sodium chloride 0.9% flush 10 mL, 10 mL, Intravenous, PRN, Jessica Engle NP    Current Outpatient Medications:     buPROPion (WELLBUTRIN SR) 150 MG TBSR 12 hr tablet, Take 1 tablet (150 mg total) by mouth 2 (two) times daily., Disp: 60 tablet, Rfl: 11    citalopram (CELEXA) 20 MG tablet, Take 1 tablet (20 mg total) by mouth once daily., Disp: 90 tablet, Rfl: 0    omeprazole (PRILOSEC) 20 MG capsule, Take 1  capsule (20 mg total) by mouth once daily., Disp: 180 capsule, Rfl: 1    zolpidem (AMBIEN) 5 MG Tab, Take 1 tablet (5 mg total) by mouth every evening., Disp: 90 tablet, Rfl: 0    buPROPion (WELLBUTRIN XL) 150 MG TB24 tablet, Take 1 tablet (150 mg total) by mouth once daily., Disp: 90 tablet, Rfl: 0    sildenafiL (VIAGRA) 50 MG tablet, Take 1 tablet (50 mg total) by mouth daily as needed for Erectile Dysfunction., Disp: 15 tablet, Rfl: 1      Scheduled Meds:   buPROPion  150 mg Oral BID    [START ON 6/27/2022] citalopram  20 mg Oral Daily    famotidine (PF)  20 mg Intravenous Q12H    nitroGLYCERIN 2% TD oint  0.5 inch Topical (Top) ED 1 Time     Continuous Infusions:  PRN Meds:.      Jessica Engle  [facility] Hospitalist NP  06/26/2022

## 2022-06-26 NOTE — ED PROVIDER NOTES
Encounter Date: 6/26/2022       History     Chief Complaint   Patient presents with    Abdominal Pain     Pt here for abd pain that started about 30 minutes ago.      62-year-old male presents complaining of abdominal discomfort patient reports pain in the midepigastrium patient reports pain started this morning patient describes pain as sharp and located in the subxiphoid/epigastric region, patient denies associated nausea or vomiting patient denies change in bowel movements the patient has no other acute complaints at this time.  Patient reports that pain started as discomfort in the left side of his chest and then progressed to the epigastric region.  Patient reports no significant past medical history.        Review of patient's allergies indicates:  No Known Allergies  Past Medical History:   Diagnosis Date    Depression     GERD (gastroesophageal reflux disease)      Past Surgical History:   Procedure Laterality Date    HERNIA REPAIR       Family History   Problem Relation Age of Onset    Alzheimer's disease Mother     No Known Problems Father      Social History     Tobacco Use    Smoking status: Never Smoker    Smokeless tobacco: Never Used   Substance Use Topics    Alcohol use: Yes    Drug use: Never     Review of Systems   Constitutional: Negative for fever.   HENT: Negative for congestion, rhinorrhea, sore throat and trouble swallowing.    Eyes: Negative for visual disturbance.   Respiratory: Negative for cough, chest tightness, shortness of breath and wheezing.    Cardiovascular: Positive for chest pain. Negative for palpitations and leg swelling.   Gastrointestinal: Positive for abdominal pain. Negative for abdominal distention, constipation, diarrhea, nausea and vomiting.   Genitourinary: Negative for difficulty urinating, dysuria, flank pain and frequency.   Musculoskeletal: Negative for arthralgias, back pain, joint swelling and neck pain.   Skin: Negative for color change and rash.    Neurological: Negative for dizziness, syncope, speech difficulty, weakness, numbness and headaches.   All other systems reviewed and are negative.      Physical Exam     Initial Vitals [06/26/22 1719]   BP Pulse Resp Temp SpO2   (!) 168/96 85 18 98 °F (36.7 °C) 100 %      MAP       --         Physical Exam    Nursing note and vitals reviewed.  Constitutional: He appears well-developed and well-nourished. He is not diaphoretic. No distress.   HENT:   Head: Normocephalic and atraumatic.   Right Ear: External ear normal.   Left Ear: External ear normal.   Nose: Nose normal.   Mouth/Throat: Oropharynx is clear and moist. No oropharyngeal exudate.   Eyes: Conjunctivae and EOM are normal. Pupils are equal, round, and reactive to light. Right eye exhibits no discharge. Left eye exhibits no discharge. No scleral icterus.   Neck: Neck supple. No thyromegaly present. No tracheal deviation present. No JVD present.   Normal range of motion.  Cardiovascular: Normal rate, regular rhythm, normal heart sounds and intact distal pulses. Exam reveals no gallop and no friction rub.    No murmur heard.  Pulmonary/Chest: Breath sounds normal. No stridor. No respiratory distress. He has no wheezes. He has no rhonchi. He has no rales. He exhibits no tenderness.   Abdominal: Abdomen is soft. Bowel sounds are normal. He exhibits no distension and no mass. There is abdominal tenderness.   Patient has mild tenderness to palpation in the epigastric region, abdomen is otherwise soft and nondistended There is no rebound and no guarding.   Musculoskeletal:         General: No tenderness or edema. Normal range of motion.      Cervical back: Normal range of motion and neck supple.     Lymphadenopathy:     He has no cervical adenopathy.   Neurological: He is alert and oriented to person, place, and time. He has normal strength and normal reflexes. He displays normal reflexes. No cranial nerve deficit or sensory deficit.   Skin: Skin is warm and  dry. No rash noted. No erythema.         ED Course   Procedures  Labs Reviewed   CBC W/ AUTO DIFFERENTIAL - Abnormal; Notable for the following components:       Result Value    MCH 32.0 (*)     All other components within normal limits   URINALYSIS, REFLEX TO URINE CULTURE - Abnormal; Notable for the following components:    Specific Gravity, UA >1.030 (*)     Protein, UA Trace (*)     Glucose, UA Trace (*)     Ketones, UA Trace (*)     Bilirubin (UA) 1+ (*)     Urobilinogen, UA 4.0-6.0 (*)     All other components within normal limits    Narrative:     Specimen Source->Urine   PROTIME-INR - Abnormal; Notable for the following components:    PT 14.6 (*)     All other components within normal limits   SARS-COV-2 RNA AMPLIFICATION, QUAL   B-TYPE NATRIURETIC PEPTIDE   COMPREHENSIVE METABOLIC PANEL   LIPASE   MAGNESIUM   TROPONIN I   TSH   TROPONIN I   TYPE & SCREEN          Imaging Results          CT Abdomen Pelvis With Contrast (Final result)  Result time 06/26/22 18:32:28    Final result by Raj Ceballos MD (06/26/22 18:32:28)                 Narrative:    CT ABDOMEN AND PELVIS WITH CONTRAST    HISTORY: Epigastric pain    CMS MANDATED QUALITY DATA - CT RADIATION  436    All CT scans at this facility utilize dose modulation, iterative reconstruction, and/or weight based dosing when appropriate to reduce radiation dose to as low as reasonably achievable    FINDINGS:    Post infusion images were obtained from the lung bases to the pubic symphysis. 100 cc nonionic contrast was administered for the examination. Comparison is made to March 2021.    The lung bases are unremarkable. There is a moderate-sized hiatal hernia, similar to the prior study.    The liver has a normal appearance. The gallbladder and biliary tree are unremarkable. There is a focal 3.7 cm hypodensity at the medial aspect of the spleen with fairly sharp margins. Its location and appearance suggest splenic infarct. This is new compared to the  prior study. The pancreas and adrenal glands are normal. The left kidney is normal. Small lower pole right renal cyst is noted. The abdominal aorta is normal in caliber.    There is no pathologic bowel wall thickening or evidence of obstruction. Scattered colonic diverticula are noted without evidence of diverticulitis. The appendix is normal.    Images of the pelvis demonstrate no mass, lymphadenopathy, or free fluid. The urinary bladder is normal. Few sigmoid diverticula are noted.    IMPRESSION:      1.  3.7 cm hypodensity at the medial aspect of the spleen suspicious for small splenic infarct, new when compared to March 2021.  2. Moderate-sized hiatal hernia.  3. Additional incidental observations as above.    Electronically signed by:  Raj Ceballos MD  6/26/2022 6:32 PM CDT Workstation: [id number]                             X-Ray Chest AP Portable (Final result)  Result time 06/26/22 18:04:17    Final result by Raj Ceballos MD (06/26/22 18:04:17)                 Narrative:    Chest single view    Clinical data:Chest pain. Comparison to November 2020.    FINDINGS: AP view of the chest demonstrates no cardiac, pulmonary, or osseous abnormalities.    IMPRESSION:  1. Normal chest single view.    Electronically signed by:  Raj Ceballos MD  6/26/2022 6:04 PM CDT Workstation: [id number]                               Medications   nitroGLYCERIN 2% TD oint ointment 0.5 inch (0 inches Topical (Top) Hold 6/26/22 1900)   buPROPion TBSR 12 hr tablet 150 mg (has no administration in time range)   citalopram tablet 20 mg (has no administration in time range)   sodium chloride 0.9% flush 10 mL (has no administration in time range)   melatonin tablet 6 mg (has no administration in time range)   morphine injection 1 mg (has no administration in time range)   famotidine (PF) injection 20 mg (has no administration in time range)   ondansetron injection 4 mg (has no administration in time range)   acetaminophen  tablet 650 mg (has no administration in time range)   sodium chloride 0.9% bolus 500 mL (0 mLs Intravenous Stopped 6/26/22 1825)   aspirin tablet 325 mg (325 mg Oral Given 6/26/22 1726)   morphine injection 2 mg (2 mg Intravenous Given 6/26/22 1725)   iohexoL (OMNIPAQUE 350) injection 100 mL (100 mLs Intravenous Given 6/26/22 1810)     Medical Decision Making:   History:   Old Medical Records: I decided to obtain old medical records.  Initial Assessment:   Emergent evaluation of a 62-year-old male presenting with abdominal pain differential diagnosis includes infection, electrolyte abnormality, endocrine dysfunction, coronary artery disease, ACS            Attending Attestation:             Attending ED Notes:   Patient reassessed and he reports that his pain has subsided patient is noted to however be diaphoretic, patient currently reports 0/10 pain, patient's vital signs are stable, patient's labs show no significant acute abnormalities patient's chest x-ray shows no significant acute abnormalities patient's CT of the abdomen and pelvis show a small splenic infarct of indeterminate acuity or significance however given patient's presentation with acute abdominal discomfort, some associated pain in the left precordium and a finding of splenic infarct there is a concern for acute thrombus, patient consulted Internal Medicine for admission and further evaluation, patient will likely require echo.               Clinical Impression:   Final diagnoses:  [R07.9] Chest pain, unspecified type (Primary)  [D73.5] Splenic infarct          ED Disposition Condition    Observation               Randolph Harris MD  06/26/22 2051

## 2022-06-27 ENCOUNTER — CLINICAL SUPPORT (OUTPATIENT)
Dept: CARDIOLOGY | Facility: HOSPITAL | Age: 62
End: 2022-06-27
Attending: INTERNAL MEDICINE
Payer: COMMERCIAL

## 2022-06-27 VITALS — WEIGHT: 205 LBS | BODY MASS INDEX: 27.17 KG/M2 | HEIGHT: 73 IN

## 2022-06-27 LAB
ALBUMIN SERPL BCP-MCNC: 3.4 G/DL (ref 3.5–5.2)
ALP SERPL-CCNC: 53 U/L (ref 55–135)
ALT SERPL W/O P-5'-P-CCNC: 20 U/L (ref 10–44)
ANION GAP SERPL CALC-SCNC: 8 MMOL/L (ref 8–16)
AST SERPL-CCNC: 20 U/L (ref 10–40)
AV INDEX (PROSTH): 0.97
AV MEAN GRADIENT: 3 MMHG
AV VALVE AREA: 3.04 CM2
BASOPHILS # BLD AUTO: 0.07 K/UL (ref 0–0.2)
BASOPHILS NFR BLD: 0.8 % (ref 0–1.9)
BILIRUB SERPL-MCNC: 1 MG/DL (ref 0.1–1)
BSA FOR ECHO PROCEDURE: 2.19 M2
BSA FOR ECHO PROCEDURE: 2.19 M2
BUN SERPL-MCNC: 12 MG/DL (ref 8–23)
CALCIUM SERPL-MCNC: 8.7 MG/DL (ref 8.7–10.5)
CHLORIDE SERPL-SCNC: 108 MMOL/L (ref 95–110)
CO2 SERPL-SCNC: 24 MMOL/L (ref 23–29)
CREAT SERPL-MCNC: 1 MG/DL (ref 0.5–1.4)
CV ECHO LV RWT: 0.37 CM
CV STRESS BASE HR: 76 BPM
DIASTOLIC BLOOD PRESSURE: 90 MMHG
DIFFERENTIAL METHOD: ABNORMAL
DOP CALC AO VTI: 23.1 CM
DOP CALC LVOT AREA: 3.1 CM2
DOP CALC LVOT DIAMETER: 2 CM
DOP CALC LVOT PEAK VEL: 101.69 M/S
DOP CALC LVOT STROKE VOLUME: 70.18 CM3
DOP CALCLVOT PEAK VEL VTI: 22.35 CM
E WAVE DECELERATION TIME: 291.4 MSEC
E/A RATIO: 1.09
E/E' RATIO: 12.71 M/S
ECHO LV POSTERIOR WALL: 0.76 CM (ref 0.6–1.1)
EJECTION FRACTION: 60 %
EJECTION FRACTION: 75 %
EOSINOPHIL # BLD AUTO: 0.3 K/UL (ref 0–0.5)
EOSINOPHIL NFR BLD: 3.5 % (ref 0–8)
ERYTHROCYTE [DISTWIDTH] IN BLOOD BY AUTOMATED COUNT: 12.1 % (ref 11.5–14.5)
EST. GFR  (AFRICAN AMERICAN): >60 ML/MIN/1.73 M^2
EST. GFR  (NON AFRICAN AMERICAN): >60 ML/MIN/1.73 M^2
FRACTIONAL SHORTENING: 41 % (ref 28–44)
GLUCOSE SERPL-MCNC: 91 MG/DL (ref 70–110)
HCT VFR BLD AUTO: 43.1 % (ref 40–54)
HGB BLD-MCNC: 14.8 G/DL (ref 14–18)
IMM GRANULOCYTES # BLD AUTO: 0.02 K/UL (ref 0–0.04)
IMM GRANULOCYTES NFR BLD AUTO: 0.2 % (ref 0–0.5)
INTERVENTRICULAR SEPTUM: 0.91 CM (ref 0.6–1.1)
LEFT ATRIUM SIZE: 3.84 CM
LEFT INTERNAL DIMENSION IN SYSTOLE: 2.39 CM (ref 2.1–4)
LEFT VENTRICLE DIASTOLIC VOLUME INDEX: 31.27 ML/M2
LEFT VENTRICLE DIASTOLIC VOLUME: 67.86 ML
LEFT VENTRICLE MASS INDEX: 47 G/M2
LEFT VENTRICLE SYSTOLIC VOLUME INDEX: 6.3 ML/M2
LEFT VENTRICLE SYSTOLIC VOLUME: 13.74 ML
LEFT VENTRICULAR INTERNAL DIMENSION IN DIASTOLE: 4.08 CM (ref 3.5–6)
LEFT VENTRICULAR MASS: 102.26 G
LV LATERAL E/E' RATIO: 11.13 M/S
LV SEPTAL E/E' RATIO: 14.83 M/S
LYMPHOCYTES # BLD AUTO: 2.7 K/UL (ref 1–4.8)
LYMPHOCYTES NFR BLD: 31.5 % (ref 18–48)
MCH RBC QN AUTO: 31.6 PG (ref 27–31)
MCHC RBC AUTO-ENTMCNC: 34.3 G/DL (ref 32–36)
MCV RBC AUTO: 92 FL (ref 82–98)
MONOCYTES # BLD AUTO: 0.8 K/UL (ref 0.3–1)
MONOCYTES NFR BLD: 8.9 % (ref 4–15)
MV PEAK A VEL: 0.82 M/S
MV PEAK E VEL: 0.89 M/S
NEUTROPHILS # BLD AUTO: 4.8 K/UL (ref 1.8–7.7)
NEUTROPHILS NFR BLD: 55.1 % (ref 38–73)
NRBC BLD-RTO: 0 /100 WBC
OHS CV CPX 1 MINUTE RECOVERY HEART RATE: 104 BPM
OHS CV CPX 85 PERCENT MAX PREDICTED HEART RATE MALE: 135
OHS CV CPX ESTIMATED METS: 6
OHS CV CPX MAX PREDICTED HEART RATE: 159
OHS CV CPX PATIENT IS FEMALE: 0
OHS CV CPX PATIENT IS MALE: 1
OHS CV CPX PEAK DIASTOLIC BLOOD PRESSURE: 86 MMHG
OHS CV CPX PEAK HEAR RATE: 128 BPM
OHS CV CPX PEAK RATE PRESSURE PRODUCT: NORMAL
OHS CV CPX PEAK SYSTOLIC BLOOD PRESSURE: 239 MMHG
OHS CV CPX PERCENT MAX PREDICTED HEART RATE ACHIEVED: 81
OHS CV CPX RATE PRESSURE PRODUCT PRESENTING: NORMAL
PISA TR MAX VEL: 2.65 M/S
PLATELET # BLD AUTO: 193 K/UL (ref 150–450)
PMV BLD AUTO: 9.9 FL (ref 9.2–12.9)
POTASSIUM SERPL-SCNC: 4.1 MMOL/L (ref 3.5–5.1)
PROT SERPL-MCNC: 5.6 G/DL (ref 6–8.4)
RBC # BLD AUTO: 4.69 M/UL (ref 4.6–6.2)
RIGHT VENTRICULAR END-DIASTOLIC DIMENSION: 351 CM
SODIUM SERPL-SCNC: 140 MMOL/L (ref 136–145)
STRESS ECHO POST EXERCISE DUR MIN: 3 MINUTES
STRESS ECHO POST EXERCISE DUR SEC: 54 SECONDS
SYSTOLIC BLOOD PRESSURE: 146 MMHG
TDI LATERAL: 0.08 M/S
TDI SEPTAL: 0.06 M/S
TDI: 0.07 M/S
TR MAX PG: 28 MMHG
TRICUSPID ANNULAR PLANE SYSTOLIC EXCURSION: 302 CM
TROPONIN I SERPL DL<=0.01 NG/ML-MCNC: <0.03 NG/ML
WBC # BLD AUTO: 8.64 K/UL (ref 3.9–12.7)

## 2022-06-27 PROCEDURE — 93351 STRESS TTE COMPLETE: CPT | Mod: 26,,, | Performed by: INTERNAL MEDICINE

## 2022-06-27 PROCEDURE — 96376 TX/PRO/DX INJ SAME DRUG ADON: CPT | Mod: 59

## 2022-06-27 PROCEDURE — 99203 OFFICE O/P NEW LOW 30 MIN: CPT | Mod: ,,, | Performed by: SURGERY

## 2022-06-27 PROCEDURE — 84484 ASSAY OF TROPONIN QUANT: CPT | Performed by: NURSE PRACTITIONER

## 2022-06-27 PROCEDURE — 80053 COMPREHEN METABOLIC PANEL: CPT | Performed by: NURSE PRACTITIONER

## 2022-06-27 PROCEDURE — G0378 HOSPITAL OBSERVATION PER HR: HCPCS

## 2022-06-27 PROCEDURE — 36415 COLL VENOUS BLD VENIPUNCTURE: CPT | Performed by: NURSE PRACTITIONER

## 2022-06-27 PROCEDURE — 93306 TTE W/DOPPLER COMPLETE: CPT | Mod: 26,,, | Performed by: INTERNAL MEDICINE

## 2022-06-27 PROCEDURE — 93306 ECHO (CUPID ONLY): ICD-10-PCS | Mod: 26,,, | Performed by: INTERNAL MEDICINE

## 2022-06-27 PROCEDURE — 93306 TTE W/DOPPLER COMPLETE: CPT | Mod: 59

## 2022-06-27 PROCEDURE — 25000003 PHARM REV CODE 250: Performed by: NURSE PRACTITIONER

## 2022-06-27 PROCEDURE — 93351 STRESS TTE COMPLETE: CPT

## 2022-06-27 PROCEDURE — 93351 STRESS ECHO (CUPID ONLY): ICD-10-PCS | Mod: 26,,, | Performed by: INTERNAL MEDICINE

## 2022-06-27 PROCEDURE — 85025 COMPLETE CBC W/AUTO DIFF WBC: CPT | Performed by: NURSE PRACTITIONER

## 2022-06-27 PROCEDURE — 25000003 PHARM REV CODE 250: Performed by: INTERNAL MEDICINE

## 2022-06-27 PROCEDURE — 99203 PR OFFICE/OUTPT VISIT, NEW, LEVL III, 30-44 MIN: ICD-10-PCS | Mod: ,,, | Performed by: SURGERY

## 2022-06-27 RX ORDER — HYDRALAZINE HYDROCHLORIDE 25 MG/1
25 TABLET, FILM COATED ORAL EVERY 8 HOURS
Status: DISCONTINUED | OUTPATIENT
Start: 2022-06-27 | End: 2022-06-28 | Stop reason: HOSPADM

## 2022-06-27 RX ADMIN — HYDRALAZINE HYDROCHLORIDE 25 MG: 25 TABLET, FILM COATED ORAL at 10:06

## 2022-06-27 RX ADMIN — BUPROPION HYDROCHLORIDE 150 MG: 150 TABLET, EXTENDED RELEASE ORAL at 12:06

## 2022-06-27 RX ADMIN — FOLIC ACID 1 MG: 1 TABLET ORAL at 12:06

## 2022-06-27 RX ADMIN — FAMOTIDINE 20 MG: 10 INJECTION INTRAVENOUS at 08:06

## 2022-06-27 RX ADMIN — FAMOTIDINE 20 MG: 10 INJECTION INTRAVENOUS at 10:06

## 2022-06-27 RX ADMIN — ACETAMINOPHEN 650 MG: 325 TABLET ORAL at 08:06

## 2022-06-27 RX ADMIN — THERA TABS 1 TABLET: TAB at 12:06

## 2022-06-27 RX ADMIN — CITALOPRAM HYDROBROMIDE 20 MG: 20 TABLET ORAL at 12:06

## 2022-06-27 RX ADMIN — BUPROPION HYDROCHLORIDE 150 MG: 150 TABLET, EXTENDED RELEASE ORAL at 08:06

## 2022-06-27 RX ADMIN — ZOLPIDEM TARTRATE 5 MG: 5 TABLET, COATED ORAL at 08:06

## 2022-06-27 NOTE — PROGRESS NOTES
"[facility] Medicine Progress Note    Subjective:      Pain has resolved. Denies abd pain, n/v/d, bleeding.      Objective:   Last 24 Hour Vital Signs:  BP  Min: 116/60  Max: 177/99  Temp  Av.3 °F (36.8 °C)  Min: 97.3 °F (36.3 °C)  Max: 98.9 °F (37.2 °C)  Pulse  Av.8  Min: 62  Max: 85  Resp  Av.2  Min: 16  Max: 20  SpO2  Av.4 %  Min: 97 %  Max: 99 %  Height  Av' 1" (185.4 cm)  Min: 6' 1" (185.4 cm)  Max: 6' 1" (185.4 cm)  Weight  Av.7 kg (204 lb 6.9 oz)  Min: 92 kg (202 lb 13.2 oz)  Max: 93.2 kg (205 lb 7.5 oz)  I/O last 3 completed shifts:  In: 740 [P.O.:240; IV Piggyback:500]  Out: -     Physical Examination:  General appearance: Well-developed, well-nourished male in no apparent distress.  Skin: No Rash.   Neuro: Motor and sensory exams grossly intact. Good tone. Power in all 4 extremities 5/5.   HENT: Atraumatic head. Moist mucous membranes of oral cavity.  Eyes: Normal extraocular movements.   Neck: Supple. No evidence of lymphadenopathy. No thyroidomegaly.  Lungs: Clear to auscultation bilaterally. No wheezing present.   Heart: Regular rate and rhythm. S1 and S2 present with no murmurs/gallop/rub. No pedal edema. No JVD present.   Abdomen: Soft, non-distended, non-tender. No rebound tenderness/guarding. No masses or organomegaly. Bowel sounds are normal. Bladder is not palpable.   Extremities: No cyanosis, clubbing, or edema.  Psych/mental status: Alert and oriented. Cooperative. Responds appropriately to questions.     Laboratory:  Laboratory Data Reviewed: yes    Most Recent Data:  CBC:   Lab Results   Component Value Date    WBC 8.64 2022    HGB 14.8 2022    HCT 43.1 2022     2022    MCV 92 2022    RDW 12.1 2022     BMP:   Lab Results   Component Value Date     2022    K 4.1 2022     2022    CO2 24 2022    BUN 12 2022    GLU 91 2022    CALCIUM 8.7 2022    MG 2.0 2022     LFTs: "   Lab Results   Component Value Date    PROT 5.6 (L) 06/27/2022    ALBUMIN 3.4 (L) 06/27/2022    BILITOT 1.0 06/27/2022    AST 20 06/27/2022    ALKPHOS 53 (L) 06/27/2022    ALT 20 06/27/2022     Coags:   Lab Results   Component Value Date    INR 1.2 06/26/2022     FLP:   Lab Results   Component Value Date    CHOL 188 10/28/2021    HDL 49 10/28/2021    LDLCALC 107 (H) 10/28/2021    TRIG 206 (H) 10/28/2021    CHOLHDL 3.8 10/28/2021     DM:   Lab Results   Component Value Date    MICROALBUR 0.3 10/28/2021    LDLCALC 107 (H) 10/28/2021    CREATININE 1.0 06/27/2022     Thyroid:   Lab Results   Component Value Date    TSH 1.720 06/26/2022     Anemia:   Lab Results   Component Value Date    IRON 199 (H) 02/17/2022    TIBC 486 (H) 02/17/2022    FERRITIN 47 02/17/2022     Cardiac:   Lab Results   Component Value Date    TROPONINI <0.030 06/27/2022    BNP 32 06/26/2022     Urinalysis:   Lab Results   Component Value Date    COLORU Yellow 06/26/2022    CLARITYU Clear 11/23/2020    SPECGRAV >1.030 (A) 06/26/2022    NITRITE Negative 06/26/2022    GLUCOSEU NEGATIVE 10/28/2021    KETONESU Trace (A) 06/26/2022    UROBILINOGEN 4.0-6.0 (A) 06/26/2022    BILIRUBINUR NEGATIVE 10/28/2021        Radiology:  Data Reviewed: yes  XR CHEST AP PORTABLE  CT ABDOMEN PELVIS WITH CONTRAST  NM MYOCARDIAL PERFUSION SPECT MULTI PHARM      Current Medications:     Infusions:       Scheduled:   buPROPion  150 mg Oral BID    citalopram  20 mg Oral Daily    famotidine (PF)  20 mg Intravenous Q12H    folic acid  1 mg Oral Daily    hydrALAZINE  25 mg Oral Q8H    multivitamin  1 tablet Oral Daily    zolpidem  5 mg Oral QHS        PRN:  acetaminophen, melatonin, morphine, ondansetron, sodium chloride 0.9%    Lines and Day Number of Therapy:      Microbiology Data:  Reviewed: yes  Microbiology Results (last 7 days)     ** No results found for the last 168 hours. **           Antibiotics and Day Number of Therapy:  Antibiotics (From admission, onward)             None         Antivirals (From admission, onward)    None             Assessment/Plan:     Jean Bates is a 62 y.o.male with    Atypical Chest pain  - serial enzymes negative   - echo pending  -Gi stress test in a.m. - stress echo today aborted due to elevated BP      Small Splenic infarct  -pain management  -general surgery consulted in ED, f/u their recommendations         Depression  - continue home meds to manage  - denies SI/HI        Wei ROSARIO Kamron  [facility] Medicine

## 2022-06-27 NOTE — PLAN OF CARE
Problem: Adult Inpatient Plan of Care  Goal: Plan of Care Review  Outcome: Ongoing, Progressing  Goal: Patient-Specific Goal (Individualized)  Outcome: Ongoing, Progressing  Goal: Absence of Hospital-Acquired Illness or Injury  Outcome: Ongoing, Progressing  Goal: Optimal Comfort and Wellbeing  Outcome: Ongoing, Progressing  Goal: Readiness for Transition of Care  Outcome: Ongoing, Progressing     Problem: Chest Pain  Goal: Resolution of Chest Pain Symptoms  Outcome: Ongoing, Progressing

## 2022-06-27 NOTE — PLAN OF CARE
Critical access hospital  Initial Discharge Assessment       Primary Care Provider: Diann Arriaga NP    Admission Diagnosis: Chest pain, unspecified type [R07.9]    Admission Date: 6/26/2022  Expected Discharge Date:     Discharge Barriers Identified: (P) None    Payor: BLUE CROSS BLUE SHIELD / Plan: BCBS ALL OUT OF STATE / Product Type: PPO /     Extended Emergency Contact Information  Primary Emergency Contact: CELESTINMISSAEL MCKEON  Mobile Phone: [phone]  Relation: Spouse   needed? No    Discharge Plan A: (P) Home, Home with family  Discharge Plan B: (P) Home, Home with family      [id number] - [place], [place] - [address]  [address]  [facility] [address]  Phone: [phone] Fax: [phone]    [facility] - [place], [place] - [address]  [address]  [place] [address]  Phone: [phone] Fax: [phone]      Initial Assessment (most recent)       Adult Discharge Assessment - 06/27/22 1002          Discharge Assessment    Assessment Type Discharge Planning Assessment (P)      Confirmed/corrected address, phone number and insurance Yes (P)      Confirmed Demographics Correct on Facesheet (P)      Source of Information patient (P)      When was your last doctors appointment? -- (P)    unknown    Communicated YESIKA with patient/caregiver Date not available/Unable to determine (P)      Reason For Admission atypical chest pain (P)      Lives With spouse (P)      Facility Arrived From: home (P)      Do you expect to return to your current living situation? Yes (P)      Do you have help at home or someone to help you manage your care at home? Yes (P)      Who are your caregiver(s) and their phone number(s)? CELESTINMISSAEL MCKEON (Spouse)   [phone] (Mobile) (P)      Prior to hospitilization cognitive status: Alert/Oriented (P)      Current cognitive status: Alert/Oriented (P)      Walking or Climbing Stairs Difficulty none (P)      Dressing/Bathing Difficulty none (P)      Equipment  Currently Used at Home none (P)      Readmission within 30 days? No (P)      Patient currently being followed by outpatient case management? No (P)      Do you currently have service(s) that help you manage your care at home? No (P)      Do you take prescription medications? Yes (P)      Do you have prescription coverage? Yes (P)      Coverage BCBS (P)      Do you have any problems affording any of your prescribed medications? No (P)      Is the patient taking medications as prescribed? yes (P)      Who is going to help you get home at discharge? MISSAEL CELESTIN (Spouse)   [phone] (Mobile) (P)      How do you get to doctors appointments? car, drives self (P)      Are you on dialysis? No (P)      Do you take coumadin? No (P)      Discharge Plan A Home;Home with family (P)      Discharge Plan B Home;Home with family (P)      DME Needed Upon Discharge  none (P)      Discharge Plan discussed with: Patient (P)      Discharge Barriers Identified None (P)         Relationship/Environment    Name(s) of Who Lives With Patient MISSAEL CELESTIN (Spouse)   [phone] (Mobile) (P)

## 2022-06-28 ENCOUNTER — HOSPITAL ENCOUNTER (OUTPATIENT)
Dept: RADIOLOGY | Facility: HOSPITAL | Age: 62
Discharge: HOME | End: 2022-06-28
Attending: INTERNAL MEDICINE
Payer: COMMERCIAL

## 2022-06-28 ENCOUNTER — CLINICAL SUPPORT (OUTPATIENT)
Dept: CARDIOLOGY | Facility: HOSPITAL | Age: 62
End: 2022-06-28
Attending: INTERNAL MEDICINE
Payer: COMMERCIAL

## 2022-06-28 VITALS
HEART RATE: 74 BPM | SYSTOLIC BLOOD PRESSURE: 146 MMHG | DIASTOLIC BLOOD PRESSURE: 84 MMHG | TEMPERATURE: 98 F | HEIGHT: 73 IN | WEIGHT: 202.81 LBS | BODY MASS INDEX: 26.88 KG/M2 | OXYGEN SATURATION: 99 % | RESPIRATION RATE: 20 BRPM

## 2022-06-28 LAB
ALBUMIN SERPL BCP-MCNC: 3.4 G/DL (ref 3.5–5.2)
ALP SERPL-CCNC: 56 U/L (ref 55–135)
ALT SERPL W/O P-5'-P-CCNC: 18 U/L (ref 10–44)
ANION GAP SERPL CALC-SCNC: 6 MMOL/L (ref 8–16)
AST SERPL-CCNC: 17 U/L (ref 10–40)
BASOPHILS # BLD AUTO: 0.05 K/UL (ref 0–0.2)
BASOPHILS NFR BLD: 0.8 % (ref 0–1.9)
BILIRUB SERPL-MCNC: 1 MG/DL (ref 0.1–1)
BUN SERPL-MCNC: 11 MG/DL (ref 8–23)
CALCIUM SERPL-MCNC: 9 MG/DL (ref 8.7–10.5)
CHLORIDE SERPL-SCNC: 105 MMOL/L (ref 95–110)
CO2 SERPL-SCNC: 29 MMOL/L (ref 23–29)
CREAT SERPL-MCNC: 1 MG/DL (ref 0.5–1.4)
CV PHARM DOSE: 0.4 MG
CV STRESS BASE HR: 63 BPM
DIASTOLIC BLOOD PRESSURE: 78 MMHG
DIFFERENTIAL METHOD: ABNORMAL
EOSINOPHIL # BLD AUTO: 0.3 K/UL (ref 0–0.5)
EOSINOPHIL NFR BLD: 4.7 % (ref 0–8)
ERYTHROCYTE [DISTWIDTH] IN BLOOD BY AUTOMATED COUNT: 12.2 % (ref 11.5–14.5)
EST. GFR  (AFRICAN AMERICAN): >60 ML/MIN/1.73 M^2
EST. GFR  (NON AFRICAN AMERICAN): >60 ML/MIN/1.73 M^2
GLUCOSE SERPL-MCNC: 95 MG/DL (ref 70–110)
HCT VFR BLD AUTO: 42.3 % (ref 40–54)
HGB BLD-MCNC: 14.7 G/DL (ref 14–18)
IMM GRANULOCYTES # BLD AUTO: 0.02 K/UL (ref 0–0.04)
IMM GRANULOCYTES NFR BLD AUTO: 0.3 % (ref 0–0.5)
LYMPHOCYTES # BLD AUTO: 2.4 K/UL (ref 1–4.8)
LYMPHOCYTES NFR BLD: 36 % (ref 18–48)
MCH RBC QN AUTO: 31.6 PG (ref 27–31)
MCHC RBC AUTO-ENTMCNC: 34.8 G/DL (ref 32–36)
MCV RBC AUTO: 91 FL (ref 82–98)
MONOCYTES # BLD AUTO: 0.6 K/UL (ref 0.3–1)
MONOCYTES NFR BLD: 9.7 % (ref 4–15)
NEUTROPHILS # BLD AUTO: 3.2 K/UL (ref 1.8–7.7)
NEUTROPHILS NFR BLD: 48.5 % (ref 38–73)
NRBC BLD-RTO: 0 /100 WBC
OHS CV CPX 1 MINUTE RECOVERY HEART RATE: 113 BPM
OHS CV CPX 85 PERCENT MAX PREDICTED HEART RATE MALE: 135
OHS CV CPX MAX PREDICTED HEART RATE: 159
OHS CV CPX PATIENT IS FEMALE: 0
OHS CV CPX PATIENT IS MALE: 1
OHS CV CPX PEAK DIASTOLIC BLOOD PRESSURE: 79 MMHG
OHS CV CPX PEAK HEAR RATE: 113 BPM
OHS CV CPX PEAK RATE PRESSURE PRODUCT: NORMAL
OHS CV CPX PEAK SYSTOLIC BLOOD PRESSURE: 160 MMHG
OHS CV CPX PERCENT MAX PREDICTED HEART RATE ACHIEVED: 71
OHS CV CPX RATE PRESSURE PRODUCT PRESENTING: 8505
PLATELET # BLD AUTO: 209 K/UL (ref 150–450)
PMV BLD AUTO: 9.7 FL (ref 9.2–12.9)
POTASSIUM SERPL-SCNC: 3.8 MMOL/L (ref 3.5–5.1)
PROT SERPL-MCNC: 5.9 G/DL (ref 6–8.4)
RBC # BLD AUTO: 4.65 M/UL (ref 4.6–6.2)
SODIUM SERPL-SCNC: 140 MMOL/L (ref 136–145)
SYSTOLIC BLOOD PRESSURE: 135 MMHG
WBC # BLD AUTO: 6.61 K/UL (ref 3.9–12.7)

## 2022-06-28 PROCEDURE — 93018 NUCLEAR STRESS TEST (CUPID ONLY): ICD-10-PCS | Mod: ,,, | Performed by: INTERNAL MEDICINE

## 2022-06-28 PROCEDURE — 25000003 PHARM REV CODE 250: Performed by: INTERNAL MEDICINE

## 2022-06-28 PROCEDURE — A9502 TC99M TETROFOSMIN: HCPCS

## 2022-06-28 PROCEDURE — 96376 TX/PRO/DX INJ SAME DRUG ADON: CPT | Mod: 59

## 2022-06-28 PROCEDURE — G0378 HOSPITAL OBSERVATION PER HR: HCPCS

## 2022-06-28 PROCEDURE — 93016 NUCLEAR STRESS TEST (CUPID ONLY): ICD-10-PCS | Mod: ,,, | Performed by: INTERNAL MEDICINE

## 2022-06-28 PROCEDURE — 36415 COLL VENOUS BLD VENIPUNCTURE: CPT | Performed by: NURSE PRACTITIONER

## 2022-06-28 PROCEDURE — 85025 COMPLETE CBC W/AUTO DIFF WBC: CPT | Performed by: NURSE PRACTITIONER

## 2022-06-28 PROCEDURE — 93017 CV STRESS TEST TRACING ONLY: CPT

## 2022-06-28 PROCEDURE — 80053 COMPREHEN METABOLIC PANEL: CPT | Performed by: NURSE PRACTITIONER

## 2022-06-28 PROCEDURE — 93016 CV STRESS TEST SUPVJ ONLY: CPT | Mod: ,,, | Performed by: INTERNAL MEDICINE

## 2022-06-28 PROCEDURE — 25000003 PHARM REV CODE 250: Performed by: NURSE PRACTITIONER

## 2022-06-28 PROCEDURE — 63600175 PHARM REV CODE 636 W HCPCS: Performed by: INTERNAL MEDICINE

## 2022-06-28 PROCEDURE — 93018 CV STRESS TEST I&R ONLY: CPT | Mod: ,,, | Performed by: INTERNAL MEDICINE

## 2022-06-28 RX ORDER — REGADENOSON 0.08 MG/ML
0.4 INJECTION, SOLUTION INTRAVENOUS
Status: COMPLETED | OUTPATIENT
Start: 2022-06-28 | End: 2022-06-28

## 2022-06-28 RX ORDER — HYDRALAZINE HYDROCHLORIDE 25 MG/1
25 TABLET, FILM COATED ORAL EVERY 12 HOURS
Qty: 90 TABLET | Refills: 0 | Status: SHIPPED | OUTPATIENT
Start: 2022-06-28 | End: 2022-09-26

## 2022-06-28 RX ADMIN — HYDRALAZINE HYDROCHLORIDE 25 MG: 25 TABLET, FILM COATED ORAL at 06:06

## 2022-06-28 RX ADMIN — THERA TABS 1 TABLET: TAB at 11:06

## 2022-06-28 RX ADMIN — REGADENOSON 0.4 MG: 0.08 INJECTION, SOLUTION INTRAVENOUS at 10:06

## 2022-06-28 RX ADMIN — BUPROPION HYDROCHLORIDE 150 MG: 150 TABLET, EXTENDED RELEASE ORAL at 11:06

## 2022-06-28 RX ADMIN — FOLIC ACID 1 MG: 1 TABLET ORAL at 11:06

## 2022-06-28 RX ADMIN — FAMOTIDINE 20 MG: 10 INJECTION INTRAVENOUS at 11:06

## 2022-06-28 RX ADMIN — CITALOPRAM HYDROBROMIDE 20 MG: 20 TABLET ORAL at 11:06

## 2022-06-28 NOTE — PLAN OF CARE
Pt resting. NPO for stress today. No c/o SOB or pain. Side rails up x 2. Bed locked in lowest position. Reece light in reach.

## 2022-06-28 NOTE — PLAN OF CARE
Discharge orders and chart reviewed with no further post-acute discharge needs identified at this time.  At this time, patient is cleared for discharge from Case Management standpoint.     Patient discharged out of hospital before able to schedule post hospital PCP f/u visit prior to patient physically leaving the hospital.       06/28/22 1424   Final Note   Assessment Type Final Discharge Note   Anticipated Discharge Disposition Home   Post-Acute Status   Discharge Delays None known at this time

## 2022-06-28 NOTE — HOSPITAL COURSE
HISTORY OF PRESENT ILLNESS:   Jaen Bates is a 62 y.o. old  male who  has a past medical history of Depression and GERD (gastroesophageal reflux disease).. The patient presented to [facility] on 6/26/2022 with a primary complaint of Abdominal Pain (Pt here for abd pain that started about 30 minutes ago. )  .      62-year-old  male presents to emergency room with atypical chest pain/abdominal pain     The patient states that about 5:00 p.m. today he was sitting and had a sudden onset left anterior or upper chest pain.  This pain felt like of pressure or throbbing sensation and last for few seconds.  Subsequently the pain began in his lower epigastric region.  He describes the pain as a tight sensation with no associated nausea vomiting diarrhea.       He denied hematemesis hemoptysis black or bloody stools lightheadedness dizziness shortness of breath or syncope.     He denies trauma.  He works as a  for [employer]     He rated the pain 8/10 intensity at its worst with no alleviating or exacerbating factors     Past medical history significant for GERD and depression.  He does have a hiatal hernia and has had hernia repairs with mesh     In the emergency room his cardiac enzymes were within normal limits his 12 lead EKG showed no acute ischemic changes a CT of his abdomen did reveal a small spleen attic infarct     The patient will be admitted for cardiac workup and GI will be consulted.  He will be placed on H2 blocker and given IV hydration and pain manage      Hospital course   Patient was admitted with epigastric pain and chest pain .  Cardiac work up was negative and neg trop and negative lexiscan .  He was reviewed by surgeon  because CT abdomen finding with accidental 3.7 cm hypodensity at medial aspect of spleen with small splenic infarct . No surgical intervention recommended  and later appointment give as OP with dr Dow since no more symptoms and DC in stable condition . ECHO  was totally normal and no signs of infection .

## 2022-06-28 NOTE — PROGRESS NOTES
@  10:20 PATIENT INJECTED WITH MYOVIEW IV FOR STRESS IMAGES.    LEXISCAN STRESS        RELEASE NPO STATUS FROM NUCLEAR MEDICINE.

## 2022-06-28 NOTE — CONSULTS
[facility]  General Surgery  Consult Note    Inpatient consult to General Surgery  Consult performed by: Ramin Raymundo III, MD  Consult ordered by: Jessica Engle NP        Subjective:     Chief Complaint/Reason for Admission:  Splenic infarction, epigastric pain    History of Present Illness:  Patient is 62-year-old male with past medical history of GERD, depression.  He presented to the emergency department last night with sudden onset of mid epigastric pain radiating slightly to the left and chest pain.  Pain started earlier in the day and evolved into a constant and severe pain rated around 8/10.  CT scan was performed that showed a 3.7 cm area of medial splenic infarction.  No other abnormality appreciated including no evidence of gastric or bowel wall thickening, no evidence of pancreatitis or diverticulitis.  No evidence of bleeding, fluid collections.  Patient also found to have blood pressure above 200 systolic.  Cardiac workup started today but stress test aborted due to blood pressure around 240 systolic.  Patient's mid abdominal pain resolved fairly soon after admission.  He states he has no abdominal pain.  No nausea.  No fevers or chills.  He reports no history of splenic issues in the past.  CT scan in March 2021 did not show any evidence of splenic infarction.  He has no intra-abdominal operations.  He has history of inguinal hernia repair.  Reports COVID infection in September 2021.     No current facility-administered medications on file prior to encounter.     Current Outpatient Medications on File Prior to Encounter   Medication Sig    buPROPion (WELLBUTRIN SR) 150 MG TBSR 12 hr tablet Take 1 tablet (150 mg total) by mouth 2 (two) times daily.    citalopram (CELEXA) 20 MG tablet Take 1 tablet (20 mg total) by mouth once daily.    omeprazole (PRILOSEC) 20 MG capsule Take 1 capsule (20 mg total) by mouth once daily.    zolpidem (AMBIEN) 5 MG Tab Take 1 tablet (5 mg total) by  mouth every evening.    buPROPion (WELLBUTRIN XL) 150 MG TB24 tablet Take 1 tablet (150 mg total) by mouth once daily.    sildenafiL (VIAGRA) 50 MG tablet Take 1 tablet (50 mg total) by mouth daily as needed for Erectile Dysfunction.       Review of patient's allergies indicates:  No Known Allergies    Past Medical History:   Diagnosis Date    Depression     GERD (gastroesophageal reflux disease)      Past Surgical History:   Procedure Laterality Date    HERNIA REPAIR       Family History     Problem Relation (Age of Onset)    Alzheimer's disease Mother    Heart disease Mother    Hypertension Mother    Kidney disease Brother    No Known Problems Father        Tobacco Use    Smoking status: Never Smoker    Smokeless tobacco: Never Used   Substance and Sexual Activity    Alcohol use: Not Currently     Alcohol/week: 4.0 standard drinks     Types: 4 Cans of beer per week     Comment: Drinks 2-4 beers daily    Drug use: Never    Sexual activity: Yes     Partners: Female     Review of Systems   Constitutional: Negative for appetite change, chills, fever and unexpected weight change.   HENT: Negative for hearing loss, rhinorrhea, sore throat and voice change.    Eyes: Negative for photophobia and visual disturbance.   Respiratory: Negative for cough, choking and shortness of breath.    Cardiovascular: Negative for chest pain, palpitations and leg swelling.   Gastrointestinal: Negative for abdominal pain, blood in stool, constipation, diarrhea, nausea and vomiting.   Endocrine: Negative for cold intolerance, heat intolerance, polydipsia and polyuria.   Musculoskeletal: Negative for arthralgias, back pain, joint swelling and neck stiffness.   Skin: Negative for color change, pallor and rash.   Neurological: Negative for dizziness, seizures, syncope and headaches.   Hematological: Negative for adenopathy. Does not bruise/bleed easily.   Psychiatric/Behavioral: Negative for agitation, behavioral problems and  confusion.     Objective:     Vital Signs (Most Recent):  Temp: 98.4 °F (36.9 °C) (06/27/22 1950)  Pulse: 67 (06/27/22 1950)  Resp: 20 (06/27/22 1950)  BP: (!) 147/89 (06/27/22 1950)  SpO2: 98 % (06/27/22 1950) Vital Signs (24h Range):  Temp:  [97.3 °F (36.3 °C)-98.9 °F (37.2 °C)] 98.4 °F (36.9 °C)  Pulse:  [62-83] 67  Resp:  [17-20] 20  SpO2:  [97 %-99 %] 98 %  BP: (116-165)/(60-90) 147/89     Weight: 93.2 kg (205 lb 7.5 oz)  Body mass index is 27.11 kg/m².      Intake/Output Summary (Last 24 hours) at 6/27/2022 2217  Last data filed at 6/27/2022 1555  Gross per 24 hour   Intake 480 ml   Output --   Net 480 ml       Physical Exam  Vitals reviewed.   Constitutional:       General: He is awake. He is not in acute distress.     Appearance: He is not toxic-appearing.   HENT:      Head: Normocephalic and atraumatic.   Cardiovascular:      Rate and Rhythm: Normal rate.   Pulmonary:      Effort: Pulmonary effort is normal. No tachypnea, bradypnea, accessory muscle usage or respiratory distress.   Abdominal:      General: There is no distension.      Palpations: Abdomen is soft.      Tenderness: There is no abdominal tenderness. There is no guarding or rebound.   Musculoskeletal:      Cervical back: Neck supple.   Neurological:      Mental Status: He is alert and oriented to person, place, and time.   Psychiatric:         Behavior: Behavior is cooperative.         Significant Labs:  CBC:   Recent Labs   Lab 06/27/22 0425   WBC 8.64   RBC 4.69   HGB 14.8   HCT 43.1      MCV 92   MCH 31.6*   MCHC 34.3     CMP:   Recent Labs   Lab 06/27/22 0425   GLU 91   CALCIUM 8.7   ALBUMIN 3.4*   PROT 5.6*      K 4.1   CO2 24      BUN 12   CREATININE 1.0   ALKPHOS 53*   ALT 20   AST 20   BILITOT 1.0       Significant Diagnostics:  I have reviewed all pertinent imaging results/findings within the past 24 hours.    Assessment/Plan:     Active Diagnoses:    Diagnosis Date Noted POA    PRINCIPAL PROBLEM:  Atypical chest  pain [R07.89] 06/26/2022 Yes    Hx of hiatal hernia [Z87.19] 06/26/2022 Not Applicable    Splenic infarct [D73.5] 06/26/2022 Yes    Uncomplicated alcohol dependence [F10.20] 06/26/2022 Yes      Problems Resolved During this Admission:     -pain resolved.  Physical exam benign.  no indication for splenectomy tonight.  Will continue to monitor.  Etiology of infarct unknown.  Will discuss case with attending and consulting physicians.  Thank you for your consult.     Ramin Raymundo III, MD  General Surgery  [facility]

## 2022-06-28 NOTE — DISCHARGE SUMMARY
[facility] Medicine  Discharge Summary      Patient Name: Jean Bates  MRN: [id number]  Patient Class: OP- Observation  Admission Date: 6/26/2022  Hospital Length of Stay: 0 days  Discharge Date and Time:  06/28/2022 5:22 PM  Attending Physician: No att. providers found   Discharging Provider: Ced Kevin MD  Primary Care Provider: Diann Arriaga NP      HPI:   No notes on file    * No surgery found *      Hospital Course:   HISTORY OF PRESENT ILLNESS:   Jean Bates is a 62 y.o. old  male who  has a past medical history of Depression and GERD (gastroesophageal reflux disease).. The patient presented to [facility] on 6/26/2022 with a primary complaint of Abdominal Pain (Pt here for abd pain that started about 30 minutes ago. )  .      62-year-old  male presents to emergency room with atypical chest pain/abdominal pain     The patient states that about 5:00 p.m. today he was sitting and had a sudden onset left anterior or upper chest pain.  This pain felt like of pressure or throbbing sensation and last for few seconds.  Subsequently the pain began in his lower epigastric region.  He describes the pain as a tight sensation with no associated nausea vomiting diarrhea.       He denied hematemesis hemoptysis black or bloody stools lightheadedness dizziness shortness of breath or syncope.     He denies trauma.  He works as a  for [employer]     He rated the pain 8/10 intensity at its worst with no alleviating or exacerbating factors     Past medical history significant for GERD and depression.  He does have a hiatal hernia and has had hernia repairs with mesh     In the emergency room his cardiac enzymes were within normal limits his 12 lead EKG showed no acute ischemic changes a CT of his abdomen did reveal a small spleen attic infarct     The patient will be admitted for cardiac workup and GI will be consulted.  He will be placed on H2 blocker and given IV hydration  and pain manage      Hospital course   Patient was admitted with epigastric pain and chest pain .  Cardiac work up was negative and neg trop and negative lexiscan .  He was reviewed by surgeon  because CT abdomen finding with accidental 3.7 cm hypodensity at medial aspect of spleen with small splenic infarct . No surgical intervention recommended  and later appointment give as OP with dr Dow since no more symptoms and DC in stable condition . ECHO was totally normal and no signs of infection .  His BP is pretty elevated at admit not sure secondary to pain and later prescribed hydralazine BID. Discussed with dr lima and will follow up .He suspect patient might have had small vessel of spleen dissection.           Goals of Care Treatment Preferences:  Code Status: Full Code      Consults:   Consults (From admission, onward)        Status Ordering Provider     Inpatient consult to General Surgery  Once        Provider:  Gabriel Gifford MD    Completed LC BEAVERS          No new Assessment & Plan notes have been filed under this hospital service since the last note was generated.  Service: Hospital Medicine    Final Active Diagnoses:    Diagnosis Date Noted POA    PRINCIPAL PROBLEM:  Atypical chest pain [R07.89] 06/26/2022 Yes    Hx of hiatal hernia [Z87.19] 06/26/2022 Not Applicable    Splenic infarct [D73.5] 06/26/2022 Yes    Uncomplicated alcohol dependence [F10.20] 06/26/2022 Yes      Problems Resolved During this Admission:       Discharged Condition: good    Disposition: Home or Self Care    Follow Up:   Follow-up Information     Ramin Lima III, MD Follow up in 1 month(s).    Specialties: General Surgery, Surgery  Contact information:  [address]  [address]  [place] [address]  [phone]             Ted Bonner MD Follow up in 1 month(s).    Specialty: Cardiology  Contact information:  [address]  [address]  [facility]  [place] [address]  [phone]                        Patient Instructions:      Diet Cardiac     Activity as tolerated       Significant Diagnostic Studies: Labs:   CMP   Recent Labs   Lab 06/27/22  0425 06/28/22 0422    140   K 4.1 3.8    105   CO2 24 29   GLU 91 95   BUN 12 11   CREATININE 1.0 1.0   CALCIUM 8.7 9.0   PROT 5.6* 5.9*   ALBUMIN 3.4* 3.4*   BILITOT 1.0 1.0   ALKPHOS 53* 56   AST 20 17   ALT 20 18   ANIONGAP 8 6*   ESTGFRAFRICA >60.0 >60.0   EGFRNONAA >60.0 >60.0    and CBC   Recent Labs   Lab 06/27/22 0425 06/28/22 0422   WBC 8.64 6.61   HGB 14.8 14.7   HCT 43.1 42.3    209       Pending Diagnostic Studies:     None         Medications:  Reconciled Home Medications:      Medication List      START taking these medications    hydrALAZINE 25 MG tablet  Commonly known as: APRESOLINE  Take 1 tablet (25 mg total) by mouth every 12 (twelve) hours.        CHANGE how you take these medications    buPROPion 150 MG TBSR 12 hr tablet  Commonly known as: WELLBUTRIN SR  Take 1 tablet (150 mg total) by mouth 2 (two) times daily.  What changed: Another medication with the same name was removed. Continue taking this medication, and follow the directions you see here.        CONTINUE taking these medications    citalopram 20 MG tablet  Commonly known as: CeleXA  Take 1 tablet (20 mg total) by mouth once daily.     omeprazole 20 MG capsule  Commonly known as: PRILOSEC  Take 1 capsule (20 mg total) by mouth once daily.     sildenafiL 50 MG tablet  Commonly known as: VIAGRA  Take 1 tablet (50 mg total) by mouth daily as needed for Erectile Dysfunction.     zolpidem 5 MG Tab  Commonly known as: AMBIEN  Take 1 tablet (5 mg total) by mouth every evening.            Indwelling Lines/Drains at time of discharge:   Lines/Drains/Airways     None             General: Patient resting comfortably in no acute distress. Appears as stated age. Calm  Eyes: EOM intact. No conjunctivae injection. No scleral icterus.  ENT: Hearing grossly intact. No discharge  from ears. No nasal discharge.   CVS: RRR. No LE edema BL.  Lungs: CTA BL, no wheezing or crackles. Good breath sounds. No accessory muscle use. No acute respiratory distress  Neuro: non focal , Follows commands. Responds appropriately    Time spent on the discharge of patient: 222 minutes         Ced Kevin MD  Department of Hospital Medicine  [facility]

## 2022-07-08 ENCOUNTER — TELEPHONE (OUTPATIENT)
Dept: FAMILY MEDICINE | Facility: CLINIC | Age: 62
End: 2022-07-08

## 2022-07-08 NOTE — TELEPHONE ENCOUNTER
----- Message from Dara Chadwick sent at 7/8/2022 10:25 AM CDT -----  Pt was discharged from [facility] 06/28 and needs [facility].   [phone]

## 2022-07-20 ENCOUNTER — TELEPHONE (OUTPATIENT)
Dept: FAMILY MEDICINE | Facility: CLINIC | Age: 62
End: 2022-07-20

## 2022-07-20 ENCOUNTER — OFFICE VISIT (OUTPATIENT)
Dept: FAMILY MEDICINE | Facility: CLINIC | Age: 62
End: 2022-07-20
Payer: COMMERCIAL

## 2022-07-20 VITALS
BODY MASS INDEX: 26.77 KG/M2 | SYSTOLIC BLOOD PRESSURE: 120 MMHG | HEART RATE: 88 BPM | WEIGHT: 202 LBS | TEMPERATURE: 98 F | HEIGHT: 73 IN | DIASTOLIC BLOOD PRESSURE: 68 MMHG

## 2022-07-20 DIAGNOSIS — D50.8 IRON DEFICIENCY ANEMIA SECONDARY TO INADEQUATE DIETARY IRON INTAKE: ICD-10-CM

## 2022-07-20 DIAGNOSIS — F33.42 RECURRENT MAJOR DEPRESSIVE DISORDER, IN FULL REMISSION: ICD-10-CM

## 2022-07-20 DIAGNOSIS — I10 ESSENTIAL HYPERTENSION: ICD-10-CM

## 2022-07-20 DIAGNOSIS — Z09 HOSPITAL DISCHARGE FOLLOW-UP: Primary | ICD-10-CM

## 2022-07-20 DIAGNOSIS — F51.01 PRIMARY INSOMNIA: ICD-10-CM

## 2022-07-20 DIAGNOSIS — D73.5 SPLENIC INFARCT: ICD-10-CM

## 2022-07-20 DIAGNOSIS — K21.9 GASTROESOPHAGEAL REFLUX DISEASE, UNSPECIFIED WHETHER ESOPHAGITIS PRESENT: ICD-10-CM

## 2022-07-20 PROCEDURE — 3078F PR MOST RECENT DIASTOLIC BLOOD PRESSURE < 80 MM HG: ICD-10-PCS | Mod: CPTII,S$GLB,, | Performed by: PHYSICIAN ASSISTANT

## 2022-07-20 PROCEDURE — 4010F PR ACE/ARB THEARPY RXD/TAKEN: ICD-10-PCS | Mod: CPTII,S$GLB,, | Performed by: PHYSICIAN ASSISTANT

## 2022-07-20 PROCEDURE — 3008F PR BODY MASS INDEX (BMI) DOCUMENTED: ICD-10-PCS | Mod: CPTII,S$GLB,, | Performed by: PHYSICIAN ASSISTANT

## 2022-07-20 PROCEDURE — 3074F SYST BP LT 130 MM HG: CPT | Mod: CPTII,S$GLB,, | Performed by: PHYSICIAN ASSISTANT

## 2022-07-20 PROCEDURE — 1160F PR REVIEW ALL MEDS BY PRESCRIBER/CLIN PHARMACIST DOCUMENTED: ICD-10-PCS | Mod: CPTII,S$GLB,, | Performed by: PHYSICIAN ASSISTANT

## 2022-07-20 PROCEDURE — 3074F PR MOST RECENT SYSTOLIC BLOOD PRESSURE < 130 MM HG: ICD-10-PCS | Mod: CPTII,S$GLB,, | Performed by: PHYSICIAN ASSISTANT

## 2022-07-20 PROCEDURE — 3008F BODY MASS INDEX DOCD: CPT | Mod: CPTII,S$GLB,, | Performed by: PHYSICIAN ASSISTANT

## 2022-07-20 PROCEDURE — 1159F MED LIST DOCD IN RCRD: CPT | Mod: CPTII,S$GLB,, | Performed by: PHYSICIAN ASSISTANT

## 2022-07-20 PROCEDURE — 99214 OFFICE O/P EST MOD 30 MIN: CPT | Mod: S$GLB,,, | Performed by: PHYSICIAN ASSISTANT

## 2022-07-20 PROCEDURE — 1160F RVW MEDS BY RX/DR IN RCRD: CPT | Mod: CPTII,S$GLB,, | Performed by: PHYSICIAN ASSISTANT

## 2022-07-20 PROCEDURE — 99214 PR OFFICE/OUTPT VISIT, EST, LEVL IV, 30-39 MIN: ICD-10-PCS | Mod: S$GLB,,, | Performed by: PHYSICIAN ASSISTANT

## 2022-07-20 PROCEDURE — 1159F PR MEDICATION LIST DOCUMENTED IN MEDICAL RECORD: ICD-10-PCS | Mod: CPTII,S$GLB,, | Performed by: PHYSICIAN ASSISTANT

## 2022-07-20 PROCEDURE — 4010F ACE/ARB THERAPY RXD/TAKEN: CPT | Mod: CPTII,S$GLB,, | Performed by: PHYSICIAN ASSISTANT

## 2022-07-20 PROCEDURE — 3078F DIAST BP <80 MM HG: CPT | Mod: CPTII,S$GLB,, | Performed by: PHYSICIAN ASSISTANT

## 2022-07-20 RX ORDER — VALSARTAN 160 MG/1
160 TABLET ORAL DAILY
COMMUNITY
End: 2023-03-13 | Stop reason: SDUPTHER

## 2022-07-20 NOTE — LETTER
[address]  [place] [address]  Phone: [phone]   [phone]                        MD Татьяна Boo MD Chequita Williams, MD Matthew Bassett, ALESSIA Mckee, DARLIN Posadas, DARLIN Arriaga, DARLIN Hdez PA-C      Date: 07/20/2022        Patient: Jean Bates  YOB: 1960    Please fax over pt's most recent office note, labs and any imaging/procedure.          Sincerely,     Kati Tolentino MA

## 2022-07-20 NOTE — PROGRESS NOTES
Patient ID: Jean Bates is a 62 y.o. male.    Chief Complaint: Follow-up (Brought bottles // discuss about medication // abc)    Admit Date: 6/26/22   Discharge Date: 6/28/22  Discharge Facility: Hospital    Medication Reconciliation:  Medications changed/added/deleted.  Hydralazine 25mg b.i.d.  New Prescriptions filled after discharge: yes  Discharge summary reviewed:  yes  Pending test results at discharge reviewed:   not applicable  Follow up appointments scheduled:  yes   with Cardiology  - Dr. Bonner   With General Surgery - Dr. Dow  Follow up labs/tests ordered:   not applicable  Home Health ordered on discharge:   no  Home Health company name: N/A  DME ordered at discharge:   no    History of hospital stay:  Hospital course   Patient was admitted with epigastric pain and chest pain .  Cardiac work up was negative and neg trop and negative lexiscan .  He was reviewed by surgeon  because CT abdomen finding with accidental 3.7 cm hypodensity at medial aspect of spleen with small splenic infarct . No surgical intervention recommended  and later appointment give as OP with dr Dow since no more symptoms and DC in stable condition . ECHO was totally normal and no signs of infection .  His BP is pretty elevated at admit not sure secondary to pain and later prescribed hydralazine BID. Discussed with dr lima and will follow up .He suspect patient might have had small vessel of spleen dissection.    How patient is feeling since discharge from the hospital?  Pt is a 62 y.o. male who presents today for a hospital follow-up.  Since discharge, he reports feeling well. He occasionally feels lightheaded when standing, but it is transient and only last 30 seconds -1 minute.  He followed up with Dr. Bonner (Cardiology) last week.  He reports his Hydralazine was discontinued and he was started on Valsartan 160mg q.d. Also, Dr. Bonner ordered lab work - results pending. BP at home has been averaging in the 130's/80's  mmHg, and is stable and well controlled in office today.  He denies any CP, palpitation, dizziness, or syncopal episodes. Regarding his splenic findings on CT, he denies any abdominal pain, abdominal distention, or N/V. He is scheduled to follow up with Dr. Atkins (General Surgery) in 2 weeks.    Patient follow up phone call documented on separate encounter.    No results displayed because visit has over 200 results.      Office Visit on 02/16/2022   Component Date Value Ref Range Status    Ferritin 02/17/2022 47  24 - 380 ng/mL Final    Iron 02/17/2022 199 (A) 50 - 180 mcg/dL Final    TIBC 02/17/2022 486 (A) 250 - 425 mcg/dL (calc) Final    Iron Saturation 02/17/2022 41  20 - 48 % (calc) Final       Past Medical History:   Diagnosis Date    Depression     GERD (gastroesophageal reflux disease)      Past Surgical History:   Procedure Laterality Date    HERNIA REPAIR       Family History   Problem Relation Age of Onset    Hypertension Mother     Heart disease Mother     Alzheimer's disease Mother     No Known Problems Father     Kidney disease Brother         Renal stones       Marital Status:   Alcohol History:  reports previous alcohol use of about 4.0 standard drinks of alcohol per week.  Tobacco History:  reports that he has never smoked. He has never used smokeless tobacco.  Drug History:  reports no history of drug use.    Review of patient's allergies indicates:  No Known Allergies    Current Outpatient Medications:     buPROPion (WELLBUTRIN SR) 150 MG TBSR 12 hr tablet, Take 1 tablet (150 mg total) by mouth 2 (two) times daily., Disp: 60 tablet, Rfl: 11    citalopram (CELEXA) 20 MG tablet, Take 1 tablet (20 mg total) by mouth once daily., Disp: 90 tablet, Rfl: 0    omeprazole (PRILOSEC) 20 MG capsule, Take 1 capsule (20 mg total) by mouth once daily., Disp: 180 capsule, Rfl: 1    sildenafiL (VIAGRA) 50 MG tablet, Take 1 tablet (50 mg total) by mouth daily as needed for Erectile  "Dysfunction., Disp: 15 tablet, Rfl: 1    valsartan (DIOVAN) 160 MG tablet, Take 160 mg by mouth once daily., Disp: , Rfl:     zolpidem (AMBIEN) 5 MG Tab, Take 1 tablet (5 mg total) by mouth every evening., Disp: 90 tablet, Rfl: 0    hydrALAZINE (APRESOLINE) 25 MG tablet, Take 1 tablet (25 mg total) by mouth every 12 (twelve) hours. (Patient not taking: Reported on 7/20/2022), Disp: 90 tablet, Rfl: 0    Review of Systems   Constitutional: Negative for activity change, chills, fatigue and fever.   Respiratory: Negative for cough, shortness of breath and wheezing.    Cardiovascular: Negative for chest pain, palpitations and leg swelling.   Gastrointestinal: Negative for abdominal pain, constipation, diarrhea, nausea and vomiting.   Genitourinary: Negative for dysuria, frequency and hematuria.   Musculoskeletal: Negative for arthralgias and myalgias.   Neurological: Positive for light-headedness ("Occasionally when standing."). Negative for dizziness, seizures, syncope, weakness and headaches.   Psychiatric/Behavioral: Negative for behavioral problems.        Objective:      Vitals:    07/20/22 1029   BP: 120/68   Pulse: 88   Temp: 98 °F (36.7 °C)   Weight: 91.6 kg (202 lb)   Height: 6' 1" (1.854 m)     Physical Exam  Vitals and nursing note reviewed.   Constitutional:       General: He is not in acute distress.     Appearance: Normal appearance. He is normal weight. He is not ill-appearing, toxic-appearing or diaphoretic.      Comments: WM sitting erect in an office chair in no acute distress.   HENT:      Head: Normocephalic and atraumatic.      Right Ear: External ear normal.      Left Ear: External ear normal.      Nose: Nose normal. No rhinorrhea.      Mouth/Throat:      Mouth: Mucous membranes are moist.      Pharynx: Oropharynx is clear.   Eyes:      General: No scleral icterus.     Extraocular Movements: Extraocular movements intact.      Conjunctiva/sclera: Conjunctivae normal.   Neck:      Vascular: No " carotid bruit.   Cardiovascular:      Rate and Rhythm: Normal rate and regular rhythm.      Pulses: Normal pulses.      Heart sounds: Normal heart sounds. No murmur heard.    No friction rub. No gallop.      Comments: No JVD noted on inspection.   Pulmonary:      Effort: Pulmonary effort is normal. No respiratory distress.      Breath sounds: Normal breath sounds. No wheezing, rhonchi or rales.   Abdominal:      General: There is no distension.      Palpations: Abdomen is soft. There is no mass.      Tenderness: There is no abdominal tenderness. There is no guarding or rebound.   Musculoskeletal:         General: Normal range of motion.      Right lower leg: No edema.      Left lower leg: No edema.   Skin:     General: Skin is warm and dry.      Findings: No rash.   Neurological:      General: No focal deficit present.      Mental Status: He is alert. Mental status is at baseline.      Gait: Gait normal.   Psychiatric:         Mood and Affect: Mood normal.         Behavior: Behavior normal. Behavior is cooperative.         Assessment:       1. Hospital discharge follow-up    2. Essential hypertension    3. Splenic infarct    4. Primary insomnia    5. Recurrent major depressive disorder, in full remission    6. Iron deficiency anemia secondary to inadequate dietary iron intake    7. Gastroesophageal reflux disease, unspecified whether esophagitis present         Plan:       Hospital discharge follow-up  Hospital discharge summary reviewed.  Medical records from Dr. Bonner (Cardiology) were not made available today in office, but will be requested.    Essential hypertension  BP is currently stable and well controlled.  Continue following-up with Dr. Bonner (Cardiology) as scheduled.  Continue Valsartan 160mg q.d.. - no refills requested today.  Begin following low-sodium.    Splenic infarct  Continue follow-up Dr. Dow (General Surgery) as scheduled.     Primary insomnia    Recurrent major depressive disorder, in full  remission    Iron deficiency anemia secondary to inadequate dietary iron intake    Gastroesophageal reflux disease, unspecified whether esophagitis present      Follow up in about 2 months (around 9/20/2022) for Annual Wellness.

## 2022-07-20 NOTE — TELEPHONE ENCOUNTER
----- Message from JODI De Leon sent at 7/20/2022 10:57 AM CDT -----  Please request medical records from Dr. Bonner (cardiology)

## 2022-08-09 ENCOUNTER — OFFICE VISIT (OUTPATIENT)
Dept: SURGERY | Facility: CLINIC | Age: 62
End: 2022-08-09
Payer: COMMERCIAL

## 2022-08-09 VITALS
SYSTOLIC BLOOD PRESSURE: 133 MMHG | RESPIRATION RATE: 17 BRPM | DIASTOLIC BLOOD PRESSURE: 85 MMHG | HEART RATE: 76 BPM | TEMPERATURE: 98 F

## 2022-08-09 DIAGNOSIS — D73.5 SPLENIC INFARCTION: Primary | ICD-10-CM

## 2022-08-09 PROCEDURE — 1159F MED LIST DOCD IN RCRD: CPT | Mod: CPTII,S$GLB,, | Performed by: SURGERY

## 2022-08-09 PROCEDURE — 3075F SYST BP GE 130 - 139MM HG: CPT | Mod: CPTII,S$GLB,, | Performed by: SURGERY

## 2022-08-09 PROCEDURE — 1160F RVW MEDS BY RX/DR IN RCRD: CPT | Mod: CPTII,S$GLB,, | Performed by: SURGERY

## 2022-08-09 PROCEDURE — 1159F PR MEDICATION LIST DOCUMENTED IN MEDICAL RECORD: ICD-10-PCS | Mod: CPTII,S$GLB,, | Performed by: SURGERY

## 2022-08-09 PROCEDURE — 4010F PR ACE/ARB THEARPY RXD/TAKEN: ICD-10-PCS | Mod: CPTII,S$GLB,, | Performed by: SURGERY

## 2022-08-09 PROCEDURE — 3079F PR MOST RECENT DIASTOLIC BLOOD PRESSURE 80-89 MM HG: ICD-10-PCS | Mod: CPTII,S$GLB,, | Performed by: SURGERY

## 2022-08-09 PROCEDURE — 99213 OFFICE O/P EST LOW 20 MIN: CPT | Mod: S$GLB,,, | Performed by: SURGERY

## 2022-08-09 PROCEDURE — 4010F ACE/ARB THERAPY RXD/TAKEN: CPT | Mod: CPTII,S$GLB,, | Performed by: SURGERY

## 2022-08-09 PROCEDURE — 99213 PR OFFICE/OUTPT VISIT, EST, LEVL III, 20-29 MIN: ICD-10-PCS | Mod: S$GLB,,, | Performed by: SURGERY

## 2022-08-09 PROCEDURE — 3079F DIAST BP 80-89 MM HG: CPT | Mod: CPTII,S$GLB,, | Performed by: SURGERY

## 2022-08-09 PROCEDURE — 3075F PR MOST RECENT SYSTOLIC BLOOD PRESS GE 130-139MM HG: ICD-10-PCS | Mod: CPTII,S$GLB,, | Performed by: SURGERY

## 2022-08-09 PROCEDURE — 1160F PR REVIEW ALL MEDS BY PRESCRIBER/CLIN PHARMACIST DOCUMENTED: ICD-10-PCS | Mod: CPTII,S$GLB,, | Performed by: SURGERY

## 2022-08-10 NOTE — PROGRESS NOTES
Subjective:       Patient ID: Jean Bates is a 62 y.o. male.    Chief Complaint: hospital follow up. Splenic infarct    HPI:  62 year old male seen in the hospital several weeks ago for a splenic infarct. He presented with left upper quadrant pain. Found to have 3.7 cm area of infarct within the spleen. No obvious etiology. His symptoms resolved soon after admission. He has felt well since discharge. No symptoms. He has no previous intraabdominal surgery.     Past Medical History:   Diagnosis Date    Depression     GERD (gastroesophageal reflux disease)      Past Surgical History:   Procedure Laterality Date    HERNIA REPAIR       Review of patient's allergies indicates:  No Known Allergies  Medication List with Changes/Refills   Current Medications    BUPROPION (WELLBUTRIN SR) 150 MG TBSR 12 HR TABLET    Take 1 tablet (150 mg total) by mouth 2 (two) times daily.    CITALOPRAM (CELEXA) 20 MG TABLET    Take 1 tablet (20 mg total) by mouth once daily.    HYDRALAZINE (APRESOLINE) 25 MG TABLET    Take 1 tablet (25 mg total) by mouth every 12 (twelve) hours.    OMEPRAZOLE (PRILOSEC) 20 MG CAPSULE    Take 1 capsule (20 mg total) by mouth once daily.    SILDENAFIL (VIAGRA) 50 MG TABLET    Take 1 tablet (50 mg total) by mouth daily as needed for Erectile Dysfunction.    VALSARTAN (DIOVAN) 160 MG TABLET    Take 160 mg by mouth once daily.    ZOLPIDEM (AMBIEN) 5 MG TAB    Take 1 tablet (5 mg total) by mouth every evening.     Family History   Problem Relation Age of Onset    Hypertension Mother     Heart disease Mother     Alzheimer's disease Mother     No Known Problems Father     Kidney disease Brother         Renal stones     Social History     Socioeconomic History    Marital status:    Tobacco Use    Smoking status: Never Smoker    Smokeless tobacco: Never Used   Substance and Sexual Activity    Alcohol use: Not Currently     Alcohol/week: 4.0 standard drinks     Types: 4 Cans of beer per week      Comment: Drinks 2-4 beers daily    Drug use: Never    Sexual activity: Yes     Partners: Female         Review of Systems    Objective:      Physical Exam  Constitutional:       General: He is not in acute distress.     Appearance: Normal appearance. He is well-developed. He is not toxic-appearing.   HENT:      Head: Normocephalic and atraumatic. No abrasion or laceration.      Right Ear: External ear normal.      Left Ear: External ear normal.      Nose: Nose normal.   Eyes:      Pupils: Pupils are equal, round, and reactive to light.   Neck:      Trachea: Trachea normal. No tracheal deviation.   Cardiovascular:      Rate and Rhythm: Normal rate and regular rhythm.   Pulmonary:      Effort: Pulmonary effort is normal. No tachypnea, accessory muscle usage or respiratory distress.   Abdominal:      General: There is no distension.      Palpations: Abdomen is soft. There is no mass.      Tenderness: There is no abdominal tenderness. There is no guarding or rebound.      Hernia: No hernia is present.   Musculoskeletal:      Cervical back: Neck supple. Normal range of motion.   Lymphadenopathy:      Cervical: No cervical adenopathy.   Skin:     General: Skin is warm.   Neurological:      Mental Status: He is alert and oriented to person, place, and time.      Coordination: Coordination normal.      Gait: Gait normal.   Psychiatric:         Speech: Speech normal.         Behavior: Behavior normal.         Assessment/Plan:   Splenic infarction  -     CT Abdomen Pelvis With Contrast; Future; Expected date: 08/09/2022  -     Creatinine, serum; Future; Expected date: 08/09/2022    asymptomatic. Will order CT to reevaluate. Follow up after CT       I discussed the proposed procedures with the patient including risks, benefits, indications, alternatives and special concerns.  The patient appears to understand and agrees to go ahead with surgery.  I have made no promises, warranties or verbal agreements beyond what was  discussed above.    No follow-ups on file.

## 2022-08-12 ENCOUNTER — LAB VISIT (OUTPATIENT)
Dept: LAB | Facility: HOSPITAL | Age: 62
End: 2022-08-12
Attending: SURGERY
Payer: COMMERCIAL

## 2022-08-12 ENCOUNTER — HOSPITAL ENCOUNTER (OUTPATIENT)
Dept: RADIOLOGY | Facility: HOSPITAL | Age: 62
Discharge: HOME | End: 2022-08-12
Attending: SURGERY
Payer: COMMERCIAL

## 2022-08-12 DIAGNOSIS — D73.5 SPLENIC INFARCTION: ICD-10-CM

## 2022-08-12 LAB
CREAT SERPL-MCNC: 1.1 MG/DL (ref 0.5–1.4)
EST. GFR  (NO RACE VARIABLE): >60 ML/MIN/1.73 M^2

## 2022-08-12 PROCEDURE — 36415 COLL VENOUS BLD VENIPUNCTURE: CPT | Performed by: SURGERY

## 2022-08-12 PROCEDURE — 74177 CT ABD & PELVIS W/CONTRAST: CPT | Mod: TC

## 2022-08-12 PROCEDURE — 82565 ASSAY OF CREATININE: CPT | Performed by: SURGERY

## 2022-08-12 PROCEDURE — 25500020 PHARM REV CODE 255: Performed by: SURGERY

## 2022-08-12 RX ADMIN — IOHEXOL 100 ML: 350 INJECTION, SOLUTION INTRAVENOUS at 09:08

## 2022-08-30 ENCOUNTER — PATIENT MESSAGE (OUTPATIENT)
Dept: FAMILY MEDICINE | Facility: CLINIC | Age: 62
End: 2022-08-30

## 2022-08-30 DIAGNOSIS — F51.01 PRIMARY INSOMNIA: ICD-10-CM

## 2022-08-30 RX ORDER — ZOLPIDEM TARTRATE 5 MG/1
5 TABLET ORAL NIGHTLY
Qty: 30 TABLET | Refills: 0 | Status: SHIPPED | OUTPATIENT
Start: 2022-08-30 | End: 2022-09-26 | Stop reason: SDUPTHER

## 2022-09-26 ENCOUNTER — OFFICE VISIT (OUTPATIENT)
Dept: FAMILY MEDICINE | Facility: CLINIC | Age: 62
End: 2022-09-26
Payer: COMMERCIAL

## 2022-09-26 VITALS
HEART RATE: 67 BPM | OXYGEN SATURATION: 98 % | DIASTOLIC BLOOD PRESSURE: 72 MMHG | BODY MASS INDEX: 27.04 KG/M2 | WEIGHT: 204 LBS | HEIGHT: 73 IN | TEMPERATURE: 99 F | SYSTOLIC BLOOD PRESSURE: 118 MMHG

## 2022-09-26 DIAGNOSIS — D50.8 IRON DEFICIENCY ANEMIA SECONDARY TO INADEQUATE DIETARY IRON INTAKE: ICD-10-CM

## 2022-09-26 DIAGNOSIS — F33.42 RECURRENT MAJOR DEPRESSIVE DISORDER, IN FULL REMISSION: ICD-10-CM

## 2022-09-26 DIAGNOSIS — Z12.5 PROSTATE CANCER SCREENING: ICD-10-CM

## 2022-09-26 DIAGNOSIS — I10 ESSENTIAL HYPERTENSION: Primary | ICD-10-CM

## 2022-09-26 DIAGNOSIS — K21.9 GASTROESOPHAGEAL REFLUX DISEASE, UNSPECIFIED WHETHER ESOPHAGITIS PRESENT: ICD-10-CM

## 2022-09-26 DIAGNOSIS — F51.01 PRIMARY INSOMNIA: ICD-10-CM

## 2022-09-26 DIAGNOSIS — Z23 NEED FOR INFLUENZA VACCINATION: ICD-10-CM

## 2022-09-26 PROCEDURE — 99214 PR OFFICE/OUTPT VISIT, EST, LEVL IV, 30-39 MIN: ICD-10-PCS | Mod: S$GLB,,, | Performed by: PHYSICIAN ASSISTANT

## 2022-09-26 PROCEDURE — 3078F PR MOST RECENT DIASTOLIC BLOOD PRESSURE < 80 MM HG: ICD-10-PCS | Mod: CPTII,S$GLB,, | Performed by: PHYSICIAN ASSISTANT

## 2022-09-26 PROCEDURE — 3008F PR BODY MASS INDEX (BMI) DOCUMENTED: ICD-10-PCS | Mod: CPTII,S$GLB,, | Performed by: PHYSICIAN ASSISTANT

## 2022-09-26 PROCEDURE — 1160F PR REVIEW ALL MEDS BY PRESCRIBER/CLIN PHARMACIST DOCUMENTED: ICD-10-PCS | Mod: CPTII,S$GLB,, | Performed by: PHYSICIAN ASSISTANT

## 2022-09-26 PROCEDURE — 1159F PR MEDICATION LIST DOCUMENTED IN MEDICAL RECORD: ICD-10-PCS | Mod: CPTII,S$GLB,, | Performed by: PHYSICIAN ASSISTANT

## 2022-09-26 PROCEDURE — 3074F PR MOST RECENT SYSTOLIC BLOOD PRESSURE < 130 MM HG: ICD-10-PCS | Mod: CPTII,S$GLB,, | Performed by: PHYSICIAN ASSISTANT

## 2022-09-26 PROCEDURE — 3074F SYST BP LT 130 MM HG: CPT | Mod: CPTII,S$GLB,, | Performed by: PHYSICIAN ASSISTANT

## 2022-09-26 PROCEDURE — 4010F ACE/ARB THERAPY RXD/TAKEN: CPT | Mod: CPTII,S$GLB,, | Performed by: PHYSICIAN ASSISTANT

## 2022-09-26 PROCEDURE — 4010F PR ACE/ARB THEARPY RXD/TAKEN: ICD-10-PCS | Mod: CPTII,S$GLB,, | Performed by: PHYSICIAN ASSISTANT

## 2022-09-26 PROCEDURE — 1159F MED LIST DOCD IN RCRD: CPT | Mod: CPTII,S$GLB,, | Performed by: PHYSICIAN ASSISTANT

## 2022-09-26 PROCEDURE — 3008F BODY MASS INDEX DOCD: CPT | Mod: CPTII,S$GLB,, | Performed by: PHYSICIAN ASSISTANT

## 2022-09-26 PROCEDURE — 3078F DIAST BP <80 MM HG: CPT | Mod: CPTII,S$GLB,, | Performed by: PHYSICIAN ASSISTANT

## 2022-09-26 PROCEDURE — 1160F RVW MEDS BY RX/DR IN RCRD: CPT | Mod: CPTII,S$GLB,, | Performed by: PHYSICIAN ASSISTANT

## 2022-09-26 PROCEDURE — 99214 OFFICE O/P EST MOD 30 MIN: CPT | Mod: S$GLB,,, | Performed by: PHYSICIAN ASSISTANT

## 2022-09-26 RX ORDER — ZOLPIDEM TARTRATE 5 MG/1
5 TABLET ORAL NIGHTLY
Qty: 30 TABLET | Refills: 0 | Status: SHIPPED | OUTPATIENT
Start: 2022-09-26 | End: 2022-09-30 | Stop reason: SDUPTHER

## 2022-09-26 NOTE — PROGRESS NOTES
SUBJECTIVE:    Patient ID: Jean Bates is a 62 y.o. male.    Chief Complaint: Follow-up (No bottles/Pt is here for 2 mos f/u with no complaints/Decline flu and pna vaccine/BG)    Pt is a 62 y.o. male who presents today for a 2 month follow-up.  Overall, he reports feeling well and is without complaints today.  He followed up with Dr. Raymundo (general surgery) regarding his splenic infarct. A repeat CT Abd/Pelvis was completed on 8/12/22 - homogeneous normal enhancing spleen demonstrated with NO visualization of previously described hypodensity.  He denies any LUTS today.  He is due for an updated PSA.    Regarding his history of insomnia, he remains compliant was Ambien 5 mg q.h.s..  He is averaging 7 hours of rest/night.  He denies any abnormal behavior or sleep disturbances.  As for his history of HTN, BP at home ranges in the 120/80s mmHg.  BP is stable and well controlled in office today.  He continues to follow-up with Dr. Bonner (cardiology).  He denies any CP, palpitations, or syncopal episodes.    He reports his mood is stable and well controlled.  He denies any increased agitation, anxiety, or depression.    UTD: Colonoscopy (06/2015) - RTC 10 years.    Last FOBT test (11/2021) - negative.    Due for updated lab work.    Lab Visit on 08/12/2022   Component Date Value Ref Range Status    Creatinine 08/12/2022 1.1  0.5 - 1.4 mg/dL Final    eGFR 08/12/2022 >60.0  >60 mL/min/1.73 m^2 Final   No results displayed because visit has over 200 results.          Past Medical History:   Diagnosis Date    Depression     GERD (gastroesophageal reflux disease)      Past Surgical History:   Procedure Laterality Date    HERNIA REPAIR       Family History   Problem Relation Age of Onset    Hypertension Mother     Heart disease Mother     Alzheimer's disease Mother     No Known Problems Father     Kidney disease Brother         Renal stones       Marital Status:   Alcohol History:  reports that he does not  currently use alcohol after a past usage of about 4.0 standard drinks per week.  Tobacco History:  reports that he has never smoked. He has never used smokeless tobacco.  Drug History:  reports no history of drug use.    Health Maintenance Topics with due status: Not Due       Topic Last Completion Date    Lipid Panel 10/28/2021    Colorectal Cancer Screening 11/04/2021       There is no immunization history on file for this patient.    Review of patient's allergies indicates:  No Known Allergies    Current Outpatient Medications:     buPROPion (WELLBUTRIN SR) 150 MG TBSR 12 hr tablet, Take 1 tablet (150 mg total) by mouth 2 (two) times daily., Disp: 60 tablet, Rfl: 11    citalopram (CELEXA) 20 MG tablet, Take 1 tablet (20 mg total) by mouth once daily., Disp: 90 tablet, Rfl: 1    omeprazole (PRILOSEC) 20 MG capsule, Take 1 capsule (20 mg total) by mouth once daily., Disp: 180 capsule, Rfl: 1    sildenafiL (VIAGRA) 50 MG tablet, Take 1 tablet (50 mg total) by mouth daily as needed for Erectile Dysfunction., Disp: 15 tablet, Rfl: 1    valsartan (DIOVAN) 160 MG tablet, Take 160 mg by mouth once daily., Disp: , Rfl:     zolpidem (AMBIEN) 5 MG Tab, Take 1 tablet (5 mg total) by mouth every evening., Disp: 30 tablet, Rfl: 0    Review of Systems   Constitutional:  Negative for activity change, appetite change, chills, fatigue and fever.   HENT:  Negative for trouble swallowing and voice change.    Respiratory:  Negative for cough, shortness of breath and wheezing.    Cardiovascular:  Negative for chest pain, palpitations and leg swelling.   Gastrointestinal:  Negative for abdominal pain, constipation, diarrhea, nausea and vomiting.   Genitourinary:  Negative for difficulty urinating, dysuria, flank pain, frequency, hematuria and urgency.   Musculoskeletal:  Negative for myalgias.   Skin:  Negative for rash.   Neurological:  Negative for dizziness, syncope and light-headedness.   Psychiatric/Behavioral:  Negative for  "agitation, behavioral problems and sleep disturbance. The patient is not nervous/anxious.         Objective:      Vitals:    09/26/22 1032   BP: 118/72   Pulse: 67   Temp: 98.6 °F (37 °C)   TempSrc: Oral   SpO2: 98%   Weight: 92.5 kg (204 lb)   Height: 6' 1" (1.854 m)     Physical Exam  Vitals and nursing note reviewed.   Constitutional:       General: He is not in acute distress.     Appearance: Normal appearance. He is normal weight. He is not ill-appearing, toxic-appearing or diaphoretic.      Comments: WM sitting erect in an office chair in no acute distress.   HENT:      Head: Normocephalic and atraumatic.      Right Ear: External ear normal.      Left Ear: External ear normal.      Nose: Nose normal. No rhinorrhea.      Mouth/Throat:      Mouth: Mucous membranes are moist.      Pharynx: Oropharynx is clear.   Eyes:      General: No scleral icterus.     Extraocular Movements: Extraocular movements intact.      Conjunctiva/sclera: Conjunctivae normal.   Neck:      Vascular: No carotid bruit.   Cardiovascular:      Rate and Rhythm: Normal rate and regular rhythm.      Pulses: Normal pulses.      Heart sounds: Normal heart sounds. No murmur heard.    No friction rub.      Comments: No JVD noted on inspection.   Pulmonary:      Effort: Pulmonary effort is normal. No respiratory distress.      Breath sounds: Normal breath sounds. No wheezing, rhonchi or rales.   Abdominal:      General: There is no distension.      Palpations: Abdomen is soft. There is no mass.      Tenderness: There is no abdominal tenderness. There is no guarding or rebound.   Musculoskeletal:         General: Normal range of motion.      Cervical back: Normal range of motion.      Right lower leg: No edema.      Left lower leg: No edema.   Skin:     General: Skin is warm and dry.      Coloration: Skin is not jaundiced.      Findings: No rash.   Neurological:      General: No focal deficit present.      Mental Status: He is alert. Mental status is " at baseline.      Cranial Nerves: No cranial nerve deficit.      Motor: No weakness.      Coordination: Coordination normal.      Gait: Gait normal.   Psychiatric:         Mood and Affect: Mood normal.         Behavior: Behavior normal. Behavior is cooperative.         Thought Content: Thought content normal.         Judgment: Judgment normal.         Assessment:       1. Essential hypertension    2. Primary insomnia    3. Recurrent major depressive disorder, in full remission    4. Gastroesophageal reflux disease, unspecified whether esophagitis present    5. Iron deficiency anemia secondary to inadequate dietary iron intake    6. Prostate cancer screening    7. Need for influenza vaccination           Plan:       Essential hypertension  Stable and well controlled.  Continue as is.  No refills requested today.  Complete annual lab work when fasting - lab work ordered today.  -     Comprehensive Metabolic Panel; Future; Expected date: 09/26/2022  -     Lipid Panel; Future; Expected date: 09/26/2022  -     CBC Auto Differential; Future; Expected date: 09/26/2022  -     Microalbumin/Creatinine Ratio, Urine; Future  -     TSH w/reflex to FT4; Future; Expected date: 09/26/2022  -     Urinalysis, Reflex to Urine Culture Urine, Clean Catch; Future; Expected date: 09/26/2022    Primary insomnia  Currently stable and well controlled.  Continue as is.  Refills sent today.  -     zolpidem (AMBIEN) 5 MG Tab; Take 1 tablet (5 mg total) by mouth every evening.  Dispense: 30 tablet; Refill: 0    Recurrent major depressive disorder, in full remission  Currently stable and well controlled.  Continue as is.  No refills requested today.    Gastroesophageal reflux disease, unspecified whether esophagitis present  Currently stable and well controlled.  Continue as is.  No refills requested today.    Iron deficiency anemia secondary to inadequate dietary iron intake    Prostate cancer screening  PSA ordered and to be completed on  upcoming lab work.  -     PSA, Screening; Future; Expected date: 09/26/2022    Need for influenza vaccination  Offered and recommended influenza vaccine today, but patient declined.    Follow up in about 6 months (around 3/26/2023) for HTN, With labs prior to visit.        9/26/2022 Earle Hdez PA-C

## 2022-09-30 ENCOUNTER — PATIENT MESSAGE (OUTPATIENT)
Dept: FAMILY MEDICINE | Facility: CLINIC | Age: 62
End: 2022-09-30

## 2022-09-30 DIAGNOSIS — F51.01 PRIMARY INSOMNIA: ICD-10-CM

## 2022-09-30 RX ORDER — ZOLPIDEM TARTRATE 5 MG/1
5 TABLET ORAL NIGHTLY
Qty: 90 TABLET | Refills: 1 | Status: SHIPPED | OUTPATIENT
Start: 2022-09-30 | End: 2023-04-20 | Stop reason: SDUPTHER

## 2022-10-08 LAB
ALBUMIN SERPL-MCNC: 4.2 G/DL (ref 3.6–5.1)
ALBUMIN/CREAT UR: 3 MCG/MG CREAT
ALBUMIN/GLOB SERPL: 2.1 (CALC) (ref 1–2.5)
ALP SERPL-CCNC: 67 U/L (ref 35–144)
ALT SERPL-CCNC: 27 U/L (ref 9–46)
APPEARANCE UR: CLEAR
AST SERPL-CCNC: 19 U/L (ref 10–35)
BACTERIA #/AREA URNS HPF: NORMAL /HPF
BACTERIA UR CULT: NORMAL
BASOPHILS # BLD AUTO: 63 CELLS/UL (ref 0–200)
BASOPHILS NFR BLD AUTO: 1.1 %
BILIRUB SERPL-MCNC: 0.9 MG/DL (ref 0.2–1.2)
BILIRUB UR QL STRIP: NEGATIVE
BUN SERPL-MCNC: 12 MG/DL (ref 7–25)
BUN/CREAT SERPL: NORMAL (CALC) (ref 6–22)
CALCIUM SERPL-MCNC: 9.4 MG/DL (ref 8.6–10.3)
CHLORIDE SERPL-SCNC: 105 MMOL/L (ref 98–110)
CHOLEST SERPL-MCNC: 193 MG/DL
CHOLEST/HDLC SERPL: 4.2 (CALC)
CO2 SERPL-SCNC: 27 MMOL/L (ref 20–32)
COLOR UR: YELLOW
CREAT SERPL-MCNC: 1.01 MG/DL (ref 0.7–1.35)
CREAT UR-MCNC: 188 MG/DL (ref 20–320)
EGFR: 85 ML/MIN/1.73M2
EOSINOPHIL # BLD AUTO: 291 CELLS/UL (ref 15–500)
EOSINOPHIL NFR BLD AUTO: 5.1 %
ERYTHROCYTE [DISTWIDTH] IN BLOOD BY AUTOMATED COUNT: 12.2 % (ref 11–15)
GLOBULIN SER CALC-MCNC: 2 G/DL (CALC) (ref 1.9–3.7)
GLUCOSE SERPL-MCNC: 93 MG/DL (ref 65–99)
GLUCOSE UR QL STRIP: NEGATIVE
HCT VFR BLD AUTO: 47.6 % (ref 38.5–50)
HDLC SERPL-MCNC: 46 MG/DL
HGB BLD-MCNC: 16.4 G/DL (ref 13.2–17.1)
HGB UR QL STRIP: NEGATIVE
HYALINE CASTS #/AREA URNS LPF: NORMAL /LPF
KETONES UR QL STRIP: NEGATIVE
LDLC SERPL CALC-MCNC: 125 MG/DL (CALC)
LEUKOCYTE ESTERASE UR QL STRIP: NEGATIVE
LYMPHOCYTES # BLD AUTO: 1687 CELLS/UL (ref 850–3900)
LYMPHOCYTES NFR BLD AUTO: 29.6 %
MCH RBC QN AUTO: 32.3 PG (ref 27–33)
MCHC RBC AUTO-ENTMCNC: 34.5 G/DL (ref 32–36)
MCV RBC AUTO: 93.9 FL (ref 80–100)
MICROALBUMIN UR-MCNC: 0.5 MG/DL
MONOCYTES # BLD AUTO: 559 CELLS/UL (ref 200–950)
MONOCYTES NFR BLD AUTO: 9.8 %
NEUTROPHILS # BLD AUTO: 3101 CELLS/UL (ref 1500–7800)
NEUTROPHILS NFR BLD AUTO: 54.4 %
NITRITE UR QL STRIP: NEGATIVE
NONHDLC SERPL-MCNC: 147 MG/DL (CALC)
PH UR STRIP: 6.5 [PH] (ref 5–8)
PLATELET # BLD AUTO: 227 THOUSAND/UL (ref 140–400)
PMV BLD REES-ECKER: 10.2 FL (ref 7.5–12.5)
POTASSIUM SERPL-SCNC: 4.9 MMOL/L (ref 3.5–5.3)
PROT SERPL-MCNC: 6.2 G/DL (ref 6.1–8.1)
PROT UR QL STRIP: NEGATIVE
PSA SERPL-MCNC: 3.64 NG/ML
RBC # BLD AUTO: 5.07 MILLION/UL (ref 4.2–5.8)
RBC #/AREA URNS HPF: NORMAL /HPF
SERVICE CMNT-IMP: NORMAL
SODIUM SERPL-SCNC: 140 MMOL/L (ref 135–146)
SP GR UR STRIP: 1.02 (ref 1–1.03)
SQUAMOUS #/AREA URNS HPF: NORMAL /HPF
TRIGL SERPL-MCNC: 113 MG/DL
TSH SERPL-ACNC: 1.66 MIU/L (ref 0.4–4.5)
WBC # BLD AUTO: 5.7 THOUSAND/UL (ref 3.8–10.8)
WBC #/AREA URNS HPF: NORMAL /HPF

## 2022-10-10 ENCOUNTER — TELEPHONE (OUTPATIENT)
Dept: FAMILY MEDICINE | Facility: CLINIC | Age: 62
End: 2022-10-10

## 2022-10-10 NOTE — TELEPHONE ENCOUNTER
----- Message from JODI De Leon sent at 10/10/2022  1:14 PM CDT -----  Please contact patient and inform him that his lab work was reviewed.  Liver, kidney, and thyroid function are within normal limits.  No signs of anemia noted.  Cholesterol levels are well controlled, though LDL is slightly elevated.  UA is negative for any signs of infection, spilled protein, glucose, or blood.  PSA is back within reference range at 3.64.

## 2022-10-18 ENCOUNTER — TELEPHONE (OUTPATIENT)
Dept: FAMILY MEDICINE | Facility: CLINIC | Age: 62
End: 2022-10-18

## 2022-10-18 NOTE — TELEPHONE ENCOUNTER
----- Message from Karlee Ann sent at 10/18/2022 12:08 PM CDT -----  Patient called and stated that he need to come in tomorrow to see about having his ears cleaned out he has wax build up he stated that he is about to leave to go out of town please give him a call at [phone]

## 2022-10-19 ENCOUNTER — OFFICE VISIT (OUTPATIENT)
Dept: FAMILY MEDICINE | Facility: CLINIC | Age: 62
End: 2022-10-19
Payer: COMMERCIAL

## 2022-10-19 VITALS
OXYGEN SATURATION: 97 % | BODY MASS INDEX: 27.17 KG/M2 | DIASTOLIC BLOOD PRESSURE: 84 MMHG | WEIGHT: 205 LBS | HEART RATE: 65 BPM | SYSTOLIC BLOOD PRESSURE: 136 MMHG | HEIGHT: 73 IN

## 2022-10-19 DIAGNOSIS — H61.23 OBSTRUCTION OF VENTILATION TUBE OF BOTH EARS BY CERUMEN: Primary | ICD-10-CM

## 2022-10-19 PROCEDURE — 1159F PR MEDICATION LIST DOCUMENTED IN MEDICAL RECORD: ICD-10-PCS | Mod: CPTII,S$GLB,, | Performed by: NURSE PRACTITIONER

## 2022-10-19 PROCEDURE — 3079F DIAST BP 80-89 MM HG: CPT | Mod: CPTII,S$GLB,, | Performed by: NURSE PRACTITIONER

## 2022-10-19 PROCEDURE — 3079F PR MOST RECENT DIASTOLIC BLOOD PRESSURE 80-89 MM HG: ICD-10-PCS | Mod: CPTII,S$GLB,, | Performed by: NURSE PRACTITIONER

## 2022-10-19 PROCEDURE — 3061F NEG MICROALBUMINURIA REV: CPT | Mod: CPTII,S$GLB,, | Performed by: NURSE PRACTITIONER

## 2022-10-19 PROCEDURE — 4010F PR ACE/ARB THEARPY RXD/TAKEN: ICD-10-PCS | Mod: CPTII,S$GLB,, | Performed by: NURSE PRACTITIONER

## 2022-10-19 PROCEDURE — 4010F ACE/ARB THERAPY RXD/TAKEN: CPT | Mod: CPTII,S$GLB,, | Performed by: NURSE PRACTITIONER

## 2022-10-19 PROCEDURE — 3066F NEPHROPATHY DOC TX: CPT | Mod: CPTII,S$GLB,, | Performed by: NURSE PRACTITIONER

## 2022-10-19 PROCEDURE — 3075F PR MOST RECENT SYSTOLIC BLOOD PRESS GE 130-139MM HG: ICD-10-PCS | Mod: CPTII,S$GLB,, | Performed by: NURSE PRACTITIONER

## 2022-10-19 PROCEDURE — 1159F MED LIST DOCD IN RCRD: CPT | Mod: CPTII,S$GLB,, | Performed by: NURSE PRACTITIONER

## 2022-10-19 PROCEDURE — 99213 PR OFFICE/OUTPT VISIT, EST, LEVL III, 20-29 MIN: ICD-10-PCS | Mod: S$GLB,,, | Performed by: NURSE PRACTITIONER

## 2022-10-19 PROCEDURE — 3075F SYST BP GE 130 - 139MM HG: CPT | Mod: CPTII,S$GLB,, | Performed by: NURSE PRACTITIONER

## 2022-10-19 PROCEDURE — 3066F PR DOCUMENTATION OF TREATMENT FOR NEPHROPATHY: ICD-10-PCS | Mod: CPTII,S$GLB,, | Performed by: NURSE PRACTITIONER

## 2022-10-19 PROCEDURE — 3061F PR NEG MICROALBUMINURIA RESULT DOCUMENTED/REVIEW: ICD-10-PCS | Mod: CPTII,S$GLB,, | Performed by: NURSE PRACTITIONER

## 2022-10-19 PROCEDURE — 1160F PR REVIEW ALL MEDS BY PRESCRIBER/CLIN PHARMACIST DOCUMENTED: ICD-10-PCS | Mod: CPTII,S$GLB,, | Performed by: NURSE PRACTITIONER

## 2022-10-19 PROCEDURE — 99213 OFFICE O/P EST LOW 20 MIN: CPT | Mod: S$GLB,,, | Performed by: NURSE PRACTITIONER

## 2022-10-19 PROCEDURE — 1160F RVW MEDS BY RX/DR IN RCRD: CPT | Mod: CPTII,S$GLB,, | Performed by: NURSE PRACTITIONER

## 2022-10-19 NOTE — PROGRESS NOTES
Patient ID: Jean Bates is a 62 y.o. male.    Chief Complaint: Ear Fullness (No bottles//Pt c/o wax fullness in both ears//Decline flu and pna vaccine//[facility] )    Patient here for sick visit.  Patient reports history of cerumen obstructions and last few days has felt fullness in both of his ears with decreased hearing.  States he is flying out of town soon and would like obstruction removed before he leaves.  Patient denies ear pain          Past Medical History:   Diagnosis Date    Depression     GERD (gastroesophageal reflux disease)      Past Surgical History:   Procedure Laterality Date    HERNIA REPAIR           Tobacco History:  reports that he has never smoked. He has never used smokeless tobacco.      Review of patient's allergies indicates:  No Known Allergies    Current Outpatient Medications:     buPROPion (WELLBUTRIN SR) 150 MG TBSR 12 hr tablet, Take 1 tablet (150 mg total) by mouth 2 (two) times daily., Disp: 60 tablet, Rfl: 11    citalopram (CELEXA) 20 MG tablet, Take 1 tablet (20 mg total) by mouth once daily., Disp: 90 tablet, Rfl: 1    omeprazole (PRILOSEC) 20 MG capsule, Take 1 capsule (20 mg total) by mouth once daily., Disp: 180 capsule, Rfl: 1    sildenafiL (VIAGRA) 50 MG tablet, Take 1 tablet (50 mg total) by mouth daily as needed for Erectile Dysfunction., Disp: 15 tablet, Rfl: 1    valsartan (DIOVAN) 160 MG tablet, Take 160 mg by mouth once daily., Disp: , Rfl:     zolpidem (AMBIEN) 5 MG Tab, Take 1 tablet (5 mg total) by mouth every evening., Disp: 90 tablet, Rfl: 1    Review of Systems   Constitutional:  Negative for chills and fever.   HENT:  Positive for hearing loss (mild). Negative for ear discharge, ear pain and sore throat.    Respiratory:  Negative for cough and shortness of breath.    Cardiovascular:  Negative for chest pain.   Neurological:  Negative for dizziness.        Objective:      Vitals:    10/19/22 0903   BP: 136/84   Pulse: 65   SpO2: 97%   Weight: 93 kg (205 lb)  "  Height: 6' 1" (1.854 m)     Physical Exam  Constitutional:       General: He is not in acute distress.     Appearance: Normal appearance. He is normal weight.   HENT:      Right Ear: There is impacted cerumen.      Left Ear: There is impacted cerumen.   Cardiovascular:      Rate and Rhythm: Normal rate and regular rhythm.      Heart sounds: No murmur heard.  Pulmonary:      Effort: Pulmonary effort is normal. No respiratory distress.      Breath sounds: Normal breath sounds.   Musculoskeletal:      Cervical back: Neck supple.   Skin:     General: Skin is warm and dry.   Neurological:      General: No focal deficit present.      Mental Status: He is alert and oriented to person, place, and time.         Assessment:       1. Obstruction of ventilation tube of both ears by cerumen           Plan:       Obstruction of ventilation tube of both ears by cerumen  Comments:  Bilateral cerumen obstruction effectively resolved with irrigation    Follow up if symptoms worsen or fail to improve, for as scheduled.        10/19/2022 Tashia Posadas NP        "

## 2022-11-12 ENCOUNTER — HOSPITAL ENCOUNTER (EMERGENCY)
Facility: HOSPITAL | Age: 62
Discharge: HOME | End: 2022-11-13
Attending: EMERGENCY MEDICINE
Payer: COMMERCIAL

## 2022-11-12 DIAGNOSIS — S01.81XA FACIAL LACERATION, INITIAL ENCOUNTER: ICD-10-CM

## 2022-11-12 DIAGNOSIS — S05.02XA ABRASION OF LEFT CORNEA, INITIAL ENCOUNTER: Primary | ICD-10-CM

## 2022-11-12 PROCEDURE — 25000003 PHARM REV CODE 250: Performed by: EMERGENCY MEDICINE

## 2022-11-12 PROCEDURE — 12011 RPR F/E/E/N/L/M 2.5 CM/<: CPT

## 2022-11-12 PROCEDURE — 99284 EMERGENCY DEPT VISIT MOD MDM: CPT | Mod: 25

## 2022-11-12 PROCEDURE — 25000003 PHARM REV CODE 250: Performed by: STUDENT IN AN ORGANIZED HEALTH CARE EDUCATION/TRAINING PROGRAM

## 2022-11-12 RX ORDER — OXYCODONE AND ACETAMINOPHEN 5; 325 MG/1; MG/1
1 TABLET ORAL EVERY 6 HOURS PRN
Qty: 12 TABLET | Refills: 0 | Status: SHIPPED | OUTPATIENT
Start: 2022-11-12 | End: 2023-03-27

## 2022-11-12 RX ORDER — ERYTHROMYCIN 5 MG/G
OINTMENT OPHTHALMIC
Qty: 1 G | Refills: 0 | Status: SHIPPED | OUTPATIENT
Start: 2022-11-12 | End: 2022-11-12 | Stop reason: SDUPTHER

## 2022-11-12 RX ORDER — ERYTHROMYCIN 5 MG/G
OINTMENT OPHTHALMIC
Qty: 1 G | Refills: 0 | Status: SHIPPED | OUTPATIENT
Start: 2022-11-12 | End: 2022-11-16

## 2022-11-12 RX ORDER — OXYCODONE AND ACETAMINOPHEN 5; 325 MG/1; MG/1
1 TABLET ORAL
Status: COMPLETED | OUTPATIENT
Start: 2022-11-12 | End: 2022-11-12

## 2022-11-12 RX ORDER — TETRACAINE HYDROCHLORIDE 5 MG/ML
2 SOLUTION OPHTHALMIC
Status: COMPLETED | OUTPATIENT
Start: 2022-11-12 | End: 2022-11-12

## 2022-11-12 RX ADMIN — TETRACAINE HYDROCHLORIDE 2 DROP: 5 SOLUTION OPHTHALMIC at 10:11

## 2022-11-12 RX ADMIN — OXYCODONE AND ACETAMINOPHEN 1 TABLET: 325; 5 TABLET ORAL at 10:11

## 2022-11-12 RX ADMIN — FLUORESCEIN SODIUM 1 EACH: 1 STRIP OPHTHALMIC at 10:11

## 2022-11-13 VITALS
BODY MASS INDEX: 26.39 KG/M2 | HEART RATE: 64 BPM | TEMPERATURE: 98 F | OXYGEN SATURATION: 97 % | SYSTOLIC BLOOD PRESSURE: 147 MMHG | DIASTOLIC BLOOD PRESSURE: 88 MMHG | WEIGHT: 200 LBS | RESPIRATION RATE: 18 BRPM

## 2022-11-13 PROCEDURE — 12011 RPR F/E/E/N/L/M 2.5 CM/<: CPT

## 2022-11-13 NOTE — DISCHARGE INSTRUCTIONS
Please return to the ER if you have any of the following:  - Severe headache that does not get better with medications  - Changes in your vision  - Drainage of white or yellow cloudy fluid from your eye or wound  - Nausea or vomiting  - Fever (temperature of 100.4F or higher)  - Chest pain or trouble breathing  - Any new symptoms or concerns

## 2022-11-13 NOTE — ED PROVIDER NOTES
"Encounter Date: 11/12/2022       History     Chief Complaint   Patient presents with    Eye Injury     Left eye.  Was pulling patio chair out of pool and leg of chair hit him in eye.      Patient is a 62-year-old male here for left eye pain and facial laceration after attempting to lift a pool out of the chair and hitting himself in the face with the leg of the chair.  Patient denies change in vision, LOC, injuries elsewhere, nausea, vomiting, abdominal pain, chest pain, shortness of breath.  He endorses significant pain and sensation of grit in his left eye.  He states that his Tdap is not up-to-date but he would not like to receive the vaccination at this time, states that he does not "do shots."    The history is provided by the patient.   Review of patient's allergies indicates:  No Known Allergies  Past Medical History:   Diagnosis Date    Depression     GERD (gastroesophageal reflux disease)      Past Surgical History:   Procedure Laterality Date    HERNIA REPAIR       Family History   Problem Relation Age of Onset    Hypertension Mother     Heart disease Mother     Alzheimer's disease Mother     No Known Problems Father     Kidney disease Brother         Renal stones     Social History     Tobacco Use    Smoking status: Never    Smokeless tobacco: Never   Substance Use Topics    Alcohol use: Not Currently     Alcohol/week: 4.0 standard drinks     Types: 4 Cans of beer per week     Comment: Drinks 2-4 beers daily    Drug use: Never     Review of Systems   Constitutional:  Negative for chills and fever.   HENT:  Negative for nosebleeds.    Eyes:  Positive for pain and redness. Negative for visual disturbance.   Respiratory:  Negative for cough and shortness of breath.    Cardiovascular:  Negative for chest pain.   Gastrointestinal:  Negative for abdominal pain, nausea and vomiting.   Musculoskeletal:  Negative for neck pain.   Skin:  Positive for wound. Negative for rash.   Neurological:  Negative for syncope. "   Psychiatric/Behavioral:  Negative for agitation and confusion.      Physical Exam     Initial Vitals [11/12/22 2140]   BP Pulse Resp Temp SpO2   (!) 149/90 82 (!) 22 98.3 °F (36.8 °C) 96 %      MAP       --         Physical Exam    Nursing note and vitals reviewed.  Constitutional: He is not diaphoretic.  Non-toxic appearance.   HENT:   Head: Normocephalic and atraumatic.   Eyes: EOM are normal. Pupils are equal, round, and reactive to light. Right eye exhibits no discharge. Left eye exhibits no discharge.   Mild conjunctival injection to left eye.  Small round area of fluorescein uptake at 6 o'clock position to left cornea.  Right IOP 23, left IOP 30   Neck: Neck supple.   Normal range of motion.  Cardiovascular:  Normal rate, regular rhythm and normal heart sounds.           No murmur heard.  Pulmonary/Chest: Breath sounds normal. No respiratory distress.   Abdominal: Abdomen is soft. He exhibits no distension. There is no abdominal tenderness. There is no guarding.   Musculoskeletal:      Cervical back: Normal range of motion and neck supple.      Comments: No obvious deformities to extremities.     Neurological: He is alert and oriented to person, place, and time.   Moves all extremities well and equally. Cranial nerves 2-12 intact.  Strength 5/5 and sensation intact to light touch in all 4 extremities   Skin: Skin is warm and dry.   Psychiatric: He has a normal mood and affect. Thought content normal.       ED Course   Lac Repair    Date/Time: 11/13/2022 2:32 AM  Performed by: Miroslava George MD  Authorized by: Chucho Cochran MD     Consent:     Consent obtained:  Verbal    Consent given by:  Patient    Risks, benefits, and alternatives were discussed: yes      Risks discussed:  Pain, poor cosmetic result, need for additional repair, infection, poor wound healing, retained foreign body and nerve damage    Alternatives discussed:  No treatment  Anesthesia:     Anesthesia method:  None  Laceration  details:     Location:  Face    Face location:  L cheek    Length (cm):  1  Pre-procedure details:     Preparation:  Imaging obtained to evaluate for foreign bodies  Exploration:     Hemostasis achieved with:  Direct pressure    Imaging outcome: foreign body not noted      Wound exploration: wound explored through full range of motion and entire depth of wound visualized      Contaminated: no    Treatment:     Amount of cleaning:  Standard    Irrigation solution:  Sterile saline    Irrigation method:  Syringe    Undermining:  None    Scar revision: no    Skin repair:     Repair method:  Tissue adhesive  Approximation:     Approximation:  Close  Repair type:     Repair type:  Simple  Post-procedure details:     Dressing:  Open (no dressing)    Procedure completion:  Tolerated well, no immediate complications  Labs Reviewed - No data to display       Imaging Results              CT Orbits Sella Post Fossa Without Cont (Final result)  Result time 11/12/22 22:55:58      Final result by Axel Lora MD (11/12/22 22:55:58)                   Narrative:    CT SELLA WITHOUT IV CONTRAST    INDICATION: 62 years Male; Orbital trauma    TECHNIQUE:  CT scan of the orbits were performed without IV contrast.  This exam was performed according to our departmental dose-optimization program, which includes automated exposure control, adjustment of the mA and/or kV according to patient size and/or use of iterative reconstruction technique.    COMPARISON: None.    FINDINGS:  Bones: No visible fracture of the nasal bones or orbital bones.  Sinuses: Several small nodules in the bilateral maxillary sinuses could represent polyposis or mucous retention cysts. No air-fluid levels in the paranasal sinuses. Mild mucosal thickening of the bilateral maxillary sinuses with narrowing of the left ostiomeatal complex. Right-to-left nasal septal deviation.  Orbits: Bilateral globes are round, symmetric, and intact. No retro-orbital fluid  collection.  Soft tissues: Unremarkable.  Incidental findings: None.    IMPRESSION:  1.  No visible fracture of the orbital bones.    Electronically signed by:  Axel Lora  11/12/2022 10:55 PM CST Workstation: [id number]                                     CT Head Without Contrast (Final result)  Result time 11/12/22 22:50:51      Final result by Georgina Fry DO (11/12/22 22:50:51)                   Narrative:    EXAM:  CT Head Without Intravenous Contrast    [facility]    REFERRING:  AAAREFERRAL SELF    CLINICAL HISTORY:  62 years, Male; Head trauma, moderate-severe .7    TECHNIQUE:  Axial computed tomography images of the head/brain without intravenous contrast.  Sagittal and coronal reformatted images were created and reviewed.  This CT exam was performed using one or more of the following dose reduction techniques:  automated exposure control, adjustment of the mA and/or kV according to patient size, and/or use of iterative reconstruction technique.    COMPARISON:  No relevant prior studies available.    FINDINGS:  Brain:  Periventricular and deep white matter hypodensities consistent with chronic microvascular ischemic change.  No hemorrhage.  Ventricles:  Prominence of the ventricles and sulci consistent with diffuse atrophy.  Bones/joints:  No acute pathology.  No acute fracture.  Soft tissues:  No acute pathology.  Vasculature:  Intracranial atherosclerosis.  Sinuses:  Knee is retention cysts are seen within the bilateral maxillary sinuses. There is minimal mucosal thickening in the left ethmoid sinuses.  Mastoid air cells:  Mastoid air cells are clear.  Orbits:  Unremarkable as visualized.    IMPRESSION:  1.  Chronic changes as above.  2.  No acute intracranial findings.  3.  If symptoms persist consider MRI for further evaluation.    Electronically signed by:  Georgina Fry DO  11/12/2022 10:50 PM CST Workstation: [id number]                                      Medications   fluorescein ophthalmic strip 1 each (1 each Both Eyes Given 11/12/22 2227)   TETRAcaine HCl (PF) 0.5 % Drop 2 drop (2 drops Both Eyes Given 11/12/22 2227)   oxyCODONE-acetaminophen 5-325 mg per tablet 1 tablet (1 tablet Oral Given 11/12/22 2234)     Medical Decision Making:   Initial Assessment:   Patient is a 62-year-old male here with left eye pain, facial laceration after being hit in the face with a chair.  Patient nontoxic appearing, GCS 15, blood pressure 149/90 with normal heart rate, satting well on room air with normal respiratory rate, afebrile.  Differential Diagnosis:   Retained foreign body, ruptured globe, corneal abrasion, acute fracture.  Clinical Tests:   Radiological Study: Ordered and Reviewed  ED Management:  CT head and CT orbits without acute abnormalities.  Fluorescein staining notable for corneal abrasion to 6 o'clock position of left cornea.  IOP mildly elevated in bilateral eyes, however patient states that this is baseline for him.  Patient given Percocet for pain control.  Wound cleaned and repaired with skin glue at bedside.  Patient hemodynamically stable and appropriate for discharge with erythromycin ointment, Percocet p.r.n., ophthalmology follow-up within 2 days.  Patient given strict return precautions.  Patient verbalized understanding agrees with plan.  Case discussed with Dr. Cochran.                        Clinical Impression:   Final diagnoses:  [S05.02XA] Abrasion of left cornea, initial encounter (Primary)  [S01.81XA] Facial laceration, initial encounter        ED Disposition Condition    Discharge Stable          ED Prescriptions       Medication Sig Dispense Start Date End Date Auth. Provider    erythromycin (ROMYCIN) ophthalmic ointment Place a 1/2 inch ribbon of ointment into the lower eyelid for 5 days. 1 g 11/12/2022 11/16/2022 Miroslava George MD          Follow-up Information    None          Miroslava George MD  Resident  11/13/22  [id number]

## 2023-02-20 ENCOUNTER — PATIENT MESSAGE (OUTPATIENT)
Dept: FAMILY MEDICINE | Facility: CLINIC | Age: 63
End: 2023-02-20

## 2023-02-20 DIAGNOSIS — F33.42 RECURRENT MAJOR DEPRESSIVE DISORDER, IN FULL REMISSION: ICD-10-CM

## 2023-02-20 RX ORDER — BUPROPION HYDROCHLORIDE 150 MG/1
150 TABLET, EXTENDED RELEASE ORAL 2 TIMES DAILY
Qty: 60 TABLET | Refills: 5 | Status: SHIPPED | OUTPATIENT
Start: 2023-02-20 | End: 2023-08-21 | Stop reason: SDUPTHER

## 2023-03-13 RX ORDER — VALSARTAN 160 MG/1
160 TABLET ORAL DAILY
Qty: 30 TABLET | Refills: 5 | Status: SHIPPED | OUTPATIENT
Start: 2023-03-13 | End: 2023-09-19 | Stop reason: SDUPTHER

## 2023-03-13 NOTE — TELEPHONE ENCOUNTER
----- Message from Dara Chadwick sent at 3/13/2023 11:19 AM CDT -----   pt needs refill on valsartan   Naga ohara   [phone]

## 2023-03-17 ENCOUNTER — TELEPHONE (OUTPATIENT)
Dept: FAMILY MEDICINE | Facility: CLINIC | Age: 63
End: 2023-03-17

## 2023-03-17 ENCOUNTER — PATIENT MESSAGE (OUTPATIENT)
Dept: FAMILY MEDICINE | Facility: CLINIC | Age: 63
End: 2023-03-17

## 2023-03-17 DIAGNOSIS — Z79.899 HIGH RISK MEDICATION USE: Primary | ICD-10-CM

## 2023-03-17 DIAGNOSIS — Z79.899 ENCOUNTER FOR LONG-TERM (CURRENT) USE OF OTHER MEDICATIONS: ICD-10-CM

## 2023-03-17 DIAGNOSIS — D64.9 ANEMIA, UNSPECIFIED TYPE: ICD-10-CM

## 2023-03-17 DIAGNOSIS — I10 ESSENTIAL HYPERTENSION: ICD-10-CM

## 2023-03-23 LAB
ALBUMIN SERPL-MCNC: 4.3 G/DL (ref 3.6–5.1)
ALBUMIN/CREAT UR: NORMAL MCG/MG CREAT
ALBUMIN/GLOB SERPL: 2.2 (CALC) (ref 1–2.5)
ALP SERPL-CCNC: 63 U/L (ref 35–144)
ALT SERPL-CCNC: 20 U/L (ref 9–46)
APPEARANCE UR: CLEAR
AST SERPL-CCNC: 20 U/L (ref 10–35)
BACTERIA #/AREA URNS HPF: NORMAL /HPF
BACTERIA UR CULT: NORMAL
BASOPHILS # BLD AUTO: 70 CELLS/UL (ref 0–200)
BASOPHILS NFR BLD AUTO: 1.1 %
BILIRUB SERPL-MCNC: 0.8 MG/DL (ref 0.2–1.2)
BILIRUB UR QL STRIP: NEGATIVE
BUN SERPL-MCNC: 14 MG/DL (ref 7–25)
BUN/CREAT SERPL: NORMAL (CALC) (ref 6–22)
CALCIUM SERPL-MCNC: 9.5 MG/DL (ref 8.6–10.3)
CHLORIDE SERPL-SCNC: 102 MMOL/L (ref 98–110)
CHOLEST SERPL-MCNC: 206 MG/DL
CHOLEST/HDLC SERPL: 4.4 (CALC)
CO2 SERPL-SCNC: 28 MMOL/L (ref 20–32)
COLOR UR: YELLOW
CREAT SERPL-MCNC: 0.97 MG/DL (ref 0.7–1.35)
CREAT UR-MCNC: 97 MG/DL (ref 20–320)
EGFR: 89 ML/MIN/1.73M2
EOSINOPHIL # BLD AUTO: 243 CELLS/UL (ref 15–500)
EOSINOPHIL NFR BLD AUTO: 3.8 %
ERYTHROCYTE [DISTWIDTH] IN BLOOD BY AUTOMATED COUNT: 12.5 % (ref 11–15)
GLOBULIN SER CALC-MCNC: 2 G/DL (CALC) (ref 1.9–3.7)
GLUCOSE SERPL-MCNC: 75 MG/DL (ref 65–139)
GLUCOSE UR QL STRIP: NEGATIVE
HCT VFR BLD AUTO: 47.4 % (ref 38.5–50)
HDLC SERPL-MCNC: 47 MG/DL
HGB BLD-MCNC: 16.6 G/DL (ref 13.2–17.1)
HGB UR QL STRIP: NEGATIVE
HYALINE CASTS #/AREA URNS LPF: NORMAL /LPF
KETONES UR QL STRIP: NEGATIVE
LDLC SERPL CALC-MCNC: 122 MG/DL (CALC)
LEUKOCYTE ESTERASE UR QL STRIP: NEGATIVE
LYMPHOCYTES # BLD AUTO: 2176 CELLS/UL (ref 850–3900)
LYMPHOCYTES NFR BLD AUTO: 34 %
MCH RBC QN AUTO: 32.1 PG (ref 27–33)
MCHC RBC AUTO-ENTMCNC: 35 G/DL (ref 32–36)
MCV RBC AUTO: 91.7 FL (ref 80–100)
MICROALBUMIN UR-MCNC: <0.2 MG/DL
MONOCYTES # BLD AUTO: 704 CELLS/UL (ref 200–950)
MONOCYTES NFR BLD AUTO: 11 %
NEUTROPHILS # BLD AUTO: 3206 CELLS/UL (ref 1500–7800)
NEUTROPHILS NFR BLD AUTO: 50.1 %
NITRITE UR QL STRIP: NEGATIVE
NONHDLC SERPL-MCNC: 159 MG/DL (CALC)
PH UR STRIP: 7 [PH] (ref 5–8)
PLATELET # BLD AUTO: 271 THOUSAND/UL (ref 140–400)
PMV BLD REES-ECKER: 9.9 FL (ref 7.5–12.5)
POTASSIUM SERPL-SCNC: 5.2 MMOL/L (ref 3.5–5.3)
PROT SERPL-MCNC: 6.3 G/DL (ref 6.1–8.1)
PROT UR QL STRIP: NEGATIVE
RBC # BLD AUTO: 5.17 MILLION/UL (ref 4.2–5.8)
RBC #/AREA URNS HPF: NORMAL /HPF
SERVICE CMNT-IMP: NORMAL
SODIUM SERPL-SCNC: 138 MMOL/L (ref 135–146)
SP GR UR STRIP: 1.02 (ref 1–1.03)
SQUAMOUS #/AREA URNS HPF: NORMAL /HPF
TRIGL SERPL-MCNC: 246 MG/DL
TSH SERPL-ACNC: 1.51 MIU/L (ref 0.4–4.5)
WBC # BLD AUTO: 6.4 THOUSAND/UL (ref 3.8–10.8)
WBC #/AREA URNS HPF: NORMAL /HPF

## 2023-03-27 ENCOUNTER — OFFICE VISIT (OUTPATIENT)
Dept: FAMILY MEDICINE | Facility: CLINIC | Age: 63
End: 2023-03-27
Payer: COMMERCIAL

## 2023-03-27 VITALS
SYSTOLIC BLOOD PRESSURE: 136 MMHG | WEIGHT: 203.19 LBS | OXYGEN SATURATION: 98 % | HEIGHT: 73 IN | HEART RATE: 84 BPM | RESPIRATION RATE: 18 BRPM | DIASTOLIC BLOOD PRESSURE: 82 MMHG | BODY MASS INDEX: 26.93 KG/M2

## 2023-03-27 DIAGNOSIS — F33.42 RECURRENT MAJOR DEPRESSIVE DISORDER, IN FULL REMISSION: ICD-10-CM

## 2023-03-27 DIAGNOSIS — E78.5 HYPERLIPIDEMIA, UNSPECIFIED HYPERLIPIDEMIA TYPE: Primary | ICD-10-CM

## 2023-03-27 DIAGNOSIS — K21.9 GASTROESOPHAGEAL REFLUX DISEASE, UNSPECIFIED WHETHER ESOPHAGITIS PRESENT: ICD-10-CM

## 2023-03-27 DIAGNOSIS — Z79.899 HIGH RISK MEDICATION USE: ICD-10-CM

## 2023-03-27 DIAGNOSIS — F51.01 PRIMARY INSOMNIA: ICD-10-CM

## 2023-03-27 DIAGNOSIS — I10 ESSENTIAL HYPERTENSION: ICD-10-CM

## 2023-03-27 PROCEDURE — 3061F PR NEG MICROALBUMINURIA RESULT DOCUMENTED/REVIEW: ICD-10-PCS | Mod: CPTII,S$GLB,, | Performed by: NURSE PRACTITIONER

## 2023-03-27 PROCEDURE — 1159F PR MEDICATION LIST DOCUMENTED IN MEDICAL RECORD: ICD-10-PCS | Mod: CPTII,S$GLB,, | Performed by: NURSE PRACTITIONER

## 2023-03-27 PROCEDURE — 4010F PR ACE/ARB THEARPY RXD/TAKEN: ICD-10-PCS | Mod: CPTII,S$GLB,, | Performed by: NURSE PRACTITIONER

## 2023-03-27 PROCEDURE — 1160F PR REVIEW ALL MEDS BY PRESCRIBER/CLIN PHARMACIST DOCUMENTED: ICD-10-PCS | Mod: CPTII,S$GLB,, | Performed by: NURSE PRACTITIONER

## 2023-03-27 PROCEDURE — 1160F RVW MEDS BY RX/DR IN RCRD: CPT | Mod: CPTII,S$GLB,, | Performed by: NURSE PRACTITIONER

## 2023-03-27 PROCEDURE — 3075F SYST BP GE 130 - 139MM HG: CPT | Mod: CPTII,S$GLB,, | Performed by: NURSE PRACTITIONER

## 2023-03-27 PROCEDURE — 3075F PR MOST RECENT SYSTOLIC BLOOD PRESS GE 130-139MM HG: ICD-10-PCS | Mod: CPTII,S$GLB,, | Performed by: NURSE PRACTITIONER

## 2023-03-27 PROCEDURE — 3066F NEPHROPATHY DOC TX: CPT | Mod: CPTII,S$GLB,, | Performed by: NURSE PRACTITIONER

## 2023-03-27 PROCEDURE — 3079F PR MOST RECENT DIASTOLIC BLOOD PRESSURE 80-89 MM HG: ICD-10-PCS | Mod: CPTII,S$GLB,, | Performed by: NURSE PRACTITIONER

## 2023-03-27 PROCEDURE — 99214 PR OFFICE/OUTPT VISIT, EST, LEVL IV, 30-39 MIN: ICD-10-PCS | Mod: S$GLB,,, | Performed by: NURSE PRACTITIONER

## 2023-03-27 PROCEDURE — 3066F PR DOCUMENTATION OF TREATMENT FOR NEPHROPATHY: ICD-10-PCS | Mod: CPTII,S$GLB,, | Performed by: NURSE PRACTITIONER

## 2023-03-27 PROCEDURE — 3079F DIAST BP 80-89 MM HG: CPT | Mod: CPTII,S$GLB,, | Performed by: NURSE PRACTITIONER

## 2023-03-27 PROCEDURE — 3008F PR BODY MASS INDEX (BMI) DOCUMENTED: ICD-10-PCS | Mod: CPTII,S$GLB,, | Performed by: NURSE PRACTITIONER

## 2023-03-27 PROCEDURE — 3061F NEG MICROALBUMINURIA REV: CPT | Mod: CPTII,S$GLB,, | Performed by: NURSE PRACTITIONER

## 2023-03-27 PROCEDURE — 4010F ACE/ARB THERAPY RXD/TAKEN: CPT | Mod: CPTII,S$GLB,, | Performed by: NURSE PRACTITIONER

## 2023-03-27 PROCEDURE — 99214 OFFICE O/P EST MOD 30 MIN: CPT | Mod: S$GLB,,, | Performed by: NURSE PRACTITIONER

## 2023-03-27 PROCEDURE — 3008F BODY MASS INDEX DOCD: CPT | Mod: CPTII,S$GLB,, | Performed by: NURSE PRACTITIONER

## 2023-03-27 PROCEDURE — 1159F MED LIST DOCD IN RCRD: CPT | Mod: CPTII,S$GLB,, | Performed by: NURSE PRACTITIONER

## 2023-03-27 RX ORDER — ROSUVASTATIN CALCIUM 5 MG/1
5 TABLET, COATED ORAL DAILY
Qty: 90 TABLET | Refills: 3 | Status: SHIPPED | OUTPATIENT
Start: 2023-03-27 | End: 2024-01-15 | Stop reason: SDUPTHER

## 2023-03-27 NOTE — PROGRESS NOTES
SUBJECTIVE:    Patient ID: Jean Bates is a 62 y.o. male.    Chief Complaint: Gastroesophageal Reflux (F/u) and Hypertension (F/u)    62 year old male  presents for check up. He is treated for anxiety and depression ed, insomnia, gerd, .recently had labs. Would like to discuss results. Taking meds as prescribed. Bp in office is well controlled.  No dizziness, syncope, chest pain, or heart palpitations. Never smoker. No history of asthma. No family or personal cardiac history. Moods seem well controlled. Sleeping well. Takes ambien nightly.       Office Visit on 03/27/2023   Component Date Value Ref Range Status    Magnesium 03/27/2023 2.2  1.5 - 2.5 mg/dL Final   Telephone on 03/17/2023   Component Date Value Ref Range Status    WBC 03/22/2023 6.4  3.8 - 10.8 Thousand/uL Final    RBC 03/22/2023 5.17  4.20 - 5.80 Million/uL Final    Hemoglobin 03/22/2023 16.6  13.2 - 17.1 g/dL Final    Hematocrit 03/22/2023 47.4  38.5 - 50.0 % Final    MCV 03/22/2023 91.7  80.0 - 100.0 fL Final    MCH 03/22/2023 32.1  27.0 - 33.0 pg Final    MCHC 03/22/2023 35.0  32.0 - 36.0 g/dL Final    RDW 03/22/2023 12.5  11.0 - 15.0 % Final    Platelets 03/22/2023 271  140 - 400 Thousand/uL Final    MPV 03/22/2023 9.9  7.5 - 12.5 fL Final    Neutrophils, Abs 03/22/2023 3,206  1,500 - 7,800 cells/uL Final    Lymph # 03/22/2023 2,176  850 - 3,900 cells/uL Final    Mono # 03/22/2023 704  200 - 950 cells/uL Final    Eos # 03/22/2023 243  15 - 500 cells/uL Final    Baso # 03/22/2023 70  0 - 200 cells/uL Final    Neutrophils Relative 03/22/2023 50.1  % Final    Lymph % 03/22/2023 34.0  % Final    Mono % 03/22/2023 11.0  % Final    Eosinophil % 03/22/2023 3.8  % Final    Basophil % 03/22/2023 1.1  % Final    Glucose 03/22/2023 75  65 - 139 mg/dL Final    BUN 03/22/2023 14  7 - 25 mg/dL Final    Creatinine 03/22/2023 0.97  0.70 - 1.35 mg/dL Final    eGFR 03/22/2023 89  > OR = 60 mL/min/1.73m2 Final    BUN/Creatinine Ratio 03/22/2023 NOT APPLICABLE   6 - 22 (calc) Final    Sodium 03/22/2023 138  135 - 146 mmol/L Final    Potassium 03/22/2023 5.2  3.5 - 5.3 mmol/L Final    Chloride 03/22/2023 102  98 - 110 mmol/L Final    CO2 03/22/2023 28  20 - 32 mmol/L Final    Calcium 03/22/2023 9.5  8.6 - 10.3 mg/dL Final    Total Protein 03/22/2023 6.3  6.1 - 8.1 g/dL Final    Albumin 03/22/2023 4.3  3.6 - 5.1 g/dL Final    Globulin, Total 03/22/2023 2.0  1.9 - 3.7 g/dL (calc) Final    Albumin/Globulin Ratio 03/22/2023 2.2  1.0 - 2.5 (calc) Final    Total Bilirubin 03/22/2023 0.8  0.2 - 1.2 mg/dL Final    Alkaline Phosphatase 03/22/2023 63  35 - 144 U/L Final    AST 03/22/2023 20  10 - 35 U/L Final    ALT 03/22/2023 20  9 - 46 U/L Final    Cholesterol 03/22/2023 206 (H)  <200 mg/dL Final    HDL 03/22/2023 47  > OR = 40 mg/dL Final    Triglycerides 03/22/2023 246 (H)  <150 mg/dL Final    LDL Cholesterol 03/22/2023 122 (H)  mg/dL (calc) Final    HDL/Cholesterol Ratio 03/22/2023 4.4  <5.0 (calc) Final    Non HDL Chol. (LDL+VLDL) 03/22/2023 159 (H)  <130 mg/dL (calc) Final    Color, UA 03/22/2023 YELLOW  YELLOW Final    Appearance, UA 03/22/2023 CLEAR  CLEAR Final    Specific Gravity, UA 03/22/2023 1.016  1.001 - 1.035 Final    pH, UA 03/22/2023 7.0  5.0 - 8.0 Final    Glucose, UA 03/22/2023 NEGATIVE  NEGATIVE Final    Bilirubin, UA 03/22/2023 NEGATIVE  NEGATIVE Final    Ketones, UA 03/22/2023 NEGATIVE  NEGATIVE Final    Occult Blood UA 03/22/2023 NEGATIVE  NEGATIVE Final    Protein, UA 03/22/2023 NEGATIVE  NEGATIVE Final    Nitrite, UA 03/22/2023 NEGATIVE  NEGATIVE Final    Leukocytes, UA 03/22/2023 NEGATIVE  NEGATIVE Final    WBC Casts, UA 03/22/2023 NONE SEEN  < OR = 5 /HPF Final    RBC Casts, UA 03/22/2023 NONE SEEN  < OR = 2 /HPF Final    Squam Epithel, UA 03/22/2023 NONE SEEN  < OR = 5 /HPF Final    Bacteria, UA 03/22/2023 NONE SEEN  NONE SEEN /HPF Final    Hyaline Casts, UA 03/22/2023 NONE SEEN  NONE SEEN /LPF Final    Service Cmt: 03/22/2023    Final    Reflexive Urine  Culture 03/22/2023    Final    TSH w/reflex to FT4 03/22/2023 1.51  0.40 - 4.50 mIU/L Final    Creatinine, Urine 03/22/2023 97  20 - 320 mg/dL Final    Microalb, Ur 03/22/2023 <0.2  See Note: mg/dL Final    Microalb/Creat Ratio 03/22/2023 NOTE  <30 mcg/mg creat Final       Past Medical History:   Diagnosis Date    Depression     GERD (gastroesophageal reflux disease)      Social History     Socioeconomic History    Marital status:    Tobacco Use    Smoking status: Never    Smokeless tobacco: Never   Substance and Sexual Activity    Alcohol use: Not Currently     Alcohol/week: 4.0 standard drinks     Types: 4 Cans of beer per week     Comment: Drinks 2-4 beers daily    Drug use: Never    Sexual activity: Yes     Partners: Female     Past Surgical History:   Procedure Laterality Date    HERNIA REPAIR       Family History   Problem Relation Age of Onset    Hypertension Mother     Heart disease Mother     Alzheimer's disease Mother     No Known Problems Father     Kidney disease Brother         Renal stones       Review of patient's allergies indicates:  No Known Allergies    Current Outpatient Medications:     buPROPion (WELLBUTRIN SR) 150 MG TBSR 12 hr tablet, Take 1 tablet (150 mg total) by mouth 2 (two) times daily., Disp: 60 tablet, Rfl: 5    citalopram (CELEXA) 20 MG tablet, Take 1 tablet (20 mg total) by mouth once daily., Disp: 90 tablet, Rfl: 1    omeprazole (PRILOSEC) 20 MG capsule, Take 1 capsule (20 mg total) by mouth once daily., Disp: 180 capsule, Rfl: 1    sildenafiL (VIAGRA) 50 MG tablet, Take 1 tablet (50 mg total) by mouth daily as needed for Erectile Dysfunction., Disp: 15 tablet, Rfl: 1    valsartan (DIOVAN) 160 MG tablet, Take 1 tablet (160 mg total) by mouth once daily., Disp: 30 tablet, Rfl: 5    zolpidem (AMBIEN) 5 MG Tab, Take 1 tablet (5 mg total) by mouth every evening., Disp: 90 tablet, Rfl: 1    rosuvastatin (CRESTOR) 5 MG tablet, Take 1 tablet (5 mg total) by mouth once daily., Disp:  "90 tablet, Rfl: 3    Review of Systems   Constitutional:  Negative for fatigue, fever and unexpected weight change.   HENT:  Negative for ear pain, sinus pressure and sore throat.    Eyes:  Negative for pain.   Respiratory:  Negative for cough and shortness of breath.    Cardiovascular:  Negative for chest pain and leg swelling.   Gastrointestinal:  Negative for abdominal pain, constipation, nausea and vomiting.   Genitourinary:  Negative for dysuria, frequency and urgency.   Musculoskeletal:  Negative for arthralgias.   Skin:  Negative for rash.   Neurological:  Negative for dizziness, weakness and headaches.   Psychiatric/Behavioral:  Negative for sleep disturbance.         Objective:      Vitals:    03/27/23 0900   BP: 136/82   Pulse: 84   Resp: 18   SpO2: 98%   Weight: 92.2 kg (203 lb 3.2 oz)   Height: 6' 1" (1.854 m)     Physical Exam  Vitals and nursing note reviewed.   Constitutional:       General: He is not in acute distress.     Appearance: Normal appearance. He is well-developed.   HENT:      Head: Normocephalic and atraumatic.      Right Ear: External ear normal.      Left Ear: External ear normal.   Eyes:      Pupils: Pupils are equal, round, and reactive to light.   Neck:      Trachea: No tracheal deviation.   Cardiovascular:      Rate and Rhythm: Normal rate and regular rhythm.      Heart sounds: No murmur heard.    No friction rub. No gallop.   Pulmonary:      Breath sounds: Normal breath sounds. No stridor. No wheezing or rales.   Abdominal:      Palpations: Abdomen is soft. There is no mass.      Tenderness: There is no abdominal tenderness.   Musculoskeletal:         General: No tenderness or deformity.      Cervical back: Neck supple.   Lymphadenopathy:      Cervical: No cervical adenopathy.   Skin:     General: Skin is warm and dry.   Neurological:      Mental Status: He is alert and oriented to person, place, and time.      Coordination: Coordination normal.   Psychiatric:         Thought " Content: Thought content normal.         Assessment:       1. Hyperlipidemia, unspecified hyperlipidemia type    2. Primary insomnia    3. Recurrent major depressive disorder, in full remission    4. Gastroesophageal reflux disease, unspecified whether esophagitis present    5. Essential hypertension    6. High risk medication use         Plan:       Hyperlipidemia, unspecified hyperlipidemia type  -     Magnesium; Future; Expected date: 03/27/2023  -     rosuvastatin (CRESTOR) 5 MG tablet; Take 1 tablet (5 mg total) by mouth once daily.  Dispense: 90 tablet; Refill: 3    Primary insomnia    Recurrent major depressive disorder, in full remission    Gastroesophageal reflux disease, unspecified whether esophagitis present    Essential hypertension    High risk medication use      Follow up in about 6 months (around 9/27/2023), or if symptoms worsen or fail to improve, for medication management.        4/2/2023 Diann Arriaga

## 2023-03-28 LAB — MAGNESIUM SERPL-MCNC: 2.2 MG/DL (ref 1.5–2.5)

## 2023-04-02 PROBLEM — F33.42 RECURRENT MAJOR DEPRESSIVE DISORDER, IN FULL REMISSION: Status: ACTIVE | Noted: 2023-04-02

## 2023-04-02 PROBLEM — F10.20 UNCOMPLICATED ALCOHOL DEPENDENCE: Status: RESOLVED | Noted: 2022-06-26 | Resolved: 2023-04-02

## 2023-04-03 ENCOUNTER — TELEPHONE (OUTPATIENT)
Dept: FAMILY MEDICINE | Facility: CLINIC | Age: 63
End: 2023-04-03

## 2023-04-03 NOTE — TELEPHONE ENCOUNTER
----- Message from Diann Arriaga NP sent at 4/2/2023  4:49 AM CDT -----  Magnesium level is normal

## 2023-04-13 ENCOUNTER — PATIENT MESSAGE (OUTPATIENT)
Dept: FAMILY MEDICINE | Facility: CLINIC | Age: 63
End: 2023-04-13

## 2023-04-13 DIAGNOSIS — R53.83 FATIGUE, UNSPECIFIED TYPE: ICD-10-CM

## 2023-04-13 DIAGNOSIS — Z79.899 HIGH RISK MEDICATION USE: ICD-10-CM

## 2023-04-13 DIAGNOSIS — N52.2 DRUG-INDUCED ERECTILE DYSFUNCTION: Primary | ICD-10-CM

## 2023-04-17 ENCOUNTER — PATIENT MESSAGE (OUTPATIENT)
Dept: FAMILY MEDICINE | Facility: CLINIC | Age: 63
End: 2023-04-17

## 2023-04-20 ENCOUNTER — PATIENT MESSAGE (OUTPATIENT)
Dept: FAMILY MEDICINE | Facility: CLINIC | Age: 63
End: 2023-04-20

## 2023-04-20 DIAGNOSIS — F51.01 PRIMARY INSOMNIA: ICD-10-CM

## 2023-04-20 RX ORDER — ZOLPIDEM TARTRATE 5 MG/1
5 TABLET ORAL NIGHTLY
Qty: 90 TABLET | Refills: 1 | Status: SHIPPED | OUTPATIENT
Start: 2023-04-20 | End: 2023-10-15 | Stop reason: SDUPTHER

## 2023-04-23 LAB
TESTOST FREE SERPL-MCNC: 65.1 PG/ML (ref 46–224)
TESTOST SERPL-MCNC: 524 NG/DL (ref 250–827)

## 2023-04-24 ENCOUNTER — PATIENT MESSAGE (OUTPATIENT)
Dept: FAMILY MEDICINE | Facility: CLINIC | Age: 63
End: 2023-04-24

## 2023-05-18 ENCOUNTER — PATIENT MESSAGE (OUTPATIENT)
Dept: FAMILY MEDICINE | Facility: CLINIC | Age: 63
End: 2023-05-18

## 2023-05-18 DIAGNOSIS — F33.42 RECURRENT MAJOR DEPRESSIVE DISORDER, IN FULL REMISSION: ICD-10-CM

## 2023-05-18 RX ORDER — CITALOPRAM 20 MG/1
20 TABLET, FILM COATED ORAL DAILY
Qty: 90 TABLET | Refills: 1 | Status: SHIPPED | OUTPATIENT
Start: 2023-05-18 | End: 2023-10-31 | Stop reason: SDUPTHER

## 2023-05-19 ENCOUNTER — PATIENT MESSAGE (OUTPATIENT)
Dept: FAMILY MEDICINE | Facility: CLINIC | Age: 63
End: 2023-05-19

## 2023-07-11 ENCOUNTER — TELEPHONE (OUTPATIENT)
Dept: FAMILY MEDICINE | Facility: CLINIC | Age: 63
End: 2023-07-11

## 2023-07-11 NOTE — TELEPHONE ENCOUNTER
Spoke with pt in regards to message sent. Pt states that he needs to rescheduled his 3 month check up appointment. Pt had to cancel, because he was out of town. Appointment /scheduled for 07/27/2023. Pt acknowledged understanding.

## 2023-07-27 ENCOUNTER — OFFICE VISIT (OUTPATIENT)
Dept: FAMILY MEDICINE | Facility: CLINIC | Age: 63
End: 2023-07-27
Payer: COMMERCIAL

## 2023-07-27 VITALS
HEIGHT: 73 IN | HEART RATE: 72 BPM | WEIGHT: 209 LBS | DIASTOLIC BLOOD PRESSURE: 72 MMHG | BODY MASS INDEX: 27.7 KG/M2 | SYSTOLIC BLOOD PRESSURE: 124 MMHG

## 2023-07-27 DIAGNOSIS — M25.559 HIP PAIN, UNSPECIFIED LATERALITY: ICD-10-CM

## 2023-07-27 DIAGNOSIS — E78.5 HYPERLIPIDEMIA, UNSPECIFIED HYPERLIPIDEMIA TYPE: ICD-10-CM

## 2023-07-27 DIAGNOSIS — F51.01 PRIMARY INSOMNIA: ICD-10-CM

## 2023-07-27 DIAGNOSIS — I10 ESSENTIAL HYPERTENSION: ICD-10-CM

## 2023-07-27 DIAGNOSIS — R97.20 ELEVATED PSA: Primary | ICD-10-CM

## 2023-07-27 DIAGNOSIS — K21.9 GASTROESOPHAGEAL REFLUX DISEASE, UNSPECIFIED WHETHER ESOPHAGITIS PRESENT: ICD-10-CM

## 2023-07-27 PROCEDURE — 3074F SYST BP LT 130 MM HG: CPT | Mod: CPTII,S$GLB,, | Performed by: NURSE PRACTITIONER

## 2023-07-27 PROCEDURE — 3008F PR BODY MASS INDEX (BMI) DOCUMENTED: ICD-10-PCS | Mod: CPTII,S$GLB,, | Performed by: NURSE PRACTITIONER

## 2023-07-27 PROCEDURE — 3078F PR MOST RECENT DIASTOLIC BLOOD PRESSURE < 80 MM HG: ICD-10-PCS | Mod: CPTII,S$GLB,, | Performed by: NURSE PRACTITIONER

## 2023-07-27 PROCEDURE — 4010F ACE/ARB THERAPY RXD/TAKEN: CPT | Mod: CPTII,S$GLB,, | Performed by: NURSE PRACTITIONER

## 2023-07-27 PROCEDURE — 99214 PR OFFICE/OUTPT VISIT, EST, LEVL IV, 30-39 MIN: ICD-10-PCS | Mod: S$GLB,,, | Performed by: NURSE PRACTITIONER

## 2023-07-27 PROCEDURE — 3061F NEG MICROALBUMINURIA REV: CPT | Mod: CPTII,S$GLB,, | Performed by: NURSE PRACTITIONER

## 2023-07-27 PROCEDURE — 3061F PR NEG MICROALBUMINURIA RESULT DOCUMENTED/REVIEW: ICD-10-PCS | Mod: CPTII,S$GLB,, | Performed by: NURSE PRACTITIONER

## 2023-07-27 PROCEDURE — 3078F DIAST BP <80 MM HG: CPT | Mod: CPTII,S$GLB,, | Performed by: NURSE PRACTITIONER

## 2023-07-27 PROCEDURE — 99214 OFFICE O/P EST MOD 30 MIN: CPT | Mod: S$GLB,,, | Performed by: NURSE PRACTITIONER

## 2023-07-27 PROCEDURE — 1160F RVW MEDS BY RX/DR IN RCRD: CPT | Mod: CPTII,S$GLB,, | Performed by: NURSE PRACTITIONER

## 2023-07-27 PROCEDURE — 3066F NEPHROPATHY DOC TX: CPT | Mod: CPTII,S$GLB,, | Performed by: NURSE PRACTITIONER

## 2023-07-27 PROCEDURE — 4010F PR ACE/ARB THEARPY RXD/TAKEN: ICD-10-PCS | Mod: CPTII,S$GLB,, | Performed by: NURSE PRACTITIONER

## 2023-07-27 PROCEDURE — 3008F BODY MASS INDEX DOCD: CPT | Mod: CPTII,S$GLB,, | Performed by: NURSE PRACTITIONER

## 2023-07-27 PROCEDURE — 1159F MED LIST DOCD IN RCRD: CPT | Mod: CPTII,S$GLB,, | Performed by: NURSE PRACTITIONER

## 2023-07-27 PROCEDURE — 3074F PR MOST RECENT SYSTOLIC BLOOD PRESSURE < 130 MM HG: ICD-10-PCS | Mod: CPTII,S$GLB,, | Performed by: NURSE PRACTITIONER

## 2023-07-27 PROCEDURE — 3066F PR DOCUMENTATION OF TREATMENT FOR NEPHROPATHY: ICD-10-PCS | Mod: CPTII,S$GLB,, | Performed by: NURSE PRACTITIONER

## 2023-07-27 PROCEDURE — 1160F PR REVIEW ALL MEDS BY PRESCRIBER/CLIN PHARMACIST DOCUMENTED: ICD-10-PCS | Mod: CPTII,S$GLB,, | Performed by: NURSE PRACTITIONER

## 2023-07-27 PROCEDURE — 1159F PR MEDICATION LIST DOCUMENTED IN MEDICAL RECORD: ICD-10-PCS | Mod: CPTII,S$GLB,, | Performed by: NURSE PRACTITIONER

## 2023-07-27 NOTE — PROGRESS NOTES
SUBJECTIVE:    Patient ID: Jean Bates is a 63 y.o. male.    Chief Complaint: Follow-up (3mth, went over meds verbally// SW)    62 year old male  presents for check up. He is treated for anxiety and depression ed, insomnia, gerd, htn and hyperlipidemia. Bp in office is well controlled. Does not check at home. Sleeps ok with ambien. Last labs were in march. Psa due to be check in October. Interested in seeing pain management . Has hip pain. Would like to discuss medical marijuana.         Patient Message on 04/13/2023   Component Date Value Ref Range Status    TESTOSTERONE, TOTAL, MALE 04/20/2023 524  250 - 827 ng/dL Final    Testosterone, Free 04/20/2023 65.1  46.0 - 224.0 pg/mL Final   Office Visit on 03/27/2023   Component Date Value Ref Range Status    Magnesium 03/27/2023 2.2  1.5 - 2.5 mg/dL Final   Telephone on 03/17/2023   Component Date Value Ref Range Status    WBC 03/22/2023 6.4  3.8 - 10.8 Thousand/uL Final    RBC 03/22/2023 5.17  4.20 - 5.80 Million/uL Final    Hemoglobin 03/22/2023 16.6  13.2 - 17.1 g/dL Final    Hematocrit 03/22/2023 47.4  38.5 - 50.0 % Final    MCV 03/22/2023 91.7  80.0 - 100.0 fL Final    MCH 03/22/2023 32.1  27.0 - 33.0 pg Final    MCHC 03/22/2023 35.0  32.0 - 36.0 g/dL Final    RDW 03/22/2023 12.5  11.0 - 15.0 % Final    Platelets 03/22/2023 271  140 - 400 Thousand/uL Final    MPV 03/22/2023 9.9  7.5 - 12.5 fL Final    Neutrophils, Abs 03/22/2023 3,206  1,500 - 7,800 cells/uL Final    Lymph # 03/22/2023 2,176  850 - 3,900 cells/uL Final    Mono # 03/22/2023 704  200 - 950 cells/uL Final    Eos # 03/22/2023 243  15 - 500 cells/uL Final    Baso # 03/22/2023 70  0 - 200 cells/uL Final    Neutrophils Relative 03/22/2023 50.1  % Final    Lymph % 03/22/2023 34.0  % Final    Mono % 03/22/2023 11.0  % Final    Eosinophil % 03/22/2023 3.8  % Final    Basophil % 03/22/2023 1.1  % Final    Glucose 03/22/2023 75  65 - 139 mg/dL Final    BUN 03/22/2023 14  7 - 25 mg/dL Final    Creatinine  03/22/2023 0.97  0.70 - 1.35 mg/dL Final    eGFR 03/22/2023 89  > OR = 60 mL/min/1.73m2 Final    BUN/Creatinine Ratio 03/22/2023 NOT APPLICABLE  6 - 22 (calc) Final    Sodium 03/22/2023 138  135 - 146 mmol/L Final    Potassium 03/22/2023 5.2  3.5 - 5.3 mmol/L Final    Chloride 03/22/2023 102  98 - 110 mmol/L Final    CO2 03/22/2023 28  20 - 32 mmol/L Final    Calcium 03/22/2023 9.5  8.6 - 10.3 mg/dL Final    Total Protein 03/22/2023 6.3  6.1 - 8.1 g/dL Final    Albumin 03/22/2023 4.3  3.6 - 5.1 g/dL Final    Globulin, Total 03/22/2023 2.0  1.9 - 3.7 g/dL (calc) Final    Albumin/Globulin Ratio 03/22/2023 2.2  1.0 - 2.5 (calc) Final    Total Bilirubin 03/22/2023 0.8  0.2 - 1.2 mg/dL Final    Alkaline Phosphatase 03/22/2023 63  35 - 144 U/L Final    AST 03/22/2023 20  10 - 35 U/L Final    ALT 03/22/2023 20  9 - 46 U/L Final    Cholesterol 03/22/2023 206 (H)  <200 mg/dL Final    HDL 03/22/2023 47  > OR = 40 mg/dL Final    Triglycerides 03/22/2023 246 (H)  <150 mg/dL Final    LDL Cholesterol 03/22/2023 122 (H)  mg/dL (calc) Final    HDL/Cholesterol Ratio 03/22/2023 4.4  <5.0 (calc) Final    Non HDL Chol. (LDL+VLDL) 03/22/2023 159 (H)  <130 mg/dL (calc) Final    Color, UA 03/22/2023 YELLOW  YELLOW Final    Appearance, UA 03/22/2023 CLEAR  CLEAR Final    Specific Gravity, UA 03/22/2023 1.016  1.001 - 1.035 Final    pH, UA 03/22/2023 7.0  5.0 - 8.0 Final    Glucose, UA 03/22/2023 NEGATIVE  NEGATIVE Final    Bilirubin, UA 03/22/2023 NEGATIVE  NEGATIVE Final    Ketones, UA 03/22/2023 NEGATIVE  NEGATIVE Final    Occult Blood UA 03/22/2023 NEGATIVE  NEGATIVE Final    Protein, UA 03/22/2023 NEGATIVE  NEGATIVE Final    Nitrite, UA 03/22/2023 NEGATIVE  NEGATIVE Final    Leukocytes, UA 03/22/2023 NEGATIVE  NEGATIVE Final    WBC Casts, UA 03/22/2023 NONE SEEN  < OR = 5 /HPF Final    RBC Casts, UA 03/22/2023 NONE SEEN  < OR = 2 /HPF Final    Squam Epithel, UA 03/22/2023 NONE SEEN  < OR = 5 /HPF Final    Bacteria, UA 03/22/2023 NONE  SEEN  NONE SEEN /HPF Final    Hyaline Casts, UA 03/22/2023 NONE SEEN  NONE SEEN /LPF Final    Service Cmt: 03/22/2023    Final    Reflexive Urine Culture 03/22/2023    Final    TSH w/reflex to FT4 03/22/2023 1.51  0.40 - 4.50 mIU/L Final    Creatinine, Urine 03/22/2023 97  20 - 320 mg/dL Final    Microalb, Ur 03/22/2023 <0.2  See Note: mg/dL Final    Microalb/Creat Ratio 03/22/2023 NOTE  <30 mcg/mg creat Final       Past Medical History:   Diagnosis Date    Depression     GERD (gastroesophageal reflux disease)      Social History     Socioeconomic History    Marital status:    Tobacco Use    Smoking status: Never    Smokeless tobacco: Never   Substance and Sexual Activity    Alcohol use: Not Currently     Alcohol/week: 4.0 standard drinks of alcohol     Types: 4 Cans of beer per week     Comment: Drinks 2-4 beers daily    Drug use: Never    Sexual activity: Yes     Partners: Female     Past Surgical History:   Procedure Laterality Date    HERNIA REPAIR       Family History   Problem Relation Age of Onset    Hypertension Mother     Heart disease Mother     Alzheimer's disease Mother     No Known Problems Father     Kidney disease Brother         Renal stones       Review of patient's allergies indicates:  No Known Allergies    Current Outpatient Medications:     buPROPion (WELLBUTRIN SR) 150 MG TBSR 12 hr tablet, Take 1 tablet (150 mg total) by mouth 2 (two) times daily., Disp: 60 tablet, Rfl: 5    citalopram (CELEXA) 20 MG tablet, Take 1 tablet (20 mg total) by mouth once daily., Disp: 90 tablet, Rfl: 1    omeprazole (PRILOSEC) 20 MG capsule, Take 1 capsule (20 mg total) by mouth once daily., Disp: 180 capsule, Rfl: 1    rosuvastatin (CRESTOR) 5 MG tablet, Take 1 tablet (5 mg total) by mouth once daily., Disp: 90 tablet, Rfl: 3    sildenafiL (VIAGRA) 50 MG tablet, Take 1 tablet (50 mg total) by mouth daily as needed for Erectile Dysfunction., Disp: 15 tablet, Rfl: 1    valsartan (DIOVAN) 160 MG tablet, Take 1  "tablet (160 mg total) by mouth once daily., Disp: 30 tablet, Rfl: 5    zolpidem (AMBIEN) 5 MG Tab, Take 1 tablet (5 mg total) by mouth every evening., Disp: 90 tablet, Rfl: 1    Review of Systems   Constitutional:  Negative for fatigue, fever and unexpected weight change.   HENT:  Negative for ear pain, sinus pressure and sore throat.    Eyes:  Negative for pain.   Respiratory:  Negative for cough and shortness of breath.    Cardiovascular:  Negative for chest pain and leg swelling.   Gastrointestinal:  Negative for abdominal pain, constipation, nausea and vomiting.   Genitourinary:  Negative for dysuria, frequency and urgency.   Musculoskeletal:  Positive for arthralgias.   Skin:  Negative for rash.   Neurological:  Negative for dizziness, weakness and headaches.   Psychiatric/Behavioral:  Negative for sleep disturbance.           Objective:      Vitals:    07/27/23 0922   BP: 124/72   Pulse: 72   Weight: 94.8 kg (209 lb)   Height: 6' 1" (1.854 m)     Physical Exam  Vitals and nursing note reviewed.   Constitutional:       General: He is not in acute distress.     Appearance: Normal appearance. He is well-developed.   HENT:      Head: Normocephalic and atraumatic.      Right Ear: External ear normal.      Left Ear: External ear normal.   Eyes:      Pupils: Pupils are equal, round, and reactive to light.   Neck:      Trachea: No tracheal deviation.   Cardiovascular:      Rate and Rhythm: Normal rate and regular rhythm.      Heart sounds: No murmur heard.     No friction rub. No gallop.   Pulmonary:      Breath sounds: Normal breath sounds. No stridor. No wheezing or rales.   Abdominal:      Palpations: Abdomen is soft. There is no mass.      Tenderness: There is no abdominal tenderness.   Musculoskeletal:         General: No tenderness or deformity.      Cervical back: Neck supple.      Lumbar back: Decreased range of motion.      Right hip: Decreased range of motion.      Left hip: Decreased range of motion. "   Lymphadenopathy:      Cervical: No cervical adenopathy.   Skin:     General: Skin is warm and dry.   Neurological:      Mental Status: He is alert and oriented to person, place, and time.      Coordination: Coordination normal.   Psychiatric:         Thought Content: Thought content normal.           Assessment:       1. Elevated PSA    2. Hip pain, unspecified laterality    3. Hyperlipidemia, unspecified hyperlipidemia type    4. Essential hypertension    5. Gastroesophageal reflux disease, unspecified whether esophagitis present    6. Primary insomnia         Plan:       Elevated PSA  -     PSA, Screening; Future; Expected date: 07/27/2023    Hip pain, unspecified laterality  Comments:  refer to pain management  Orders:  -     Ambulatory referral/consult to Pain Clinic; Future; Expected date: 08/03/2023    Hyperlipidemia, unspecified hyperlipidemia type  Comments:  crestor    Essential hypertension  Comments:  bp is well controlled    Gastroesophageal reflux disease, unspecified whether esophagitis present  Comments:  prilosec controlling symptoms    Primary insomnia  Comments:  ambien for sleep      No follow-ups on file.        8/7/2023 Diann Arriaga

## 2023-08-21 DIAGNOSIS — F33.42 RECURRENT MAJOR DEPRESSIVE DISORDER, IN FULL REMISSION: ICD-10-CM

## 2023-08-21 RX ORDER — BUPROPION HYDROCHLORIDE 150 MG/1
150 TABLET, EXTENDED RELEASE ORAL 2 TIMES DAILY
Qty: 60 TABLET | Refills: 5 | Status: SHIPPED | OUTPATIENT
Start: 2023-08-21 | End: 2023-10-15 | Stop reason: SDUPTHER

## 2023-09-19 RX ORDER — VALSARTAN 160 MG/1
160 TABLET ORAL DAILY
Qty: 30 TABLET | Refills: 5 | Status: SHIPPED | OUTPATIENT
Start: 2023-09-19 | End: 2023-10-15 | Stop reason: SDUPTHER

## 2023-10-12 ENCOUNTER — TELEPHONE (OUTPATIENT)
Dept: FAMILY MEDICINE | Facility: CLINIC | Age: 63
End: 2023-10-12

## 2023-10-12 NOTE — TELEPHONE ENCOUNTER
----- Message from Dara Chadwick sent at 10/12/2023 10:59 AM CDT -----  Pt has a lump on his neck and would like to be seen for it   [phone]

## 2023-10-12 NOTE — TELEPHONE ENCOUNTER
----- Message from Sherry Mallory sent at 10/12/2023  5:01 PM CDT -----    4:48 Returning a call pt's [phone] GH

## 2023-10-12 NOTE — TELEPHONE ENCOUNTER
Spoke with patient, states he just noticed a painful lump on his neck yesterday. States it's about as big as a dime. Not red around it. States it hurts even when he turns his neck. Right under the jaw  to the ear, not mobile. He can't tell if it's a swollen lymph node or a cyst.

## 2023-10-12 NOTE — TELEPHONE ENCOUNTER
LMOR for patient to call back - trying to see if he can take that early morning ov with rosetta tomorrow.

## 2023-10-13 ENCOUNTER — OFFICE VISIT (OUTPATIENT)
Dept: FAMILY MEDICINE | Facility: CLINIC | Age: 63
End: 2023-10-13
Payer: COMMERCIAL

## 2023-10-13 ENCOUNTER — PATIENT MESSAGE (OUTPATIENT)
Dept: FAMILY MEDICINE | Facility: CLINIC | Age: 63
End: 2023-10-13

## 2023-10-13 VITALS
BODY MASS INDEX: 28.07 KG/M2 | DIASTOLIC BLOOD PRESSURE: 78 MMHG | SYSTOLIC BLOOD PRESSURE: 136 MMHG | OXYGEN SATURATION: 97 % | HEART RATE: 73 BPM | HEIGHT: 73 IN | WEIGHT: 211.81 LBS

## 2023-10-13 DIAGNOSIS — K21.9 GASTROESOPHAGEAL REFLUX DISEASE, UNSPECIFIED WHETHER ESOPHAGITIS PRESENT: ICD-10-CM

## 2023-10-13 DIAGNOSIS — R59.0 ANTERIOR CERVICAL ADENOPATHY: Primary | ICD-10-CM

## 2023-10-13 PROCEDURE — 3061F PR NEG MICROALBUMINURIA RESULT DOCUMENTED/REVIEW: ICD-10-PCS | Mod: CPTII,S$GLB,, | Performed by: PHYSICIAN ASSISTANT

## 2023-10-13 PROCEDURE — 1159F PR MEDICATION LIST DOCUMENTED IN MEDICAL RECORD: ICD-10-PCS | Mod: CPTII,S$GLB,, | Performed by: PHYSICIAN ASSISTANT

## 2023-10-13 PROCEDURE — 3066F PR DOCUMENTATION OF TREATMENT FOR NEPHROPATHY: ICD-10-PCS | Mod: CPTII,S$GLB,, | Performed by: PHYSICIAN ASSISTANT

## 2023-10-13 PROCEDURE — 4010F ACE/ARB THERAPY RXD/TAKEN: CPT | Mod: CPTII,S$GLB,, | Performed by: PHYSICIAN ASSISTANT

## 2023-10-13 PROCEDURE — 3061F NEG MICROALBUMINURIA REV: CPT | Mod: CPTII,S$GLB,, | Performed by: PHYSICIAN ASSISTANT

## 2023-10-13 PROCEDURE — 99213 PR OFFICE/OUTPT VISIT, EST, LEVL III, 20-29 MIN: ICD-10-PCS | Mod: S$GLB,,, | Performed by: PHYSICIAN ASSISTANT

## 2023-10-13 PROCEDURE — 3066F NEPHROPATHY DOC TX: CPT | Mod: CPTII,S$GLB,, | Performed by: PHYSICIAN ASSISTANT

## 2023-10-13 PROCEDURE — 3078F PR MOST RECENT DIASTOLIC BLOOD PRESSURE < 80 MM HG: ICD-10-PCS | Mod: CPTII,S$GLB,, | Performed by: PHYSICIAN ASSISTANT

## 2023-10-13 PROCEDURE — 1159F MED LIST DOCD IN RCRD: CPT | Mod: CPTII,S$GLB,, | Performed by: PHYSICIAN ASSISTANT

## 2023-10-13 PROCEDURE — 3075F SYST BP GE 130 - 139MM HG: CPT | Mod: CPTII,S$GLB,, | Performed by: PHYSICIAN ASSISTANT

## 2023-10-13 PROCEDURE — 3075F PR MOST RECENT SYSTOLIC BLOOD PRESS GE 130-139MM HG: ICD-10-PCS | Mod: CPTII,S$GLB,, | Performed by: PHYSICIAN ASSISTANT

## 2023-10-13 PROCEDURE — 3008F PR BODY MASS INDEX (BMI) DOCUMENTED: ICD-10-PCS | Mod: CPTII,S$GLB,, | Performed by: PHYSICIAN ASSISTANT

## 2023-10-13 PROCEDURE — 99213 OFFICE O/P EST LOW 20 MIN: CPT | Mod: S$GLB,,, | Performed by: PHYSICIAN ASSISTANT

## 2023-10-13 PROCEDURE — 3078F DIAST BP <80 MM HG: CPT | Mod: CPTII,S$GLB,, | Performed by: PHYSICIAN ASSISTANT

## 2023-10-13 PROCEDURE — 4010F PR ACE/ARB THEARPY RXD/TAKEN: ICD-10-PCS | Mod: CPTII,S$GLB,, | Performed by: PHYSICIAN ASSISTANT

## 2023-10-13 PROCEDURE — 3008F BODY MASS INDEX DOCD: CPT | Mod: CPTII,S$GLB,, | Performed by: PHYSICIAN ASSISTANT

## 2023-10-13 RX ORDER — AMOXICILLIN AND CLAVULANATE POTASSIUM 875; 125 MG/1; MG/1
1 TABLET, FILM COATED ORAL EVERY 12 HOURS
Qty: 20 TABLET | Refills: 0 | Status: SHIPPED | OUTPATIENT
Start: 2023-10-13 | End: 2023-10-23

## 2023-10-13 RX ORDER — OMEPRAZOLE 20 MG/1
20 CAPSULE, DELAYED RELEASE ORAL DAILY
Qty: 180 CAPSULE | Refills: 1 | Status: SHIPPED | OUTPATIENT
Start: 2023-10-13 | End: 2023-10-15 | Stop reason: SDUPTHER

## 2023-10-13 NOTE — PROGRESS NOTES
SUBJECTIVE:    Patient ID: Jean Bates is a 63 y.o. male.    Chief Complaint: Mass (No bottles//pt c/o of lump on right side of neck x 2 days, painful but not as painful from 2 days ago//decline flu vaccine-SC)    This is a 63-year-old male who presents today for urgent evaluation of a lump to right side of neck.  Came on 2 days prior and was initially painful but not so much now.  About the same size and not improving there.  Close by he has a SEB keratosis that was seen by Dermatology earlier this year.  He denies any fever or chills.  He has no tooth pain.  He has no upper respiratory symptoms to note at this time.  He is not had any weight loss, night sweats or other concerning symptoms to note.        No visits with results within 6 Month(s) from this visit.   Latest known visit with results is:   Patient Message on 04/13/2023   Component Date Value Ref Range Status    TESTOSTERONE, TOTAL, MALE 04/20/2023 524  250 - 827 ng/dL Final    Testosterone, Free 04/20/2023 65.1  46.0 - 224.0 pg/mL Final       Past Medical History:   Diagnosis Date    Depression     GERD (gastroesophageal reflux disease)      Past Surgical History:   Procedure Laterality Date    HERNIA REPAIR       Family History   Problem Relation Age of Onset    Hypertension Mother     Heart disease Mother     Alzheimer's disease Mother     No Known Problems Father     Kidney disease Brother         Renal stones       Marital Status:   Alcohol History:  reports that he does not currently use alcohol after a past usage of about 4.0 standard drinks of alcohol per week.  Tobacco History:  reports that he has never smoked. He has never used smokeless tobacco.  Drug History:  reports no history of drug use.    Review of patient's allergies indicates:  No Known Allergies    Current Outpatient Medications:     amoxicillin-clavulanate 875-125mg (AUGMENTIN) 875-125 mg per tablet, Take 1 tablet by mouth every 12 (twelve) hours. for 10 days, Disp: 20  "tablet, Rfl: 0    buPROPion (WELLBUTRIN SR) 150 MG TBSR 12 hr tablet, Take 1 tablet (150 mg total) by mouth 2 (two) times daily., Disp: 60 tablet, Rfl: 5    citalopram (CELEXA) 20 MG tablet, Take 1 tablet (20 mg total) by mouth once daily., Disp: 90 tablet, Rfl: 1    omeprazole (PRILOSEC) 20 MG capsule, Take 1 capsule (20 mg total) by mouth once daily., Disp: 180 capsule, Rfl: 1    rosuvastatin (CRESTOR) 5 MG tablet, Take 1 tablet (5 mg total) by mouth once daily., Disp: 90 tablet, Rfl: 3    sildenafiL (VIAGRA) 50 MG tablet, Take 1 tablet (50 mg total) by mouth daily as needed for Erectile Dysfunction., Disp: 15 tablet, Rfl: 1    valsartan (DIOVAN) 160 MG tablet, Take 1 tablet (160 mg total) by mouth once daily., Disp: 30 tablet, Rfl: 5    zolpidem (AMBIEN) 5 MG Tab, Take 1 tablet (5 mg total) by mouth every evening., Disp: 90 tablet, Rfl: 1    Review of Systems   Constitutional:  Negative for activity change, fatigue, fever and unexpected weight change.   HENT:  Negative for congestion.         Right anterior cervical adenopathy   Respiratory:  Negative for apnea, cough, chest tightness and shortness of breath.    Cardiovascular:  Negative for chest pain and palpitations.   Gastrointestinal:  Negative for abdominal distention and abdominal pain.   Genitourinary:  Negative for difficulty urinating and dysuria.   Musculoskeletal:  Negative for arthralgias and back pain.   Neurological:  Negative for dizziness and weakness.          Objective:      Vitals:    10/13/23 0721   BP: 136/78   Pulse: 73   SpO2: 97%   Weight: 96.1 kg (211 lb 12.8 oz)   Height: 6' 1" (1.854 m)     Physical Exam  Constitutional:       General: He is not in acute distress.     Appearance: He is well-developed.   HENT:      Head: Normocephalic and atraumatic.   Eyes:      Pupils: Pupils are equal, round, and reactive to light.   Neck:      Thyroid: No thyromegaly.   Cardiovascular:      Rate and Rhythm: Normal rate and regular rhythm.      Heart " sounds: Normal heart sounds.   Pulmonary:      Effort: Pulmonary effort is normal.      Breath sounds: Normal breath sounds.   Abdominal:      General: Bowel sounds are normal. There is no distension.      Palpations: Abdomen is soft.      Tenderness: There is no abdominal tenderness.   Musculoskeletal:         General: Normal range of motion.      Cervical back: Normal range of motion and neck supple.   Lymphadenopathy:      Cervical: Cervical adenopathy (Right anterior.  Slightly tender) present.   Skin:     General: Skin is warm and dry.      Findings: No erythema or rash.   Neurological:      Mental Status: He is alert and oriented to person, place, and time.      Cranial Nerves: No cranial nerve deficit.           Assessment:       1. Anterior cervical adenopathy         Plan:       Anterior cervical adenopathy  Comments:  Unclear etiology, reactive versus inflammatory?  Will treat with broad-spectrum antibiotic for next 10 days.  If persistent low threshold for ultrasound.  Orders:  -     amoxicillin-clavulanate 875-125mg (AUGMENTIN) 875-125 mg per tablet; Take 1 tablet by mouth every 12 (twelve) hours. for 10 days  Dispense: 20 tablet; Refill: 0      No follow-ups on file.        10/13/2023 Marcio Nevarez PA-C

## 2023-10-15 DIAGNOSIS — F51.01 PRIMARY INSOMNIA: ICD-10-CM

## 2023-10-15 DIAGNOSIS — F33.42 RECURRENT MAJOR DEPRESSIVE DISORDER, IN FULL REMISSION: ICD-10-CM

## 2023-10-15 DIAGNOSIS — K21.9 GASTROESOPHAGEAL REFLUX DISEASE, UNSPECIFIED WHETHER ESOPHAGITIS PRESENT: ICD-10-CM

## 2023-10-17 RX ORDER — VALSARTAN 160 MG/1
160 TABLET ORAL DAILY
Qty: 30 TABLET | Refills: 5 | Status: SHIPPED | OUTPATIENT
Start: 2023-10-17 | End: 2024-03-01 | Stop reason: SDUPTHER

## 2023-10-17 RX ORDER — ZOLPIDEM TARTRATE 5 MG/1
5 TABLET ORAL NIGHTLY
Qty: 90 TABLET | Refills: 1 | Status: SHIPPED | OUTPATIENT
Start: 2023-10-17

## 2023-10-17 RX ORDER — OMEPRAZOLE 20 MG/1
20 CAPSULE, DELAYED RELEASE ORAL DAILY
Qty: 180 CAPSULE | Refills: 1 | Status: SHIPPED | OUTPATIENT
Start: 2023-10-17 | End: 2024-02-22

## 2023-10-17 RX ORDER — BUPROPION HYDROCHLORIDE 150 MG/1
150 TABLET, EXTENDED RELEASE ORAL 2 TIMES DAILY
Qty: 60 TABLET | Refills: 5 | Status: SHIPPED | OUTPATIENT
Start: 2023-10-17 | End: 2023-11-17 | Stop reason: SDUPTHER

## 2023-10-31 DIAGNOSIS — F33.42 RECURRENT MAJOR DEPRESSIVE DISORDER, IN FULL REMISSION: ICD-10-CM

## 2023-11-01 RX ORDER — CITALOPRAM 20 MG/1
20 TABLET, FILM COATED ORAL DAILY
Qty: 90 TABLET | Refills: 1 | Status: SHIPPED | OUTPATIENT
Start: 2023-11-01 | End: 2024-02-27 | Stop reason: SDUPTHER

## 2023-11-17 DIAGNOSIS — F33.42 RECURRENT MAJOR DEPRESSIVE DISORDER, IN FULL REMISSION: ICD-10-CM

## 2023-11-18 RX ORDER — BUPROPION HYDROCHLORIDE 150 MG/1
150 TABLET, EXTENDED RELEASE ORAL 2 TIMES DAILY
Qty: 60 TABLET | Refills: 5 | Status: SHIPPED | OUTPATIENT
Start: 2023-11-18 | End: 2024-11-17

## 2024-01-15 DIAGNOSIS — E78.5 HYPERLIPIDEMIA, UNSPECIFIED HYPERLIPIDEMIA TYPE: ICD-10-CM

## 2024-01-17 ENCOUNTER — PATIENT MESSAGE (OUTPATIENT)
Dept: FAMILY MEDICINE | Facility: CLINIC | Age: 64
End: 2024-01-17
Payer: COMMERCIAL

## 2024-01-17 RX ORDER — ROSUVASTATIN CALCIUM 5 MG/1
5 TABLET, COATED ORAL DAILY
Qty: 90 TABLET | Refills: 3 | Status: SHIPPED | OUTPATIENT
Start: 2024-01-17 | End: 2025-01-16

## 2024-01-24 ENCOUNTER — OFFICE VISIT (OUTPATIENT)
Dept: FAMILY MEDICINE | Facility: CLINIC | Age: 64
End: 2024-01-24
Payer: COMMERCIAL

## 2024-01-24 VITALS
HEIGHT: 73 IN | DIASTOLIC BLOOD PRESSURE: 70 MMHG | BODY MASS INDEX: 28.36 KG/M2 | WEIGHT: 214 LBS | HEART RATE: 81 BPM | SYSTOLIC BLOOD PRESSURE: 132 MMHG | OXYGEN SATURATION: 96 %

## 2024-01-24 DIAGNOSIS — E78.5 HYPERLIPIDEMIA, UNSPECIFIED HYPERLIPIDEMIA TYPE: ICD-10-CM

## 2024-01-24 DIAGNOSIS — K21.9 GASTROESOPHAGEAL REFLUX DISEASE, UNSPECIFIED WHETHER ESOPHAGITIS PRESENT: ICD-10-CM

## 2024-01-24 DIAGNOSIS — R97.20 ELEVATED PSA: ICD-10-CM

## 2024-01-24 DIAGNOSIS — F51.01 PRIMARY INSOMNIA: Primary | ICD-10-CM

## 2024-01-24 DIAGNOSIS — F33.42 RECURRENT MAJOR DEPRESSIVE DISORDER, IN FULL REMISSION: ICD-10-CM

## 2024-01-24 DIAGNOSIS — I10 ESSENTIAL HYPERTENSION: ICD-10-CM

## 2024-01-24 PROCEDURE — 1160F RVW MEDS BY RX/DR IN RCRD: CPT | Mod: CPTII,S$GLB,, | Performed by: NURSE PRACTITIONER

## 2024-01-24 PROCEDURE — 3008F BODY MASS INDEX DOCD: CPT | Mod: CPTII,S$GLB,, | Performed by: NURSE PRACTITIONER

## 2024-01-24 PROCEDURE — 3078F DIAST BP <80 MM HG: CPT | Mod: CPTII,S$GLB,, | Performed by: NURSE PRACTITIONER

## 2024-01-24 PROCEDURE — 99214 OFFICE O/P EST MOD 30 MIN: CPT | Mod: S$GLB,,, | Performed by: NURSE PRACTITIONER

## 2024-01-24 PROCEDURE — 1159F MED LIST DOCD IN RCRD: CPT | Mod: CPTII,S$GLB,, | Performed by: NURSE PRACTITIONER

## 2024-01-24 PROCEDURE — 3075F SYST BP GE 130 - 139MM HG: CPT | Mod: CPTII,S$GLB,, | Performed by: NURSE PRACTITIONER

## 2024-01-24 NOTE — PROGRESS NOTES
SUBJECTIVE:    Patient ID: Jean Bates is a 63 y.o. male.    Chief Complaint: Follow-up (No bottles, no complaints, declined flu //BA )    63 year old male  presents for check up. He is treated for anxiety and depression ed, insomnia, gerd, htn and hyperlipidemia. Bp in office is well controlled. Does not check at home. Sleeps ok with ambien.last labs were in April. Sees dr. Hilario . Recent visit in December. Also sees dr. bernard. Has hip pain. Would like to discuss medical marijuana.         No visits with results within 6 Month(s) from this visit.   Latest known visit with results is:   Patient Message on 04/13/2023   Component Date Value Ref Range Status    TESTOSTERONE, TOTAL, MALE 04/20/2023 524  250 - 827 ng/dL Final    Testosterone, Free 04/20/2023 65.1  46.0 - 224.0 pg/mL Final       Past Medical History:   Diagnosis Date    Depression     GERD (gastroesophageal reflux disease)      Social History     Socioeconomic History    Marital status:    Tobacco Use    Smoking status: Never    Smokeless tobacco: Never   Substance and Sexual Activity    Alcohol use: Not Currently     Alcohol/week: 4.0 standard drinks of alcohol     Types: 4 Cans of beer per week     Comment: Drinks 2-4 beers daily    Drug use: Never    Sexual activity: Yes     Partners: Female     Past Surgical History:   Procedure Laterality Date    HERNIA REPAIR       Family History   Problem Relation Age of Onset    Hypertension Mother     Heart disease Mother     Alzheimer's disease Mother     No Known Problems Father     Kidney disease Brother         Renal stones       All of your core healthy metrics are met.      Review of patient's allergies indicates:  No Known Allergies    Current Outpatient Medications:     buPROPion (WELLBUTRIN SR) 150 MG TBSR 12 hr tablet, Take 1 tablet (150 mg total) by mouth 2 (two) times daily., Disp: 60 tablet, Rfl: 5    citalopram (CELEXA) 20 MG tablet, Take 1 tablet (20 mg total) by mouth once  "daily., Disp: 90 tablet, Rfl: 1    omeprazole (PRILOSEC) 20 MG capsule, Take 1 capsule (20 mg total) by mouth once daily., Disp: 180 capsule, Rfl: 1    rosuvastatin (CRESTOR) 5 MG tablet, Take 1 tablet (5 mg total) by mouth once daily., Disp: 90 tablet, Rfl: 3    sildenafiL (VIAGRA) 50 MG tablet, Take 1 tablet (50 mg total) by mouth daily as needed for Erectile Dysfunction., Disp: 15 tablet, Rfl: 1    valsartan (DIOVAN) 160 MG tablet, Take 1 tablet (160 mg total) by mouth once daily., Disp: 30 tablet, Rfl: 5    zolpidem (AMBIEN) 5 MG Tab, Take 1 tablet (5 mg total) by mouth every evening., Disp: 90 tablet, Rfl: 1    Review of Systems   Constitutional:  Negative for fatigue, fever and unexpected weight change.   HENT:  Negative for ear pain, sinus pressure and sore throat.    Eyes:  Negative for pain.   Respiratory:  Negative for cough and shortness of breath.    Cardiovascular:  Negative for chest pain and leg swelling.   Gastrointestinal:  Negative for abdominal pain, constipation, nausea and vomiting.   Genitourinary:  Negative for dysuria, frequency and urgency.   Musculoskeletal:  Negative for arthralgias.   Skin:  Negative for rash.   Neurological:  Negative for dizziness, weakness and headaches.   Psychiatric/Behavioral:  Negative for sleep disturbance.           Objective:      Vitals:    01/24/24 0920   BP: 132/70   Pulse: 81   SpO2: 96%   Weight: 97.1 kg (214 lb)   Height: 6' 1" (1.854 m)     Physical Exam  Vitals and nursing note reviewed.   Constitutional:       General: He is not in acute distress.     Appearance: Normal appearance. He is well-developed.   HENT:      Head: Normocephalic and atraumatic.      Right Ear: External ear normal.      Left Ear: External ear normal.   Eyes:      Pupils: Pupils are equal, round, and reactive to light.   Neck:      Trachea: No tracheal deviation.   Cardiovascular:      Rate and Rhythm: Normal rate and regular rhythm.      Heart sounds: No murmur heard.     No " friction rub. No gallop.   Pulmonary:      Breath sounds: Normal breath sounds. No stridor. No wheezing or rales.   Abdominal:      Palpations: Abdomen is soft. There is no mass.      Tenderness: There is no abdominal tenderness.   Musculoskeletal:         General: No tenderness or deformity.      Cervical back: Neck supple.   Lymphadenopathy:      Cervical: No cervical adenopathy.   Skin:     General: Skin is warm and dry.   Neurological:      Mental Status: He is alert and oriented to person, place, and time.      Coordination: Coordination normal.   Psychiatric:         Thought Content: Thought content normal.           Assessment:       1. Primary insomnia    2. Recurrent major depressive disorder, in full remission    3. Essential hypertension    4. Hyperlipidemia, unspecified hyperlipidemia type    5. Elevated PSA    6. Gastroesophageal reflux disease, unspecified whether esophagitis present         Plan:       Primary insomnia  Comments:  ambien    Recurrent major depressive disorder, in full remission  Comments:  wellbutrin    Essential hypertension  Comments:  bp well controlled    Hyperlipidemia, unspecified hyperlipidemia type  Comments:  crestor    Elevated PSA  Comments:  dr. briceno    Gastroesophageal reflux disease, unspecified whether esophagitis present  Comments:  prilosec      Follow up in about 6 months (around 7/24/2024), or if symptoms worsen or fail to improve, for medication management.        2/13/2024 Diann Arriaga

## 2024-02-21 ENCOUNTER — TELEPHONE (OUTPATIENT)
Dept: FAMILY MEDICINE | Facility: CLINIC | Age: 64
End: 2024-02-21
Payer: COMMERCIAL

## 2024-02-21 NOTE — TELEPHONE ENCOUNTER
----- Message from Dara Chadwick sent at 2/21/2024  1:50 PM CST -----  - 1:27- pt would like to be seen asap for pain in his chest area. Feels like someone has punched him. When he takes a deep breath or bends over   [phone]

## 2024-02-21 NOTE — TELEPHONE ENCOUNTER
Spoke to pt and he stated his chest has been hurting for the past 2 weeks. It comes and goes. Some times if he takes a deep breathe or bends over it hurts and some hurts like he's been punched in the chest. Pt stated he went to Dr. Recinos for a different type of chest pain and had an Echo and nothing was found. Pt stated this is new different from other chest pains he has experienced. Pt has not tried anything over the counter. Pt stated when he wakes up in the morning it is gone but comes back through out the day     Spoke to Diann. Okay for an appointment tomorrow, pt has been notified and is scheduled

## 2024-02-22 ENCOUNTER — OFFICE VISIT (OUTPATIENT)
Dept: FAMILY MEDICINE | Facility: CLINIC | Age: 64
End: 2024-02-22
Payer: COMMERCIAL

## 2024-02-22 VITALS
OXYGEN SATURATION: 96 % | HEART RATE: 81 BPM | WEIGHT: 211 LBS | DIASTOLIC BLOOD PRESSURE: 62 MMHG | SYSTOLIC BLOOD PRESSURE: 120 MMHG | HEIGHT: 73 IN | BODY MASS INDEX: 27.96 KG/M2

## 2024-02-22 DIAGNOSIS — F51.01 PRIMARY INSOMNIA: ICD-10-CM

## 2024-02-22 DIAGNOSIS — M94.0 COSTOCHONDRITIS: ICD-10-CM

## 2024-02-22 DIAGNOSIS — I10 ESSENTIAL HYPERTENSION: ICD-10-CM

## 2024-02-22 DIAGNOSIS — F33.42 RECURRENT MAJOR DEPRESSIVE DISORDER, IN FULL REMISSION: Primary | ICD-10-CM

## 2024-02-22 DIAGNOSIS — E78.5 HYPERLIPIDEMIA, UNSPECIFIED HYPERLIPIDEMIA TYPE: ICD-10-CM

## 2024-02-22 DIAGNOSIS — K21.9 GASTROESOPHAGEAL REFLUX DISEASE, UNSPECIFIED WHETHER ESOPHAGITIS PRESENT: ICD-10-CM

## 2024-02-22 PROCEDURE — 99214 OFFICE O/P EST MOD 30 MIN: CPT | Mod: 25,S$GLB,, | Performed by: NURSE PRACTITIONER

## 2024-02-22 PROCEDURE — 3078F DIAST BP <80 MM HG: CPT | Mod: CPTII,S$GLB,, | Performed by: NURSE PRACTITIONER

## 2024-02-22 PROCEDURE — 3074F SYST BP LT 130 MM HG: CPT | Mod: CPTII,S$GLB,, | Performed by: NURSE PRACTITIONER

## 2024-02-22 PROCEDURE — 1159F MED LIST DOCD IN RCRD: CPT | Mod: CPTII,S$GLB,, | Performed by: NURSE PRACTITIONER

## 2024-02-22 PROCEDURE — 3008F BODY MASS INDEX DOCD: CPT | Mod: CPTII,S$GLB,, | Performed by: NURSE PRACTITIONER

## 2024-02-22 PROCEDURE — 93000 ELECTROCARDIOGRAM COMPLETE: CPT | Mod: S$GLB,,, | Performed by: NURSE PRACTITIONER

## 2024-02-22 PROCEDURE — 1160F RVW MEDS BY RX/DR IN RCRD: CPT | Mod: CPTII,S$GLB,, | Performed by: NURSE PRACTITIONER

## 2024-02-22 RX ORDER — MELOXICAM 15 MG/1
15 TABLET ORAL DAILY
Qty: 30 TABLET | Refills: 0 | Status: SHIPPED | OUTPATIENT
Start: 2024-02-22

## 2024-02-22 RX ORDER — OMEPRAZOLE 20 MG/1
20 CAPSULE, DELAYED RELEASE ORAL 2 TIMES DAILY
Qty: 60 CAPSULE | Refills: 11 | Status: SHIPPED | OUTPATIENT
Start: 2024-02-22 | End: 2025-02-21

## 2024-02-26 LAB
EKG 12-LEAD: NORMAL
PR INTERVAL: NORMAL
PRT AXES: NORMAL
QRS DURATION: NORMAL
QT/QTC: NORMAL
VENTRICULAR RATE: NORMAL

## 2024-02-26 NOTE — PROGRESS NOTES
"  SUBJECTIVE:    Patient ID: Jean Bates is a 63 y.o. male.    Chief Complaint: Chest Pain (No bottles//Pt here for chest pain on left side of chest x 2 weeks. States it feels like "someone punched him." Pain is intermittent. Hurts most when flexing the muscle or bending over. Denies injury to site// declines flu vacc//JL)    63 year old male  presents for urgent visit. He is treated for anxiety and depression ed, insomnia, gerd, htn and hyperlipidemia. Bp in office is well controlled. .followed regularly by dr. Bonner. Recent stress test was normal. Planning to have echo done this month. Started having chest pain x about 2 weeks. No sob. Started spontaneously. Worse with movement. No increase in gerd symptoms. No belching. Last labs 11/23 with dr. bonner    Chest Pain   Pertinent negatives include no abdominal pain, cough, dizziness, fever, headaches, nausea, shortness of breath, vomiting or weakness.       Office Visit on 02/22/2024   Component Date Value Ref Range Status    EKG 12-Lead 02/26/2024 nsr   Final       Past Medical History:   Diagnosis Date    Depression     GERD (gastroesophageal reflux disease)      Social History     Socioeconomic History    Marital status:    Tobacco Use    Smoking status: Never    Smokeless tobacco: Never   Substance and Sexual Activity    Alcohol use: Not Currently     Alcohol/week: 4.0 standard drinks of alcohol     Types: 4 Cans of beer per week     Comment: Drinks 2-4 beers daily    Drug use: Never    Sexual activity: Yes     Partners: Female     Past Surgical History:   Procedure Laterality Date    HERNIA REPAIR       Family History   Problem Relation Age of Onset    Hypertension Mother     Heart disease Mother     Alzheimer's disease Mother     No Known Problems Father     Kidney disease Brother         Renal stones       All of your core healthy metrics are met.      Review of patient's allergies indicates:  No Known Allergies    Current Outpatient Medications:     " "buPROPion (WELLBUTRIN SR) 150 MG TBSR 12 hr tablet, Take 1 tablet (150 mg total) by mouth 2 (two) times daily., Disp: 60 tablet, Rfl: 5    citalopram (CELEXA) 20 MG tablet, Take 1 tablet (20 mg total) by mouth once daily., Disp: 90 tablet, Rfl: 1    rosuvastatin (CRESTOR) 5 MG tablet, Take 1 tablet (5 mg total) by mouth once daily., Disp: 90 tablet, Rfl: 3    sildenafiL (VIAGRA) 50 MG tablet, Take 1 tablet (50 mg total) by mouth daily as needed for Erectile Dysfunction., Disp: 15 tablet, Rfl: 1    valsartan (DIOVAN) 160 MG tablet, Take 1 tablet (160 mg total) by mouth once daily., Disp: 30 tablet, Rfl: 5    zolpidem (AMBIEN) 5 MG Tab, Take 1 tablet (5 mg total) by mouth every evening., Disp: 90 tablet, Rfl: 1    meloxicam (MOBIC) 15 MG tablet, Take 1 tablet (15 mg total) by mouth once daily., Disp: 30 tablet, Rfl: 0    omeprazole (PRILOSEC) 20 MG capsule, Take 1 capsule (20 mg total) by mouth 2 (two) times a day., Disp: 60 capsule, Rfl: 11    Review of Systems   Constitutional:  Negative for fatigue, fever and unexpected weight change.   HENT:  Negative for ear pain, sinus pressure and sore throat.    Respiratory:  Negative for cough and shortness of breath.    Cardiovascular:  Positive for chest pain. Negative for leg swelling.   Gastrointestinal:  Negative for abdominal pain, constipation, nausea and vomiting.   Genitourinary:  Negative for dysuria, frequency and urgency.   Musculoskeletal:  Negative for arthralgias.   Skin:  Negative for rash.   Neurological:  Negative for dizziness, weakness and headaches.   Psychiatric/Behavioral:  Negative for sleep disturbance.           Objective:      Vitals:    02/22/24 1030   BP: 120/62   Pulse: 81   SpO2: 96%   Weight: 95.7 kg (211 lb)   Height: 6' 0.5" (1.842 m)     Physical Exam  Vitals and nursing note reviewed.   Constitutional:       General: He is not in acute distress.     Appearance: Normal appearance. He is well-developed.   HENT:      Right Ear: External ear " normal.      Left Ear: External ear normal.   Eyes:      Pupils: Pupils are equal, round, and reactive to light.   Neck:      Trachea: No tracheal deviation.   Cardiovascular:      Rate and Rhythm: Normal rate and regular rhythm.      Heart sounds: No murmur heard.     No friction rub. No gallop.   Pulmonary:      Breath sounds: Normal breath sounds. No stridor. No wheezing or rales.   Chest:          Comments: Palpation tenderness  Abdominal:      Palpations: Abdomen is soft. There is no mass.      Tenderness: There is no abdominal tenderness.   Musculoskeletal:         General: No tenderness or deformity.      Cervical back: Neck supple.   Lymphadenopathy:      Cervical: No cervical adenopathy.   Skin:     General: Skin is warm and dry.   Neurological:      Mental Status: He is alert and oriented to person, place, and time.      Coordination: Coordination normal.   Psychiatric:         Thought Content: Thought content normal.           Assessment:       1. Recurrent major depressive disorder, in full remission    2. Costochondritis    3. Primary insomnia    4. Gastroesophageal reflux disease, unspecified whether esophagitis present    5. Hyperlipidemia, unspecified hyperlipidemia type    6. Essential hypertension         Plan:       Recurrent major depressive disorder, in full remission  Comments:  celexa. wellbutrin    Costochondritis  Comments:  mobic daily. er or dr. bernard if symptoms continue  Orders:  -     POCT EKG 12-LEAD (Manually Resulted by Ordering Provider)    Primary insomnia  Comments:  ambien    Gastroesophageal reflux disease, unspecified whether esophagitis present  Comments:  increase omeprazole to 40    Hyperlipidemia, unspecified hyperlipidemia type  Comments:  crestor    Essential hypertension  Comments:  bp is well controlled    Other orders  -     omeprazole (PRILOSEC) 20 MG capsule; Take 1 capsule (20 mg total) by mouth 2 (two) times a day.  Dispense: 60 capsule; Refill: 11  -     meloxicam  (MOBIC) 15 MG tablet; Take 1 tablet (15 mg total) by mouth once daily.  Dispense: 30 tablet; Refill: 0      Follow up in about 3 months (around 5/22/2024), or if symptoms worsen or fail to improve, for medication management.        2/26/2024 Diann Arriaga

## 2024-02-27 DIAGNOSIS — F33.42 RECURRENT MAJOR DEPRESSIVE DISORDER, IN FULL REMISSION: ICD-10-CM

## 2024-02-27 RX ORDER — CITALOPRAM 20 MG/1
20 TABLET, FILM COATED ORAL DAILY
Qty: 90 TABLET | Refills: 1 | Status: SHIPPED | OUTPATIENT
Start: 2024-02-27 | End: 2025-02-26

## 2024-03-04 RX ORDER — VALSARTAN 160 MG/1
160 TABLET ORAL DAILY
Qty: 30 TABLET | Refills: 5 | Status: SHIPPED | OUTPATIENT
Start: 2024-03-04 | End: 2024-06-08 | Stop reason: SDUPTHER

## 2024-04-15 DIAGNOSIS — F51.01 PRIMARY INSOMNIA: ICD-10-CM

## 2024-04-15 RX ORDER — ZOLPIDEM TARTRATE 5 MG/1
5 TABLET ORAL NIGHTLY
Qty: 90 TABLET | Refills: 1 | Status: SHIPPED | OUTPATIENT
Start: 2024-04-15

## 2024-06-10 RX ORDER — VALSARTAN 160 MG/1
160 TABLET ORAL DAILY
Qty: 30 TABLET | Refills: 5 | Status: SHIPPED | OUTPATIENT
Start: 2024-06-10

## 2024-07-05 ENCOUNTER — TELEPHONE (OUTPATIENT)
Dept: FAMILY MEDICINE | Facility: CLINIC | Age: 64
End: 2024-07-05
Payer: COMMERCIAL

## 2024-07-05 DIAGNOSIS — Z79.899 ENCOUNTER FOR LONG-TERM (CURRENT) USE OF OTHER MEDICATIONS: ICD-10-CM

## 2024-07-05 DIAGNOSIS — D64.9 ANEMIA, UNSPECIFIED TYPE: ICD-10-CM

## 2024-07-05 DIAGNOSIS — Z00.00 ROUTINE GENERAL MEDICAL EXAMINATION AT A HEALTH CARE FACILITY: ICD-10-CM

## 2024-07-05 DIAGNOSIS — Z79.899 HIGH RISK MEDICATION USE: ICD-10-CM

## 2024-07-05 DIAGNOSIS — I10 ESSENTIAL HYPERTENSION: ICD-10-CM

## 2024-07-05 DIAGNOSIS — E78.5 HYPERLIPIDEMIA, UNSPECIFIED HYPERLIPIDEMIA TYPE: Primary | ICD-10-CM

## 2024-07-17 ENCOUNTER — PATIENT MESSAGE (OUTPATIENT)
Dept: FAMILY MEDICINE | Facility: CLINIC | Age: 64
End: 2024-07-17
Payer: COMMERCIAL

## 2024-07-23 ENCOUNTER — OFFICE VISIT (OUTPATIENT)
Dept: FAMILY MEDICINE | Facility: CLINIC | Age: 64
End: 2024-07-23
Payer: COMMERCIAL

## 2024-07-23 VITALS
HEART RATE: 76 BPM | DIASTOLIC BLOOD PRESSURE: 72 MMHG | BODY MASS INDEX: 26.74 KG/M2 | WEIGHT: 208.38 LBS | SYSTOLIC BLOOD PRESSURE: 116 MMHG | HEIGHT: 74 IN | OXYGEN SATURATION: 95 %

## 2024-07-23 DIAGNOSIS — K21.9 GASTROESOPHAGEAL REFLUX DISEASE, UNSPECIFIED WHETHER ESOPHAGITIS PRESENT: ICD-10-CM

## 2024-07-23 DIAGNOSIS — G47.30 SLEEP APNEA, UNSPECIFIED TYPE: ICD-10-CM

## 2024-07-23 DIAGNOSIS — F51.01 PRIMARY INSOMNIA: Primary | ICD-10-CM

## 2024-07-23 DIAGNOSIS — R07.89 ATYPICAL CHEST PAIN: ICD-10-CM

## 2024-07-23 DIAGNOSIS — F33.42 RECURRENT MAJOR DEPRESSIVE DISORDER, IN FULL REMISSION: ICD-10-CM

## 2024-07-23 DIAGNOSIS — I10 ESSENTIAL HYPERTENSION: ICD-10-CM

## 2024-07-23 DIAGNOSIS — E78.5 HYPERLIPIDEMIA, UNSPECIFIED HYPERLIPIDEMIA TYPE: ICD-10-CM

## 2024-07-23 PROCEDURE — 3008F BODY MASS INDEX DOCD: CPT | Mod: CPTII,S$GLB,, | Performed by: NURSE PRACTITIONER

## 2024-07-23 PROCEDURE — 4010F ACE/ARB THERAPY RXD/TAKEN: CPT | Mod: CPTII,S$GLB,, | Performed by: NURSE PRACTITIONER

## 2024-07-23 PROCEDURE — 1160F RVW MEDS BY RX/DR IN RCRD: CPT | Mod: CPTII,S$GLB,, | Performed by: NURSE PRACTITIONER

## 2024-07-23 PROCEDURE — 3074F SYST BP LT 130 MM HG: CPT | Mod: CPTII,S$GLB,, | Performed by: NURSE PRACTITIONER

## 2024-07-23 PROCEDURE — 3061F NEG MICROALBUMINURIA REV: CPT | Mod: CPTII,S$GLB,, | Performed by: NURSE PRACTITIONER

## 2024-07-23 PROCEDURE — 3066F NEPHROPATHY DOC TX: CPT | Mod: CPTII,S$GLB,, | Performed by: NURSE PRACTITIONER

## 2024-07-23 PROCEDURE — 1159F MED LIST DOCD IN RCRD: CPT | Mod: CPTII,S$GLB,, | Performed by: NURSE PRACTITIONER

## 2024-07-23 PROCEDURE — 3078F DIAST BP <80 MM HG: CPT | Mod: CPTII,S$GLB,, | Performed by: NURSE PRACTITIONER

## 2024-07-23 PROCEDURE — 99214 OFFICE O/P EST MOD 30 MIN: CPT | Mod: S$GLB,,, | Performed by: NURSE PRACTITIONER

## 2024-07-23 NOTE — PROGRESS NOTES
SUBJECTIVE:    Patient ID: Jean Bates is a 64 y.o. male.    Chief Complaint: Follow-up (No Bottles//no refills//reports biopsy on prostate with Dr. Macedo in Jan 2024, records requested//-ERL)    64 year old male  presents for check up He is treated for anxiety and depression ed, insomnia, gerd, htn and hyperlipidemia. Bp in office is well controlled. .followed regularly by dr. Bonner. Recent stress test was normal. Has follow up scheduled next month. Recently diagnosed with sleep apnea. Trying to to adjust to cpap. Moods are ok.     Chest Pain   Pertinent negatives include no abdominal pain, cough, dizziness, fever, headaches, nausea, shortness of breath, vomiting or weakness.       Telephone on 07/05/2024   Component Date Value Ref Range Status    WBC 07/16/2024 6.9  3.8 - 10.8 Thousand/uL Final    RBC 07/16/2024 4.97  4.20 - 5.80 Million/uL Final    Hemoglobin 07/16/2024 15.7  13.2 - 17.1 g/dL Final    Hematocrit 07/16/2024 47.4  38.5 - 50.0 % Final    MCV 07/16/2024 95.4  80.0 - 100.0 fL Final    MCH 07/16/2024 31.6  27.0 - 33.0 pg Final    MCHC 07/16/2024 33.1  32.0 - 36.0 g/dL Final    RDW 07/16/2024 12.7  11.0 - 15.0 % Final    Platelets 07/16/2024 210  140 - 400 Thousand/uL Final    MPV 07/16/2024 10.4  7.5 - 12.5 fL Final    Neutrophils, Abs 07/16/2024 3,905  1,500 - 7,800 cells/uL Final    Lymph # 07/16/2024 2,022  850 - 3,900 cells/uL Final    Mono # 07/16/2024 607  200 - 950 cells/uL Final    Eos # 07/16/2024 297  15 - 500 cells/uL Final    Baso # 07/16/2024 69  0 - 200 cells/uL Final    Neutrophils Relative 07/16/2024 56.6  % Final    Lymph % 07/16/2024 29.3  % Final    Mono % 07/16/2024 8.8  % Final    Eosinophil % 07/16/2024 4.3  % Final    Basophil % 07/16/2024 1.0  % Final    Glucose 07/16/2024 93  65 - 99 mg/dL Final    BUN 07/16/2024 16  7 - 25 mg/dL Final    Creatinine 07/16/2024 0.81  0.70 - 1.35 mg/dL Final    eGFR 07/16/2024 99  > OR = 60 mL/min/1.73m2 Final    BUN/Creatinine Ratio  07/16/2024 SEE NOTE:  6 - 22 (calc) Final    Sodium 07/16/2024 139  135 - 146 mmol/L Final    Potassium 07/16/2024 4.6  3.5 - 5.3 mmol/L Final    Chloride 07/16/2024 104  98 - 110 mmol/L Final    CO2 07/16/2024 27  20 - 32 mmol/L Final    Calcium 07/16/2024 9.3  8.6 - 10.3 mg/dL Final    Total Protein 07/16/2024 6.4  6.1 - 8.1 g/dL Final    Albumin 07/16/2024 4.2  3.6 - 5.1 g/dL Final    Globulin, Total 07/16/2024 2.2  1.9 - 3.7 g/dL (calc) Final    Albumin/Globulin Ratio 07/16/2024 1.9  1.0 - 2.5 (calc) Final    Total Bilirubin 07/16/2024 0.8  0.2 - 1.2 mg/dL Final    Alkaline Phosphatase 07/16/2024 70  35 - 144 U/L Final    AST 07/16/2024 21  10 - 35 U/L Final    ALT 07/16/2024 22  9 - 46 U/L Final    Cholesterol 07/16/2024 170  <200 mg/dL Final    HDL 07/16/2024 52  > OR = 40 mg/dL Final    Triglycerides 07/16/2024 253 (H)  <150 mg/dL Final    LDL Cholesterol 07/16/2024 85  mg/dL (calc) Final    HDL/Cholesterol Ratio 07/16/2024 3.3  <5.0 (calc) Final    Non HDL Chol. (LDL+VLDL) 07/16/2024 118  <130 mg/dL (calc) Final    Creatinine, Urine 07/16/2024 143  20 - 320 mg/dL Final    Microalb, Ur 07/16/2024 0.4  See Note: mg/dL Final    Microalb/Creat Ratio 07/16/2024 3  <30 mg/g creat Final    TSH w/reflex to FT4 07/16/2024 2.01  0.40 - 4.50 mIU/L Final    Color, UA 07/16/2024 YELLOW  YELLOW Final    Appearance, UA 07/16/2024 CLEAR  CLEAR Final    Specific Ira, UA 07/16/2024 1.021  1.001 - 1.035 Final    pH, UA 07/16/2024 5.5  5.0 - 8.0 Final    Glucose, UA 07/16/2024 NEGATIVE  NEGATIVE Final    Bilirubin, UA 07/16/2024 NEGATIVE  NEGATIVE Final    Ketones, UA 07/16/2024 NEGATIVE  NEGATIVE Final    Occult Blood UA 07/16/2024 NEGATIVE  NEGATIVE Final    Protein, UA 07/16/2024 NEGATIVE  NEGATIVE Final    Nitrite, UA 07/16/2024 NEGATIVE  NEGATIVE Final    Leukocytes, UA 07/16/2024 NEGATIVE  NEGATIVE Final    WBC Casts, UA 07/16/2024 NONE SEEN  < OR = 5 /HPF Final    RBC Casts, UA 07/16/2024 NONE SEEN  < OR = 2 /HPF  Final    Squam Epithel, UA 07/16/2024 NONE SEEN  < OR = 5 /HPF Final    Bacteria, UA 07/16/2024 NONE SEEN  NONE SEEN /HPF Final    Hyaline Casts, UA 07/16/2024 NONE SEEN  NONE SEEN /LPF Final    Service Cmt: 07/16/2024 SEE COMMENT   Final    Reflexive Urine Culture 07/16/2024 SEE COMMENT   Final   Office Visit on 02/22/2024   Component Date Value Ref Range Status    EKG 12-Lead 02/26/2024 nsr   Final       Past Medical History:   Diagnosis Date    Depression     GERD (gastroesophageal reflux disease)      Social History     Socioeconomic History    Marital status:    Tobacco Use    Smoking status: Never    Smokeless tobacco: Never   Substance and Sexual Activity    Alcohol use: Not Currently     Alcohol/week: 4.0 standard drinks of alcohol     Types: 4 Cans of beer per week     Comment: Drinks 2-4 beers daily    Drug use: Never    Sexual activity: Yes     Partners: Female     Past Surgical History:   Procedure Laterality Date    HERNIA REPAIR       Family History   Problem Relation Name Age of Onset    Hypertension Mother      Heart disease Mother      Alzheimer's disease Mother      No Known Problems Father      Kidney disease Brother          Renal stones       All of your core healthy metrics are met.      Review of patient's allergies indicates:  No Known Allergies    Current Outpatient Medications:     buPROPion (WELLBUTRIN SR) 150 MG TBSR 12 hr tablet, Take 1 tablet (150 mg total) by mouth 2 (two) times daily., Disp: 60 tablet, Rfl: 5    citalopram (CELEXA) 20 MG tablet, Take 1 tablet (20 mg total) by mouth once daily., Disp: 90 tablet, Rfl: 1    omeprazole (PRILOSEC) 20 MG capsule, Take 1 capsule (20 mg total) by mouth 2 (two) times a day., Disp: 60 capsule, Rfl: 11    rosuvastatin (CRESTOR) 5 MG tablet, Take 1 tablet (5 mg total) by mouth once daily., Disp: 90 tablet, Rfl: 3    valsartan (DIOVAN) 160 MG tablet, Take 1 tablet (160 mg total) by mouth once daily., Disp: 30 tablet, Rfl: 5    zolpidem  "(AMBIEN) 5 MG Tab, Take 1 tablet (5 mg total) by mouth every evening., Disp: 90 tablet, Rfl: 1    sildenafiL (VIAGRA) 50 MG tablet, Take 1 tablet (50 mg total) by mouth daily as needed for Erectile Dysfunction., Disp: 15 tablet, Rfl: 1    Review of Systems   Constitutional:  Negative for fatigue, fever and unexpected weight change.   HENT:  Negative for ear pain, sinus pressure and sore throat.    Eyes:  Negative for pain.   Respiratory:  Negative for cough and shortness of breath.    Cardiovascular:  Negative for chest pain and leg swelling.   Gastrointestinal:  Negative for abdominal pain, constipation, nausea and vomiting.   Genitourinary:  Negative for dysuria, frequency and urgency.   Musculoskeletal:  Negative for arthralgias.   Skin:  Negative for rash.   Neurological:  Negative for dizziness, weakness and headaches.   Psychiatric/Behavioral:  Negative for sleep disturbance.           Objective:      Vitals:    07/23/24 0918   BP: 116/72   Pulse: 76   SpO2: 95%   Weight: 94.5 kg (208 lb 6 oz)   Height: 6' 2" (1.88 m)     Physical Exam  Vitals and nursing note reviewed.   Constitutional:       General: He is not in acute distress.     Appearance: Normal appearance. He is well-developed.   HENT:      Head: Normocephalic and atraumatic.      Right Ear: External ear normal.      Left Ear: External ear normal.   Eyes:      Pupils: Pupils are equal, round, and reactive to light.   Neck:      Trachea: No tracheal deviation.   Cardiovascular:      Rate and Rhythm: Normal rate and regular rhythm.      Heart sounds: No murmur heard.     No friction rub. No gallop.   Pulmonary:      Breath sounds: Normal breath sounds. No stridor. No wheezing or rales.   Abdominal:      Palpations: Abdomen is soft. There is no mass.      Tenderness: There is no abdominal tenderness.   Musculoskeletal:         General: No tenderness or deformity.      Cervical back: Neck supple.   Lymphadenopathy:      Cervical: No cervical adenopathy. "   Skin:     General: Skin is warm and dry.   Neurological:      Mental Status: He is alert and oriented to person, place, and time.      Coordination: Coordination normal.   Psychiatric:         Thought Content: Thought content normal.           Assessment:       1. Primary insomnia    2. Hyperlipidemia, unspecified hyperlipidemia type    3. Essential hypertension    4. Recurrent major depressive disorder, in full remission    5. Gastroesophageal reflux disease, unspecified whether esophagitis present    6. Sleep apnea, unspecified type    7. Atypical chest pain         Plan:       Primary insomnia  Comments:  ambien    Hyperlipidemia, unspecified hyperlipidemia type  Comments:  taking crestor    Essential hypertension  Comments:  bp is well controlled    Recurrent major depressive disorder, in full remission  Comments:  celexa, wellbutrin    Gastroesophageal reflux disease, unspecified whether esophagitis present  Comments:  prilosec    Sleep apnea, unspecified type  Comments:  continue to wear cpap    Atypical chest pain  Comments:  followed by cardio      Follow up in about 6 months (around 1/23/2025), or if symptoms worsen or fail to improve, for medication management.        7/28/2024 Diann Arriaga

## 2024-07-23 NOTE — LETTER
[address] [facility] [address]  [place] [address]  Phone: [phone]   [phone]                        MD Татьяна Boo MD Chequita Williams, MD Matthew Bassett, PA-C Linda Melerine, DARLIN Arriaga, DARLIN Kumari, DARLIN      Date: 07/23/2024        Patient: Jean Bates  YOB: 1960      Please send patient records from procedure in January 2024.         Sincerely,     Ashley Glass LPN

## 2024-07-29 ENCOUNTER — TELEPHONE (OUTPATIENT)
Dept: FAMILY MEDICINE | Facility: CLINIC | Age: 64
End: 2024-07-29
Payer: COMMERCIAL

## 2024-07-29 NOTE — TELEPHONE ENCOUNTER
----- Message from Sherry Mallory sent at 7/29/2024 10:13 AM CDT -----  Ree from 's  office called. She needs the patients last PSA faxed to them. He is there now. Phone  # [phone] FAX #  [phone]. She said they can not get on Jose anymore. GH

## 2024-08-20 DIAGNOSIS — F33.42 RECURRENT MAJOR DEPRESSIVE DISORDER, IN FULL REMISSION: ICD-10-CM

## 2024-08-20 RX ORDER — BUPROPION HYDROCHLORIDE 150 MG/1
150 TABLET, EXTENDED RELEASE ORAL 2 TIMES DAILY
Qty: 60 TABLET | Refills: 5 | Status: SHIPPED | OUTPATIENT
Start: 2024-08-20 | End: 2025-08-20

## 2024-10-10 DIAGNOSIS — F51.01 PRIMARY INSOMNIA: ICD-10-CM

## 2024-10-10 RX ORDER — ZOLPIDEM TARTRATE 5 MG/1
5 TABLET ORAL NIGHTLY
Qty: 90 TABLET | Refills: 0 | Status: SHIPPED | OUTPATIENT
Start: 2024-10-10

## 2024-12-02 DIAGNOSIS — F33.42 RECURRENT MAJOR DEPRESSIVE DISORDER, IN FULL REMISSION: ICD-10-CM

## 2024-12-02 RX ORDER — CITALOPRAM 20 MG/1
20 TABLET, FILM COATED ORAL DAILY
Qty: 90 TABLET | Refills: 1 | Status: SHIPPED | OUTPATIENT
Start: 2024-12-02 | End: 2025-12-02

## 2024-12-23 RX ORDER — OMEPRAZOLE 20 MG/1
20 CAPSULE, DELAYED RELEASE ORAL 2 TIMES DAILY
Qty: 60 CAPSULE | Refills: 11 | Status: SHIPPED | OUTPATIENT
Start: 2024-12-23 | End: 2025-12-23

## 2025-01-06 DIAGNOSIS — F51.01 PRIMARY INSOMNIA: ICD-10-CM

## 2025-01-06 RX ORDER — ZOLPIDEM TARTRATE 5 MG/1
5 TABLET ORAL NIGHTLY
Qty: 90 TABLET | Refills: 0 | Status: SHIPPED | OUTPATIENT
Start: 2025-01-06

## 2025-01-08 ENCOUNTER — PATIENT MESSAGE (OUTPATIENT)
Dept: FAMILY MEDICINE | Facility: CLINIC | Age: 65
End: 2025-01-08
Payer: COMMERCIAL

## 2025-01-20 ENCOUNTER — PATIENT MESSAGE (OUTPATIENT)
Dept: FAMILY MEDICINE | Facility: CLINIC | Age: 65
End: 2025-01-20
Payer: COMMERCIAL

## 2025-02-05 ENCOUNTER — PATIENT MESSAGE (OUTPATIENT)
Dept: FAMILY MEDICINE | Facility: CLINIC | Age: 65
End: 2025-02-05
Payer: COMMERCIAL

## 2025-02-05 DIAGNOSIS — I10 ESSENTIAL HYPERTENSION: ICD-10-CM

## 2025-02-05 DIAGNOSIS — E78.5 HYPERLIPIDEMIA, UNSPECIFIED HYPERLIPIDEMIA TYPE: Primary | ICD-10-CM

## 2025-02-05 DIAGNOSIS — K21.9 GASTROESOPHAGEAL REFLUX DISEASE, UNSPECIFIED WHETHER ESOPHAGITIS PRESENT: ICD-10-CM

## 2025-02-11 LAB
ALBUMIN SERPL-MCNC: 4.2 G/DL (ref 3.6–5.1)
ALBUMIN/GLOB SERPL: 1.8 (CALC) (ref 1–2.5)
ALP SERPL-CCNC: 76 U/L (ref 35–144)
ALT SERPL-CCNC: 25 U/L (ref 9–46)
AST SERPL-CCNC: 24 U/L (ref 10–35)
BILIRUB SERPL-MCNC: 0.8 MG/DL (ref 0.2–1.2)
BUN SERPL-MCNC: 16 MG/DL (ref 7–25)
BUN/CREAT SERPL: NORMAL (CALC) (ref 6–22)
CALCIUM SERPL-MCNC: 9.5 MG/DL (ref 8.6–10.3)
CHLORIDE SERPL-SCNC: 105 MMOL/L (ref 98–110)
CHOLEST SERPL-MCNC: 167 MG/DL
CHOLEST/HDLC SERPL: 2.9 (CALC)
CO2 SERPL-SCNC: 27 MMOL/L (ref 20–32)
CREAT SERPL-MCNC: 0.98 MG/DL (ref 0.7–1.35)
EGFR: 87 ML/MIN/1.73M2
GLOBULIN SER CALC-MCNC: 2.3 G/DL (CALC) (ref 1.9–3.7)
GLUCOSE SERPL-MCNC: 95 MG/DL (ref 65–99)
HDLC SERPL-MCNC: 58 MG/DL
LDLC SERPL CALC-MCNC: 81 MG/DL (CALC)
NONHDLC SERPL-MCNC: 109 MG/DL (CALC)
POTASSIUM SERPL-SCNC: 5.3 MMOL/L (ref 3.5–5.3)
PROT SERPL-MCNC: 6.5 G/DL (ref 6.1–8.1)
SODIUM SERPL-SCNC: 139 MMOL/L (ref 135–146)
TRIGL SERPL-MCNC: 188 MG/DL

## 2025-02-12 ENCOUNTER — OFFICE VISIT (OUTPATIENT)
Dept: FAMILY MEDICINE | Facility: CLINIC | Age: 65
End: 2025-02-12
Payer: COMMERCIAL

## 2025-02-12 VITALS
HEART RATE: 79 BPM | DIASTOLIC BLOOD PRESSURE: 64 MMHG | HEIGHT: 74 IN | SYSTOLIC BLOOD PRESSURE: 128 MMHG | BODY MASS INDEX: 27.29 KG/M2 | OXYGEN SATURATION: 96 % | WEIGHT: 212.63 LBS

## 2025-02-12 DIAGNOSIS — F51.01 PRIMARY INSOMNIA: ICD-10-CM

## 2025-02-12 DIAGNOSIS — G47.30 SLEEP APNEA, UNSPECIFIED TYPE: ICD-10-CM

## 2025-02-12 DIAGNOSIS — E78.5 HYPERLIPIDEMIA, UNSPECIFIED HYPERLIPIDEMIA TYPE: ICD-10-CM

## 2025-02-12 DIAGNOSIS — F33.42 RECURRENT MAJOR DEPRESSIVE DISORDER, IN FULL REMISSION: ICD-10-CM

## 2025-02-12 DIAGNOSIS — I10 ESSENTIAL HYPERTENSION: Primary | ICD-10-CM

## 2025-02-12 DIAGNOSIS — K21.9 GASTROESOPHAGEAL REFLUX DISEASE, UNSPECIFIED WHETHER ESOPHAGITIS PRESENT: ICD-10-CM

## 2025-02-12 DIAGNOSIS — I70.0 ATHEROSCLEROSIS OF AORTA: ICD-10-CM

## 2025-02-12 RX ORDER — ROSUVASTATIN CALCIUM 5 MG/1
5 TABLET, COATED ORAL DAILY
Qty: 90 TABLET | Refills: 3 | Status: SHIPPED | OUTPATIENT
Start: 2025-02-12 | End: 2025-02-17 | Stop reason: SDUPTHER

## 2025-02-12 NOTE — PROGRESS NOTES
SUBJECTIVE:    Patient ID: Jean Bates is a 64 y.o. male.    Chief Complaint: Follow-up (No bottles//Pt is here for a 6 month follow up//KE)    History of Present Illness    64 year old male  presents today for follow-up    CARDIOVASCULAR:  He has diastolic dysfunction. Home blood pressure measurements show diastolic reading of 64 mmHg, which is lower than previous values.    SLEEP APNEA:  He reports difficulty using CPAP and switched to a mouth guard. Initially, the mouth guard was effective in reducing snoring. However, its efficacy has decreased over time due to fit issues, requiring device adjustments. He has not had a follow-up sleep study since the initial one. Currently, he feels the mouth guard is not making a substantial difference in his sleep quality.    MENTAL HEALTH:  His mood is well-controlled on venlafaxine and citalopram.    MEDICATIONS:  Current medications include Ambien, Valsartan 160 mg, Rosuvastatin 5 mg, Omeprazole, Celexa, and Bupropion.    LABS:  Total cholesterol decreased to 167 from previous measurement seven months ago. LDL cholesterol decreased from 85 to 81, and HDL cholesterol improved from 52 to 58. Triglycerides decreased from 253 to 188.    LIFESTYLE:  He exercises more frequently and attends gym several days per week.      ROS:  General: -fever, -chills, -fatigue, -weight gain, -weight loss  Eyes: -vision changes, -redness, -discharge  ENT: -ear pain, -nasal congestion, -sore throat  Cardiovascular: -chest pain, -palpitations, -lower extremity edema  Respiratory: -cough, -shortness of breath  Gastrointestinal: -abdominal pain, -nausea, -vomiting, -diarrhea, -constipation, -blood in stool  Genitourinary: -dysuria, -hematuria, -frequency  Musculoskeletal: -joint pain, -muscle pain  Skin: -rash, -lesion  Neurological: -headache, -dizziness, -numbness, -tingling  Psychiatric: -anxiety, -depression, +sleep difficulty         Patient Message on 02/05/2025   Component Date Value Ref  "Range Status    Glucose 02/10/2025 95  65 - 99 mg/dL Final    BUN 02/10/2025 16  7 - 25 mg/dL Final    Creatinine 02/10/2025 0.98  0.70 - 1.35 mg/dL Final    eGFR 02/10/2025 87  > OR = 60 mL/min/1.73m2 Final    BUN/Creatinine Ratio 02/10/2025 SEE NOTE:  6 - 22 (calc) Final    Sodium 02/10/2025 139  135 - 146 mmol/L Final    Potassium 02/10/2025 5.3  3.5 - 5.3 mmol/L Final    Chloride 02/10/2025 105  98 - 110 mmol/L Final    CO2 02/10/2025 27  20 - 32 mmol/L Final    Calcium 02/10/2025 9.5  8.6 - 10.3 mg/dL Final    Total Protein 02/10/2025 6.5  6.1 - 8.1 g/dL Final    Albumin 02/10/2025 4.2  3.6 - 5.1 g/dL Final    Globulin, Total 02/10/2025 2.3  1.9 - 3.7 g/dL (calc) Final    Albumin/Globulin Ratio 02/10/2025 1.8  1.0 - 2.5 (calc) Final    Total Bilirubin 02/10/2025 0.8  0.2 - 1.2 mg/dL Final    Alkaline Phosphatase 02/10/2025 76  35 - 144 U/L Final    AST 02/10/2025 24  10 - 35 U/L Final    ALT 02/10/2025 25  9 - 46 U/L Final    Cholesterol 02/10/2025 167  <200 mg/dL Final    HDL 02/10/2025 58  > OR = 40 mg/dL Final    Triglycerides 02/10/2025 188 (H)  <150 mg/dL Final    LDL Cholesterol 02/10/2025 81  mg/dL (calc) Final    HDL/Cholesterol Ratio 02/10/2025 2.9  <5.0 (calc) Final    Non HDL Chol. (LDL+VLDL) 02/10/2025 109  <130 mg/dL (calc) Final       Past Medical History:   Diagnosis Date    Depression     GERD (gastroesophageal reflux disease)      Social History[1]  Past Surgical History:   Procedure Laterality Date    HERNIA REPAIR       Family History   Problem Relation Name Age of Onset    Hypertension Mother      Heart disease Mother      Alzheimer's disease Mother      No Known Problems Father      Kidney disease Brother          Renal stones       All of your core healthy metrics are met.      Review of patient's allergies indicates:  No Known Allergies  Current Medications[2]    Objective:      Vitals:    02/12/25 1200   BP: 128/64   Pulse: 79   SpO2: 96%   Weight: 96.4 kg (212 lb 9.6 oz)   Height: 6' 2" " (1.88 m)     Physical Exam    General: No acute distress. Well-developed. Well-nourished.  Eyes: EOMI. Sclerae anicteric.  HENT: Normocephalic. Atraumatic. Nares patent. Moist oral mucosa.  Ears: Bilateral TMs clear. Bilateral EACs clear.  Cardiovascular: Regular rate. Regular rhythm. No murmurs. No rubs.   Respiratory: Normal respiratory effort. Clear to auscultation bilaterally. No rales. No rhonchi. No wheezing.  Abdomen: Soft. Non-tender. Non-distended. Normoactive bowel sounds.  Musculoskeletal: No  obvious deformity.  Extremities: No lower extremity edema.  Neurological: Alert & oriented x3. No slurred speech. Normal gait.  Psychiatric: Normal mood. Normal affect. Good insight. Good judgment.  Skin: Warm. Dry. No rash.       Assessment:       Assessment & Plan    - Reviewed blood pressure trends, noting normal diastolic readings  - Assessed recent cholesterol panel, noting improvements in lipid profile  - Attributed lipid profile improvements to dietary changes and increased exercise  - Considered sleep apnea management options, acknowledging difficulties with CPAP and limited success with oral appliance  - Deferred to ENT specialist for potential surgical interventions or implantable device for sleep apnea    - Explained normal diastolic blood pressure range (60-80).  - Continued Valsartan 160 mg daily for blood pressure management.  - Monitored the patient's blood pressure readings, noting current diastolic pressure at 64, which is lower than usual for the patient but within normal range.  - Evaluated the patient's cholesterol levels, noting improvements in total cholesterol, LDL, HDL, and triglycerides.  - Assessed that the patient's improved cholesterol levels and heart disease risk factors are likely due to diet changes and increased exercise.  - Scheduled a follow-up visit with a cardiologist in a few months, possibly including another echocardiogram.  - Planned a full panel of labs at the next follow-up  visit in 6 months.  - Encouraged continued exercise, noting its benefits for cholesterol levels and heart health.    HYPERLIPIDEMIA:  - Refilled Rosuvastatin 5 mg with 1-year supply for cholesterol management.  - Monitored cholesterol levels: total cholesterol decreased from 7 months ago, LDL decreased by 4 points (85 to 81), HDL increased by 6 points (52 to 58), and triglycerides decreased significantly from 253 to 188.    SLEEP APNEA:  - Monitored the patient's condition through ENT specialist for sleep apnea.  - Jean could not make the CPAP work and tried a mouth guard, which initially seemed effective but then stopped working.  - Assessed the need to explore other treatment options with the ENT specialist, including potential minor surgery or an implantable device.  - Recommend discussion with the ENT specialist who has more experience with these treatments.    MEDICATIONS/SUPPLEMENTS:  - Continued Ambien prescription.    EXERCISE REGIMEN:  - Jean has been following exercise advice from another physician (YADI's office).       Plan:       Essential hypertension  Comments:  bp controlled    Hyperlipidemia, unspecified hyperlipidemia type  Comments:  crestor  Orders:  -     rosuvastatin (CRESTOR) 5 MG tablet; Take 1 tablet (5 mg total) by mouth once daily.  Dispense: 90 tablet; Refill: 3    Gastroesophageal reflux disease, unspecified whether esophagitis present  Comments:  prilosec    Primary insomnia  Comments:  ambien    Recurrent major depressive disorder, in full remission  Comments:  celexa. wellbutrin    Atherosclerosis of aorta  Comments:  followed by dr. bernard    Sleep apnea, unspecified type  Comments:  being followed by ent      Follow up in about 6 months (around 8/12/2025), or if symptoms worsen or fail to improve, for medication management.        This note was generated with the assistance of ambient listening technology. Verbal consent was obtained by the patient and accompanying visitor(s)  for the recording of patient appointment to facilitate this note. I attest to having reviewed and edited the generated note for accuracy, though some syntax or spelling errors may persist. Please contact the author of this note for any clarification.      2/17/2025 Diann Arriaga           [1]   Social History  Socioeconomic History    Marital status:    Tobacco Use    Smoking status: Never    Smokeless tobacco: Never   Substance and Sexual Activity    Alcohol use: Not Currently     Alcohol/week: 4.0 standard drinks of alcohol     Types: 4 Cans of beer per week     Comment: Drinks 2-4 beers daily    Drug use: Never    Sexual activity: Yes     Partners: Female   [2]   Current Outpatient Medications:     buPROPion (WELLBUTRIN SR) 150 MG TBSR 12 hr tablet, Take 1 tablet (150 mg total) by mouth 2 (two) times daily., Disp: 60 tablet, Rfl: 5    citalopram (CELEXA) 20 MG tablet, Take 1 tablet (20 mg total) by mouth once daily., Disp: 90 tablet, Rfl: 1    omeprazole (PRILOSEC) 20 MG capsule, Take 1 capsule (20 mg total) by mouth 2 (two) times a day., Disp: 60 capsule, Rfl: 11    rosuvastatin (CRESTOR) 5 MG tablet, Take 1 tablet (5 mg total) by mouth once daily., Disp: 90 tablet, Rfl: 3    sildenafiL (VIAGRA) 50 MG tablet, Take 1 tablet (50 mg total) by mouth daily as needed for Erectile Dysfunction., Disp: 15 tablet, Rfl: 1    valsartan (DIOVAN) 160 MG tablet, Take 1 tablet (160 mg total) by mouth once daily., Disp: 30 tablet, Rfl: 5    zolpidem (AMBIEN) 5 MG Tab, Take 1 tablet (5 mg total) by mouth every evening., Disp: 90 tablet, Rfl: 0

## 2025-02-17 ENCOUNTER — RESULTS FOLLOW-UP (OUTPATIENT)
Dept: FAMILY MEDICINE | Facility: CLINIC | Age: 65
End: 2025-02-17

## 2025-02-17 DIAGNOSIS — F33.42 RECURRENT MAJOR DEPRESSIVE DISORDER, IN FULL REMISSION: ICD-10-CM

## 2025-02-17 DIAGNOSIS — E78.5 HYPERLIPIDEMIA, UNSPECIFIED HYPERLIPIDEMIA TYPE: ICD-10-CM

## 2025-02-18 RX ORDER — ROSUVASTATIN CALCIUM 5 MG/1
5 TABLET, COATED ORAL DAILY
Qty: 90 TABLET | Refills: 3 | Status: SHIPPED | OUTPATIENT
Start: 2025-02-18 | End: 2026-02-18

## 2025-02-18 RX ORDER — BUPROPION HYDROCHLORIDE 150 MG/1
150 TABLET, EXTENDED RELEASE ORAL 2 TIMES DAILY
Qty: 60 TABLET | Refills: 5 | Status: SHIPPED | OUTPATIENT
Start: 2025-02-18 | End: 2026-02-18

## 2025-02-18 RX ORDER — OMEPRAZOLE 20 MG/1
20 CAPSULE, DELAYED RELEASE ORAL 2 TIMES DAILY
Qty: 60 CAPSULE | Refills: 11 | Status: SHIPPED | OUTPATIENT
Start: 2025-02-18 | End: 2026-02-18

## 2025-03-26 ENCOUNTER — TELEPHONE (OUTPATIENT)
Dept: FAMILY MEDICINE | Facility: CLINIC | Age: 65
End: 2025-03-26
Payer: COMMERCIAL

## 2025-03-26 NOTE — TELEPHONE ENCOUNTER
----- Message from Dara sent at 3/26/2025  9:10 AM CDT -----  Pt went on a trip and his ear is plugged from the plane ride. Would like to be seen [phone]

## 2025-03-26 NOTE — TELEPHONE ENCOUNTER
----- Message from Dara sent at 3/26/2025  2:02 PM CDT -----  - 1:57- pt is calling [employer] [phone]

## 2025-03-26 NOTE — TELEPHONE ENCOUNTER
Spoke with patient, needed to be seen tomorrow afternoon only. Was okay with seeing anyone. Scheduled.

## 2025-03-27 ENCOUNTER — OFFICE VISIT (OUTPATIENT)
Dept: FAMILY MEDICINE | Facility: CLINIC | Age: 65
End: 2025-03-27
Payer: COMMERCIAL

## 2025-03-27 VITALS
HEART RATE: 74 BPM | DIASTOLIC BLOOD PRESSURE: 68 MMHG | SYSTOLIC BLOOD PRESSURE: 112 MMHG | WEIGHT: 212 LBS | OXYGEN SATURATION: 96 % | HEIGHT: 74 IN | BODY MASS INDEX: 27.21 KG/M2

## 2025-03-27 DIAGNOSIS — R09.81 NASAL SINUS CONGESTION: Primary | ICD-10-CM

## 2025-03-27 DIAGNOSIS — H93.8X1 PRESSURE SENSATION IN RIGHT EAR: ICD-10-CM

## 2025-03-27 PROCEDURE — 1159F MED LIST DOCD IN RCRD: CPT | Mod: CPTII,S$GLB,,

## 2025-03-27 PROCEDURE — 3078F DIAST BP <80 MM HG: CPT | Mod: CPTII,S$GLB,,

## 2025-03-27 PROCEDURE — 3074F SYST BP LT 130 MM HG: CPT | Mod: CPTII,S$GLB,,

## 2025-03-27 PROCEDURE — 99213 OFFICE O/P EST LOW 20 MIN: CPT | Mod: S$GLB,,,

## 2025-03-27 PROCEDURE — 3008F BODY MASS INDEX DOCD: CPT | Mod: CPTII,S$GLB,,

## 2025-03-27 PROCEDURE — 4010F ACE/ARB THERAPY RXD/TAKEN: CPT | Mod: CPTII,S$GLB,,

## 2025-03-27 RX ORDER — FLUTICASONE PROPIONATE 50 MCG
1 SPRAY, SUSPENSION (ML) NASAL DAILY
Qty: 16 G | Refills: 0 | Status: SHIPPED | OUTPATIENT
Start: 2025-03-27

## 2025-03-27 RX ORDER — PREDNISONE 20 MG/1
20 TABLET ORAL EVERY MORNING
Qty: 3 TABLET | Refills: 0 | Status: SHIPPED | OUTPATIENT
Start: 2025-03-27

## 2025-03-27 NOTE — PATIENT INSTRUCTIONS
"Get some "real" Sudafed 30mg and try ONE dose and see if it de-congests some of that fluid    M-W-F:Maintenance for ear wax or dry/itchy ear canals: Use mineral oil. Tilt head to the side (tilt head to right to apply in left ear and tilt head to left to apply in right ear) and place 3-4 drops in affected ear canal(s) and let sit for around 30 seconds to 1 minute and then bring head up and dab excess oil as it comes from ear canals with clean kleenex. Do this in the evening. This can help soften cerumen (ear wax) and help with the ear's natural exfoliation process to help clear ear wax. Ear cleaning still may be needed even with maintenance drops to help with cerumen (ear wax). If suffering from excessive ear wax, then over the counter debrox drops may be used and if not effective in getting rid of suspected cerumen (ear wax), then follow up in office for further assessment. This can also help as maintenance for individual that suffer from dry itchy ear canals and can also be done in the evening to help with maintenance of dry itchy ear canals). Please note that this is a handout for individuals that want better control of cerumen (ear wax) and for individuals that suffer from chronic dry itchy ear canals and can be used for individuals who suffer with both.         "

## 2025-03-31 RX ORDER — VALSARTAN 160 MG/1
160 TABLET ORAL DAILY
Qty: 30 TABLET | Refills: 5 | Status: SHIPPED | OUTPATIENT
Start: 2025-03-31

## 2025-04-04 ENCOUNTER — PATIENT MESSAGE (OUTPATIENT)
Dept: FAMILY MEDICINE | Facility: CLINIC | Age: 65
End: 2025-04-04
Payer: COMMERCIAL

## 2025-04-07 DIAGNOSIS — F51.01 PRIMARY INSOMNIA: ICD-10-CM

## 2025-04-07 RX ORDER — ZOLPIDEM TARTRATE 5 MG/1
5 TABLET ORAL NIGHTLY
Qty: 90 TABLET | Refills: 0 | Status: SHIPPED | OUTPATIENT
Start: 2025-04-07

## 2025-04-07 NOTE — PROGRESS NOTES
SUBJECTIVE:    Patient ID: Jean Bates is a 64 y.o. male.    Chief Complaint: Ear Fullness (No bottles//Pt is here for a clogged right ear, had Diann look at it not that long ago, took a trip on an airplane and it has been plugged ever since//Has been using D-Brox with no relief//KE)    HPI  History of Present Illness    CHIEF COMPLAINT:  Jean presents today for ear fullness and decreased hearing.    ENT:  He reports ear fullness in one ear that began 2.5 weeks ago after an airplane flight while having a cold. He describes the sensation as feeling like being underwater with approximately 50% hearing loss. He denies ear pain. He has a history of earwax build-up requiring removal every couple of years and currently uses Debrox for earwax management. He allowed his recent cold to resolve without medications.    MEDICAL HISTORY:  He has well-controlled hypertension on medication.      ROS:  General: -fever, -chills, -fatigue, -weight gain, -weight loss  Eyes: -vision changes, -redness, -discharge  ENT: -ear pain, -nasal congestion, -sore throat, +ear pressure, +hearing loss, +difficulty hearing  Cardiovascular: -chest pain, -palpitations, -lower extremity edema  Respiratory: -cough, -shortness of breath  Gastrointestinal: -abdominal pain, -nausea, -vomiting, -diarrhea, -constipation, -blood in stool  Genitourinary: -dysuria, -hematuria, -frequency  Musculoskeletal: -joint pain, -muscle pain  Skin: -rash, -lesion  Neurological: -headache, -dizziness, -numbness, -tingling  Psychiatric: -anxiety, -depression, -sleep difficulty         Patient Message on 02/05/2025   Component Date Value Ref Range Status    Glucose 02/10/2025 95  65 - 99 mg/dL Final    BUN 02/10/2025 16  7 - 25 mg/dL Final    Creatinine 02/10/2025 0.98  0.70 - 1.35 mg/dL Final    eGFR 02/10/2025 87  > OR = 60 mL/min/1.73m2 Final    BUN/Creatinine Ratio 02/10/2025 SEE NOTE:  6 - 22 (calc) Final    Sodium 02/10/2025 139  135 - 146 mmol/L Final     Potassium 02/10/2025 5.3  3.5 - 5.3 mmol/L Final    Chloride 02/10/2025 105  98 - 110 mmol/L Final    CO2 02/10/2025 27  20 - 32 mmol/L Final    Calcium 02/10/2025 9.5  8.6 - 10.3 mg/dL Final    Total Protein 02/10/2025 6.5  6.1 - 8.1 g/dL Final    Albumin 02/10/2025 4.2  3.6 - 5.1 g/dL Final    Globulin, Total 02/10/2025 2.3  1.9 - 3.7 g/dL (calc) Final    Albumin/Globulin Ratio 02/10/2025 1.8  1.0 - 2.5 (calc) Final    Total Bilirubin 02/10/2025 0.8  0.2 - 1.2 mg/dL Final    Alkaline Phosphatase 02/10/2025 76  35 - 144 U/L Final    AST 02/10/2025 24  10 - 35 U/L Final    ALT 02/10/2025 25  9 - 46 U/L Final    Cholesterol 02/10/2025 167  <200 mg/dL Final    HDL 02/10/2025 58  > OR = 40 mg/dL Final    Triglycerides 02/10/2025 188 (H)  <150 mg/dL Final    LDL Cholesterol 02/10/2025 81  mg/dL (calc) Final    HDL/Cholesterol Ratio 02/10/2025 2.9  <5.0 (calc) Final    Non HDL Chol. (LDL+VLDL) 02/10/2025 109  <130 mg/dL (calc) Final       Past Medical History:   Diagnosis Date    Depression     GERD (gastroesophageal reflux disease)      Social History[1]  Past Surgical History:   Procedure Laterality Date    HERNIA REPAIR       Family History   Problem Relation Name Age of Onset    Hypertension Mother      Heart disease Mother      Alzheimer's disease Mother      No Known Problems Father      Kidney disease Brother          Renal stones       The 10-year CVD risk score (D'Agostino, et al., 2008) is: 12.1%    Values used to calculate the score:      Age: 64 years      Sex: Male      Diabetic: No      Tobacco smoker: No      Systolic Blood Pressure: 112 mmHg      Is BP treated: Yes      HDL Cholesterol: 58 mg/dL      Total Cholesterol: 167 mg/dL    All of your core healthy metrics are met.      Review of patient's allergies indicates:  No Known Allergies  Current Medications[2]    Review of Systems        Objective:      Vitals:    03/27/25 1557   BP: 112/68   Pulse: 74   SpO2: 96%   Weight: 96.2 kg (212 lb)   Height: 6'  "2" (1.88 m)     Physical Exam  Constitutional:       General: He is not in acute distress.     Appearance: Normal appearance. He is not ill-appearing.   HENT:      Right Ear: Tympanic membrane, ear canal and external ear normal.      Left Ear: Tympanic membrane, ear canal and external ear normal.      Nose: Congestion present.      Mouth/Throat:      Pharynx: No posterior oropharyngeal erythema.   Eyes:      General: No scleral icterus.  Cardiovascular:      Heart sounds: Normal heart sounds.   Pulmonary:      Effort: Pulmonary effort is normal.      Breath sounds: Normal breath sounds.   Skin:     General: Skin is warm and dry.   Neurological:      Mental Status: He is alert.       Physical Exam                Assessment:       1. Nasal sinus congestion    2. Pressure sensation in right ear         Plan:       Nasal sinus congestion  -     fluticasone propionate (FLONASE) 50 mcg/actuation nasal spray; 1 spray (50 mcg total) by Each Nostril route once daily.  Dispense: 16 g; Refill: 0  -     predniSONE (DELTASONE) 20 MG tablet; Take 1 tablet (20 mg total) by mouth every morning.  Dispense: 3 tablet; Refill: 0    Pressure sensation in right ear  -     fluticasone propionate (FLONASE) 50 mcg/actuation nasal spray; 1 spray (50 mcg total) by Each Nostril route once daily.  Dispense: 16 g; Refill: 0  -     predniSONE (DELTASONE) 20 MG tablet; Take 1 tablet (20 mg total) by mouth every morning.  Dispense: 3 tablet; Refill: 0      Assessment & Plan        IMPRESSION:  - Assessed ear complaint, noting fluid behind eardrum but no infection.  - Determined wax build-up not causing current symptoms.  - Identified cold and air travel as contributing factors.  - Initiated treatment plan to address fluid buildup and associated hearing loss.    PRESSURE SENSATION RIGHT EAR:  - Explained the connection between ear, nose, and throat pathways to the patient.  - Discussed how Flonase helps reduce mucus production and fluid in the ear " by drying up secretions from the source.  - Prescribed Flonase nasal spray.  - Prescribed prednisone, 3 pills, with instructions to take 1 pill in the morning for 3 days.  - Prescribed Sudafed 30 mg, to be taken as directed.  - Examination revealed fluid behind the eardrum, with a bulging eardrum but no signs of infection.  - Assessed the condition as fluid buildup from a recent cold.  - Noted that the patient reports feeling of fullness in the ear and reduced hearing after a cold and airplane flight.  - Noted that the patient denies current ear pain.  - Prescribed Sudafed 30 mg to be taken as directed, if blood pressure allows.  - Informed patient about potential side effects of prednisone, including flushing of cheeks, increased energy, and increased appetite.    FOLLOW-UP:  - Advised the patient to contact the office if symptoms do not improve after treatment.         Follow up if symptoms worsen or fail to improve.        This note was generated with the assistance of ambient listening technology. Verbal consent was obtained by the patient and accompanying visitor(s) for the recording of patient appointment to facilitate this note. I attest to having reviewed and edited the generated note for accuracy, though some syntax or spelling errors may persist. Please contact the author of this note for any clarification.      4/6/2025 Dara Kumari         [1]   Social History  Socioeconomic History    Marital status:    Tobacco Use    Smoking status: Never    Smokeless tobacco: Never   Substance and Sexual Activity    Alcohol use: Not Currently     Alcohol/week: 4.0 standard drinks of alcohol     Types: 4 Cans of beer per week     Comment: Drinks 2-4 beers daily    Drug use: Never    Sexual activity: Yes     Partners: Female   [2]   Current Outpatient Medications:     buPROPion (WELLBUTRIN SR) 150 MG TBSR 12 hr tablet, Take 1 tablet (150 mg total) by mouth 2 (two) times daily., Disp: 60 tablet, Rfl: 5     citalopram (CELEXA) 20 MG tablet, Take 1 tablet (20 mg total) by mouth once daily., Disp: 90 tablet, Rfl: 1    fluticasone propionate (FLONASE) 50 mcg/actuation nasal spray, 1 spray (50 mcg total) by Each Nostril route once daily., Disp: 16 g, Rfl: 0    omeprazole (PRILOSEC) 20 MG capsule, Take 1 capsule (20 mg total) by mouth 2 (two) times a day., Disp: 60 capsule, Rfl: 11    predniSONE (DELTASONE) 20 MG tablet, Take 1 tablet (20 mg total) by mouth every morning., Disp: 3 tablet, Rfl: 0    rosuvastatin (CRESTOR) 5 MG tablet, Take 1 tablet (5 mg total) by mouth once daily., Disp: 90 tablet, Rfl: 3    sildenafiL (VIAGRA) 50 MG tablet, Take 1 tablet (50 mg total) by mouth daily as needed for Erectile Dysfunction., Disp: 15 tablet, Rfl: 1    valsartan (DIOVAN) 160 MG tablet, Take 1 tablet (160 mg total) by mouth once daily., Disp: 30 tablet, Rfl: 5    zolpidem (AMBIEN) 5 MG Tab, Take 1 tablet (5 mg total) by mouth every evening., Disp: 90 tablet, Rfl: 0

## 2025-04-08 ENCOUNTER — PATIENT MESSAGE (OUTPATIENT)
Dept: FAMILY MEDICINE | Facility: CLINIC | Age: 65
End: 2025-04-08
Payer: COMMERCIAL

## 2025-05-05 ENCOUNTER — HOSPITAL ENCOUNTER (OUTPATIENT)
Facility: HOSPITAL | Age: 65
Discharge: HOME | End: 2025-05-06
Attending: EMERGENCY MEDICINE
Payer: COMMERCIAL

## 2025-05-05 DIAGNOSIS — R11.2 NAUSEA AND VOMITING, UNSPECIFIED VOMITING TYPE: ICD-10-CM

## 2025-05-05 DIAGNOSIS — R10.13 EPIGASTRIC PAIN: Primary | ICD-10-CM

## 2025-05-05 DIAGNOSIS — R07.9 CHEST PAIN: ICD-10-CM

## 2025-05-05 DIAGNOSIS — R19.7 DIARRHEA, UNSPECIFIED TYPE: ICD-10-CM

## 2025-05-05 DIAGNOSIS — R10.13 EPIGASTRIC ABDOMINAL PAIN: ICD-10-CM

## 2025-05-05 DIAGNOSIS — R06.02 SHORTNESS OF BREATH: ICD-10-CM

## 2025-05-05 PROBLEM — G47.33 OSA (OBSTRUCTIVE SLEEP APNEA): Status: ACTIVE | Noted: 2025-05-05

## 2025-05-05 PROBLEM — I10 HYPERTENSION: Status: ACTIVE | Noted: 2025-05-05

## 2025-05-05 PROBLEM — K52.9 GASTROENTERITIS: Status: ACTIVE | Noted: 2025-05-05

## 2025-05-05 LAB
ABSOLUTE EOSINOPHIL (SMH): 0.01 K/UL
ABSOLUTE MONOCYTE (SMH): 0.61 K/UL (ref 0.3–1)
ABSOLUTE NEUTROPHIL COUNT (SMH): 6.9 K/UL (ref 1.8–7.7)
ALBUMIN SERPL-MCNC: 4.1 G/DL (ref 3.5–5.2)
ALP SERPL-CCNC: 54 UNIT/L (ref 55–135)
ALT SERPL-CCNC: 21 UNIT/L (ref 10–44)
ANION GAP (SMH): 9 MMOL/L (ref 8–16)
AST SERPL-CCNC: 20 UNIT/L (ref 10–40)
BASOPHILS # BLD AUTO: 0.02 K/UL
BASOPHILS NFR BLD AUTO: 0.2 %
BILIRUB SERPL-MCNC: 0.9 MG/DL (ref 0.1–1)
BUN SERPL-MCNC: 15 MG/DL (ref 8–23)
CALCIUM SERPL-MCNC: 9.5 MG/DL (ref 8.7–10.5)
CHLORIDE SERPL-SCNC: 100 MMOL/L (ref 95–110)
CO2 SERPL-SCNC: 26 MMOL/L (ref 23–29)
CREAT SERPL-MCNC: 1 MG/DL (ref 0.5–1.4)
ERYTHROCYTE [DISTWIDTH] IN BLOOD BY AUTOMATED COUNT: 12.8 % (ref 11.5–14.5)
GFR SERPLBLD CREATININE-BSD FMLA CKD-EPI: >60 ML/MIN/1.73/M2
GLUCOSE SERPL-MCNC: 131 MG/DL (ref 70–110)
HCT VFR BLD AUTO: 47.9 % (ref 40–54)
HGB BLD-MCNC: 16.4 GM/DL (ref 14–18)
IMM GRANULOCYTES # BLD AUTO: 0.03 K/UL (ref 0–0.04)
IMM GRANULOCYTES NFR BLD AUTO: 0.4 % (ref 0–0.5)
LIPASE SERPL-CCNC: 9 U/L (ref 4–60)
LYMPHOCYTES # BLD AUTO: 0.71 K/UL (ref 1–4.8)
MCH RBC QN AUTO: 31.6 PG (ref 27–31)
MCHC RBC AUTO-ENTMCNC: 34.2 G/DL (ref 32–36)
MCV RBC AUTO: 92 FL (ref 82–98)
NUCLEATED RBC (/100WBC) (SMH): 0 /100 WBC
PLATELET # BLD AUTO: 225 K/UL (ref 150–450)
PMV BLD AUTO: 10 FL (ref 9.2–12.9)
POTASSIUM SERPL-SCNC: 4.2 MMOL/L (ref 3.5–5.1)
PROT SERPL-MCNC: 6.8 GM/DL (ref 6–8.4)
RBC # BLD AUTO: 5.19 M/UL (ref 4.6–6.2)
RELATIVE EOSINOPHIL (SMH): 0.1 % (ref 0–8)
RELATIVE LYMPHOCYTE (SMH): 8.6 % (ref 18–48)
RELATIVE MONOCYTE (SMH): 7.4 % (ref 4–15)
RELATIVE NEUTROPHIL (SMH): 83.3 % (ref 38–73)
SODIUM SERPL-SCNC: 135 MMOL/L (ref 136–145)
TROPONIN HIGH SENSITIVE (SMH): 3.6 PG/ML
WBC # BLD AUTO: 8.28 K/UL (ref 3.9–12.7)

## 2025-05-05 PROCEDURE — 93010 ELECTROCARDIOGRAM REPORT: CPT | Mod: ,,, | Performed by: INTERNAL MEDICINE

## 2025-05-05 PROCEDURE — G0378 HOSPITAL OBSERVATION PER HR: HCPCS

## 2025-05-05 PROCEDURE — 94761 N-INVAS EAR/PLS OXIMETRY MLT: CPT

## 2025-05-05 PROCEDURE — 94799 UNLISTED PULMONARY SVC/PX: CPT

## 2025-05-05 PROCEDURE — 85025 COMPLETE CBC W/AUTO DIFF WBC: CPT | Performed by: NURSE PRACTITIONER

## 2025-05-05 PROCEDURE — 63600175 PHARM REV CODE 636 W HCPCS: Performed by: STUDENT IN AN ORGANIZED HEALTH CARE EDUCATION/TRAINING PROGRAM

## 2025-05-05 PROCEDURE — 63600175 PHARM REV CODE 636 W HCPCS: Performed by: NURSE PRACTITIONER

## 2025-05-05 PROCEDURE — 63600175 PHARM REV CODE 636 W HCPCS

## 2025-05-05 PROCEDURE — 83690 ASSAY OF LIPASE: CPT | Performed by: NURSE PRACTITIONER

## 2025-05-05 PROCEDURE — 25000003 PHARM REV CODE 250: Performed by: STUDENT IN AN ORGANIZED HEALTH CARE EDUCATION/TRAINING PROGRAM

## 2025-05-05 PROCEDURE — 25000003 PHARM REV CODE 250

## 2025-05-05 PROCEDURE — 25500020 PHARM REV CODE 255: Performed by: EMERGENCY MEDICINE

## 2025-05-05 PROCEDURE — 80053 COMPREHEN METABOLIC PANEL: CPT | Performed by: NURSE PRACTITIONER

## 2025-05-05 PROCEDURE — 93005 ELECTROCARDIOGRAM TRACING: CPT | Performed by: INTERNAL MEDICINE

## 2025-05-05 PROCEDURE — 99900035 HC TECH TIME PER 15 MIN (STAT)

## 2025-05-05 PROCEDURE — 84484 ASSAY OF TROPONIN QUANT: CPT | Performed by: STUDENT IN AN ORGANIZED HEALTH CARE EDUCATION/TRAINING PROGRAM

## 2025-05-05 PROCEDURE — 99900031 HC PATIENT EDUCATION (STAT)

## 2025-05-05 RX ORDER — SODIUM,POTASSIUM PHOSPHATES 280-250MG
2 POWDER IN PACKET (EA) ORAL
Status: DISCONTINUED | OUTPATIENT
Start: 2025-05-05 | End: 2025-05-06 | Stop reason: HOSPADM

## 2025-05-05 RX ORDER — MORPHINE SULFATE 4 MG/ML
4 INJECTION, SOLUTION INTRAMUSCULAR; INTRAVENOUS EVERY 4 HOURS PRN
Refills: 0 | Status: DISCONTINUED | OUTPATIENT
Start: 2025-05-05 | End: 2025-05-06 | Stop reason: HOSPADM

## 2025-05-05 RX ORDER — ZOLPIDEM TARTRATE 5 MG/1
5 TABLET ORAL NIGHTLY
Status: DISCONTINUED | OUTPATIENT
Start: 2025-05-05 | End: 2025-05-06 | Stop reason: HOSPADM

## 2025-05-05 RX ORDER — LANOLIN ALCOHOL/MO/W.PET/CERES
800 CREAM (GRAM) TOPICAL
Status: DISCONTINUED | OUTPATIENT
Start: 2025-05-05 | End: 2025-05-06 | Stop reason: HOSPADM

## 2025-05-05 RX ORDER — NALOXONE HCL 0.4 MG/ML
0.02 VIAL (ML) INJECTION
Status: DISCONTINUED | OUTPATIENT
Start: 2025-05-05 | End: 2025-05-06 | Stop reason: HOSPADM

## 2025-05-05 RX ORDER — ONDANSETRON HYDROCHLORIDE 2 MG/ML
4 INJECTION, SOLUTION INTRAVENOUS
Status: COMPLETED | OUTPATIENT
Start: 2025-05-05 | End: 2025-05-05

## 2025-05-05 RX ORDER — BUPROPION HYDROCHLORIDE 150 MG/1
150 TABLET, EXTENDED RELEASE ORAL 2 TIMES DAILY
Status: DISCONTINUED | OUTPATIENT
Start: 2025-05-05 | End: 2025-05-06 | Stop reason: HOSPADM

## 2025-05-05 RX ORDER — FAMOTIDINE 10 MG/ML
20 INJECTION, SOLUTION INTRAVENOUS
Status: COMPLETED | OUTPATIENT
Start: 2025-05-05 | End: 2025-05-05

## 2025-05-05 RX ORDER — IBUPROFEN 200 MG
16 TABLET ORAL
Status: DISCONTINUED | OUTPATIENT
Start: 2025-05-05 | End: 2025-05-06 | Stop reason: HOSPADM

## 2025-05-05 RX ORDER — GLUCAGON 1 MG
1 KIT INJECTION
Status: DISCONTINUED | OUTPATIENT
Start: 2025-05-05 | End: 2025-05-06 | Stop reason: HOSPADM

## 2025-05-05 RX ORDER — VALSARTAN 80 MG/1
160 TABLET ORAL DAILY
Status: DISCONTINUED | OUTPATIENT
Start: 2025-05-05 | End: 2025-05-06 | Stop reason: HOSPADM

## 2025-05-05 RX ORDER — TALC
6 POWDER (GRAM) TOPICAL NIGHTLY PRN
Status: DISCONTINUED | OUTPATIENT
Start: 2025-05-05 | End: 2025-05-06 | Stop reason: HOSPADM

## 2025-05-05 RX ORDER — MORPHINE SULFATE 4 MG/ML
4 INJECTION, SOLUTION INTRAMUSCULAR; INTRAVENOUS
Refills: 0 | Status: COMPLETED | OUTPATIENT
Start: 2025-05-05 | End: 2025-05-05

## 2025-05-05 RX ORDER — ACETAMINOPHEN 325 MG/1
650 TABLET ORAL EVERY 4 HOURS PRN
Status: DISCONTINUED | OUTPATIENT
Start: 2025-05-05 | End: 2025-05-06 | Stop reason: HOSPADM

## 2025-05-05 RX ORDER — ONDANSETRON HYDROCHLORIDE 2 MG/ML
4 INJECTION, SOLUTION INTRAVENOUS EVERY 6 HOURS PRN
Status: DISCONTINUED | OUTPATIENT
Start: 2025-05-05 | End: 2025-05-06 | Stop reason: HOSPADM

## 2025-05-05 RX ORDER — ACETAMINOPHEN 325 MG/1
650 TABLET ORAL EVERY 8 HOURS PRN
Status: DISCONTINUED | OUTPATIENT
Start: 2025-05-05 | End: 2025-05-06 | Stop reason: HOSPADM

## 2025-05-05 RX ORDER — ALUMINUM HYDROXIDE, MAGNESIUM HYDROXIDE, AND SIMETHICONE 1200; 120; 1200 MG/30ML; MG/30ML; MG/30ML
30 SUSPENSION ORAL 4 TIMES DAILY PRN
Status: DISCONTINUED | OUTPATIENT
Start: 2025-05-05 | End: 2025-05-06 | Stop reason: HOSPADM

## 2025-05-05 RX ORDER — ONDANSETRON HYDROCHLORIDE 2 MG/ML
8 INJECTION, SOLUTION INTRAVENOUS ONCE
Status: DISCONTINUED | OUTPATIENT
Start: 2025-05-05 | End: 2025-05-06 | Stop reason: HOSPADM

## 2025-05-05 RX ORDER — PROCHLORPERAZINE EDISYLATE 5 MG/ML
5 INJECTION INTRAMUSCULAR; INTRAVENOUS EVERY 6 HOURS PRN
Status: DISCONTINUED | OUTPATIENT
Start: 2025-05-05 | End: 2025-05-06 | Stop reason: HOSPADM

## 2025-05-05 RX ORDER — SODIUM CHLORIDE, SODIUM LACTATE, POTASSIUM CHLORIDE, CALCIUM CHLORIDE 600; 310; 30; 20 MG/100ML; MG/100ML; MG/100ML; MG/100ML
INJECTION, SOLUTION INTRAVENOUS CONTINUOUS
Status: ACTIVE | OUTPATIENT
Start: 2025-05-05 | End: 2025-05-06

## 2025-05-05 RX ORDER — IBUPROFEN 200 MG
24 TABLET ORAL
Status: DISCONTINUED | OUTPATIENT
Start: 2025-05-05 | End: 2025-05-06 | Stop reason: HOSPADM

## 2025-05-05 RX ORDER — CITALOPRAM 20 MG/1
20 TABLET, FILM COATED ORAL DAILY
Status: DISCONTINUED | OUTPATIENT
Start: 2025-05-06 | End: 2025-05-06 | Stop reason: HOSPADM

## 2025-05-05 RX ORDER — VALSARTAN 80 MG/1
160 TABLET ORAL DAILY
Status: DISCONTINUED | OUTPATIENT
Start: 2025-05-06 | End: 2025-05-05

## 2025-05-05 RX ORDER — PANTOPRAZOLE SODIUM 40 MG/1
40 TABLET, DELAYED RELEASE ORAL DAILY
Status: DISCONTINUED | OUTPATIENT
Start: 2025-05-06 | End: 2025-05-06 | Stop reason: HOSPADM

## 2025-05-05 RX ADMIN — ONDANSETRON 4 MG: 2 INJECTION INTRAMUSCULAR; INTRAVENOUS at 08:05

## 2025-05-05 RX ADMIN — ZOLPIDEM TARTRATE 5 MG: 5 TABLET, FILM COATED ORAL at 09:05

## 2025-05-05 RX ADMIN — VALSARTAN 160 MG: 80 TABLET, FILM COATED ORAL at 08:05

## 2025-05-05 RX ADMIN — BUPROPION HYDROCHLORIDE 150 MG: 150 TABLET, FILM COATED, EXTENDED RELEASE ORAL at 09:05

## 2025-05-05 RX ADMIN — SODIUM CHLORIDE 1000 ML: 9 INJECTION, SOLUTION INTRAVENOUS at 03:05

## 2025-05-05 RX ADMIN — ONDANSETRON 4 MG: 2 INJECTION INTRAMUSCULAR; INTRAVENOUS at 03:05

## 2025-05-05 RX ADMIN — FAMOTIDINE 20 MG: 10 INJECTION, SOLUTION INTRAVENOUS at 03:05

## 2025-05-05 RX ADMIN — IOHEXOL 100 ML: 350 INJECTION, SOLUTION INTRAVENOUS at 04:05

## 2025-05-05 RX ADMIN — SODIUM CHLORIDE, POTASSIUM CHLORIDE, SODIUM LACTATE AND CALCIUM CHLORIDE: 600; 310; 30; 20 INJECTION, SOLUTION INTRAVENOUS at 09:05

## 2025-05-05 RX ADMIN — MORPHINE SULFATE 4 MG: 4 INJECTION INTRAVENOUS at 07:05

## 2025-05-05 NOTE — FIRST PROVIDER EVALUATION
Emergency Department TeleTriage Encounter Note      CHIEF COMPLAINT    Chief Complaint   Patient presents with    Abdominal Pain     Generalized abdominal pain, vomiting, and diarrhea x 2 days       VITAL SIGNS   Initial Vitals [05/05/25 1426]   BP Pulse Resp Temp SpO2   110/79 94 16 98.9 °F (37.2 °C) 97 %      MAP       --            ALLERGIES    Review of patient's allergies indicates:  No Known Allergies    PROVIDER TRIAGE NOTE  Abdominal pain, nausea, vomiting, and diarrhea for the past two days. Possible fever. Abdominal pain is generalized and he has been able to tolerate fluids but not solids.     Limited physical exam via telehealth: The patient is awake, alert, answering questions appropriately and is not in respiratory distress.  As the Teletriage provider, I performed an initial assessment and ordered appropriate labs and imaging studies, if any, to facilitate the patient's care once placed in the ED. Once a room is available, care and a full evaluation will be completed by an alternate ED provider.  Any additional orders and the final disposition will be determined by that provider.  All imaging and labs will not be followed-up by the Teletriage Team, including myself.          ORDERS  Labs Reviewed - No data to display    ED Orders (720h ago, onward)      Start Ordered     Status Ordering Provider    05/05/25 1500 05/05/25 1453  ondansetron injection 4 mg  ED 1 Time         Ordered LILLIANDAEMMA GUERRERO.    05/05/25 1453 05/05/25 1453  Saline lock IV  Once         Ordered LILLIANDARANAYELIA A.    05/05/25 1453 05/05/25 1453  CBC auto differential  STAT         Ordered EMMA MONTES A.    05/05/25 1453 05/05/25 1453  Comprehensive Metabolic Panel  STAT         Ordered LILLIANDARANAYELIA A.    05/05/25 1453 05/05/25 1453  Lipase  STAT         Ordered LILLIANDARANAYELIA A.    05/05/25 1453 05/05/25 1453  CBC with Differential  PROCEDURE ONCE         Ordered EMMA MONTES              Virtual Visit Note: The provider  triage portion of this emergency department evaluation and documentation was performed via [employer], a HIPAA-compliant telemedicine application, in concert with a tele-presenter in the room. A face to face patient evaluation with one of my colleagues will occur once the patient is placed in an emergency department room.      DISCLAIMER: This note was prepared with [employer] voice recognition transcription software. Garbled syntax, mangled pronouns, and other bizarre constructions may be attributed to that software system.

## 2025-05-05 NOTE — ED PROVIDER NOTES
Encounter Date: 5/5/2025       History     Chief Complaint   Patient presents with    Abdominal Pain     Generalized abdominal pain, vomiting, and diarrhea x 2 days     HPI  Jean Bates is a 65 y.o. M w/ PMHx HTN, HLD presenting for abdominal pain, nausea, vomiting, diarrhea since yesterday morning. Generalized abdominal pain increased in epigastric and bilateral upper quadrants. Nausea with approx 3 episodes nonbloody emesis yesterday. Tolerating liquids but unable to tolerate solids. Multiple episodes non bloody diarrhea since yesterday. Denies lightheadedness, syncope. Denies change in urination. Denies chest pain, SOB, palpitations. Does report he was at a wedding the night prior to symptom onset and drank alcohol. Denies known sick contacts. Hx splenic infarct with similar abdominal pain but without the other associated symptoms.     Review of patient's allergies indicates:  No Known Allergies  Past Medical History:   Diagnosis Date    Depression     GERD (gastroesophageal reflux disease)      Past Surgical History:   Procedure Laterality Date    HERNIA REPAIR       Family History   Problem Relation Name Age of Onset    Hypertension Mother      Heart disease Mother      Alzheimer's disease Mother      No Known Problems Father      Kidney disease Brother          Renal stones     Social History[1]  Review of Systems   Constitutional:  Negative for chills and diaphoresis.   HENT:  Negative for congestion and rhinorrhea.    Respiratory:  Negative for cough and shortness of breath.    Cardiovascular:  Negative for chest pain and palpitations.   Gastrointestinal:  Positive for abdominal pain, diarrhea, nausea and vomiting.   Genitourinary:  Negative for decreased urine volume, dysuria and urgency.   Skin:  Negative for pallor and rash.   Neurological:  Negative for syncope and weakness.   Psychiatric/Behavioral:  Negative for agitation and confusion.        Physical Exam     Initial Vitals [05/05/25 1426]   BP  Pulse Resp Temp SpO2   110/79 94 16 98.9 °F (37.2 °C) 97 %      MAP       --         Physical Exam    Nursing note and vitals reviewed.  Constitutional: He appears well-developed.   HENT:   Head: Normocephalic and atraumatic.   Right Ear: External ear normal.   Left Ear: External ear normal.   Dry mucus membranes   Eyes: Conjunctivae are normal. Right eye exhibits no discharge. Left eye exhibits no discharge.   Neck: Neck supple.   Normal range of motion.  Cardiovascular:  Normal rate, regular rhythm, normal heart sounds and intact distal pulses.           Pulmonary/Chest: Breath sounds normal. No respiratory distress. He has no wheezes.   Abdominal: Abdomen is soft. There is abdominal tenderness.   Bilateral upper quadrant and epigastric tenderness to palpation  No rebound or guarding There is no rebound and no guarding.   Musculoskeletal:         General: Normal range of motion.      Cervical back: Normal range of motion and neck supple.     Neurological: He is alert and oriented to person, place, and time.   Skin: Skin is warm and dry.         ED Course   Procedures  Labs Reviewed   COMPREHENSIVE METABOLIC PANEL - Abnormal       Result Value    Sodium 135 (*)     Potassium 4.2      Chloride 100      CO2 26      Glucose 131 (*)     BUN 15      Creatinine 1.0      Calcium 9.5      Protein Total 6.8      Albumin 4.1      Bilirubin Total 0.9      ALP 54 (*)     AST 20      ALT 21      Anion Gap 9      eGFR >60     CBC WITH DIFFERENTIAL - Abnormal    WBC 8.28      RBC 5.19      Hgb 16.4      Hct 47.9      MCV 92      MCH 31.6 (*)     MCHC 34.2      RDW 12.8      Platelet Count 225      MPV 10.0      Nucleated RBC 0      Neut % 83.3 (*)     Lymph % 8.6 (*)     Mono % 7.4      Eos % 0.1      Basophil % 0.2      Imm Grans % 0.4      Neut # 6.9      Lymph # 0.71 (*)     Mono # 0.61      Eos # 0.01      Baso # 0.02      Imm Grans # 0.03     LIPASE - Normal    Lipase Level 9     TROPONIN I HIGH SENSITIVITY - Normal     Troponin High Sensitive 3.6     CBC W/ AUTO DIFFERENTIAL    Narrative:     The following orders were created for panel order CBC auto differential.  Procedure                               Abnormality         Status                     ---------                               -----------         ------                     CBC with Differential[[id number]]       Abnormal            Final result                 Please view results for these tests on the individual orders.        ECG Results              EKG 12-lead (In process)        Collection Time Result Time QRS Duration OHS QTC Calculation    05/05/25 15:52:26 05/05/25 16:11:21 78 420                     In process by Interface, Lab In [facility] (05/05/25 16:11:29)                   Narrative:    Test Reason : R10.13,    Vent. Rate :  76 BPM     Atrial Rate :  76 BPM     P-R Int : 146 ms          QRS Dur :  78 ms      QT Int : 374 ms       P-R-T Axes :  57  65 160 degrees    QTcB Int : 420 ms    Normal sinus rhythm  Nonspecific ST and T wave abnormality  Abnormal ECG  When compared with ECG of 26-Jun-2022 17:12,  Criteria for Septal infarct are no longer Present  Nonspecific T wave abnormality now evident in Inferior leads  Nonspecific T wave abnormality now evident in Lateral leads    Referred By: AAAREFERRAL SELF           Confirmed By:                                   Imaging Results              X-Ray Chest PA And Lateral (Final result)  Result time 05/05/25 19:26:23      Final result by Rishi Gallagher MD (05/05/25 19:26:23)                   Impression:      No acute intrathoracic process.      Electronically signed by: Rishi Gallagher MD  Date:    05/05/2025  Time:    19:26               Narrative:    EXAMINATION:  XR CHEST PA AND LATERAL    CLINICAL HISTORY:  Shortness of breath    TECHNIQUE:  PA and lateral views of the chest were performed.    COMPARISON:  06/26/2022.    FINDINGS:  The trachea is unremarkable.  There are calcifications of the aortic knob.  The  cardiomediastinal silhouette is within normal limits.  There are no pleural effusions.  There is no evidence of a pneumothorax.  There is no evidence of pneumomediastinum.  No airspace opacity is present.    There is a large hiatal hernia.  There is no evidence of free air beneath the hemidiaphragms.  There are degenerative changes in the osseous structures.                                        CT Abdomen Pelvis With IV Contrast NO Oral Contrast (Final result)  Result time 05/05/25 17:48:32      Final result by Drew Castro MD (05/05/25 17:48:32)                   Impression:      1. Mild diffuse dilation of the small bowel with air-fluid levels.  The bowel diameter tapers distally in the ileum to normal caliber with no focal obstruction is detected.  Enteritis or ileus are considerations.  Partial small bowel obstruction is less likely.  Follow-up is recommended.  2. Large hiatal hernia.  3. Degenerative changes of the lumbar spine primarily at L5-S1.  See above comments.  4. This report was flagged in Epic as abnormal.      Electronically signed by: Drew Castro  Date:    05/05/2025  Time:    17:48               Narrative:    EXAMINATION:  CT ABDOMEN PELVIS WITH IV CONTRAST    CLINICAL HISTORY:  Abdominal pain, acute, nonlocalized;2 days of bilateral upper quadrant abdominal pain w/ nausea, vomiting, diarrhea. Hx splenic infarction 2022;    TECHNIQUE:  Low dose axial images, sagittal and coronal reformations were obtained from the lung bases to the pubic symphysis following the IV administration of 100 mL of Omnipaque 350 .  Oral contrast was not administered.    COMPARISON:  08/12/2022    FINDINGS:  Abdomen:    - Lower thorax:Large hiatal hernia.    - Lung bases: No infiltrates and no nodules.    - Liver: No focal mass.    - Gallbladder: No calcified gallstones.    - Bile Ducts: No evidence of intra or extra hepatic biliary ductal dilation.    - Spleen: Negative.  No evidence of infarction or mass.    -  Kidneys: No mass or hydronephrosis.    - Adrenals: Unremarkable.    - Pancreas: No mass or peripancreatic fat stranding.    - Retroperitoneum:  No significant adenopathy.    - Vascular: No abdominal aortic aneurysm.    - Abdominal wall:  Unremarkable.    The appendix is not definitively identified.  No evidence of acute appendicitis.    Pelvis:    Urinary bladder is nondistended.    Bowel/Mesentery:    There is mild diffuse dilation of the small bowel with air-fluid levels present.  The bowel diameter tapers distally to normal caliber with no focal obstruction detected.  Enteritis or ileus are considerations.  Partial small bowel obstruction is less likely.  Follow-up is recommended.    Bones:  No acute osseous abnormality and no suspicious lytic or blastic lesion.    Severe disc space narrowing and vacuum disc phenomena at L5-S1.    Mild diffuse posterior disc osteophyte complex.  Severe right foraminal narrowing and moderate left foraminal narrowing.                                       Medications   lactated ringers infusion ( Intravenous New Bag 5/6/25 0604)   ondansetron injection 4 mg (4 mg Intravenous Given 5/5/25 1527)   famotidine (PF) injection 20 mg (20 mg Intravenous Given 5/5/25 1544)   sodium chloride 0.9% bolus 1,000 mL 1,000 mL (0 mLs Intravenous Stopped 5/5/25 1818)   iohexoL (OMNIPAQUE 350) injection 100 mL (100 mLs Intravenous Given 5/5/25 1650)   morphine injection 4 mg (4 mg Intravenous Given 5/5/25 1907)   ondansetron injection 4 mg (4 mg Intravenous Given 5/5/25 2019)     Medical Decision Making  Problems Addressed:  Diarrhea, unspecified type: acute illness or injury  Epigastric pain: acute illness or injury that poses a threat to life or bodily functions  Nausea and vomiting, unspecified vomiting type: acute illness or injury    Amount and/or Complexity of Data Reviewed  Labs: ordered.  Radiology: ordered and independent interpretation performed.  ECG/medicine tests: ordered and independent  interpretation performed.    Risk  Prescription drug management.  Parenteral controlled substances.  Decision regarding hospitalization.      Jean Bates is a 65 y.o. male presenting for abdominal pain, nausea, vomiting, diarrhea since yesterday morning. Describes 8/10 primarily epigastric abdominal pain. Vitals stable on arrival . Exam notable for diffuse abdominal tenderness increased in bl upper quadrants and epigastric region. No rebound or guarding.    EKG (as interpreted by me):  NSR at 76 bpm, normal axis, intervals WNL    Imaging:  CXR (as interpreted by me)- large hiatal hernia. No opacity concerning for pneumonia. No effusion.   CTAP - mild diffuse dilation of small bowel w/ air fluid levels. No focal obstruction detected. Enteritis or ileus are considerations. Partial small bowel obstruction less likely. Follow up recommended.     Labs:  No leukocytosis or anemia  No acute electrolyte abnormalities  Troponin WNL  Lipase WNL    ED Management:  During ED course, desaturated to 90% while sleeping. Pt attributes to hx ETELVINA. Denies respiratory symptoms at this time. CXR reassuring as above. Briefly placed on 2L NC but saturation improved while awake.     Denies any improvement in symptoms after zofran, pepcid. Required morphine for pain control.  Given pain severity and CT findings, patient admitted for pain control and observation for possible early developing obstruction.     Genaro Fleming MD  Emergency Medicine PGY-4                Attending Attestation:   Physician Attestation Statement for Resident:  As the supervising MD   Physician Attestation Statement: I have personally seen and examined this patient.   I agree with the above history.  -:   As the supervising MD I agree with the above PE.     As the supervising MD I agree with the above treatment, course, plan, and disposition.    I have reviewed and agree with the residents interpretation of the following: lab data, x-rays, CT scans and EKG.                                         Clinical Impression:  Final diagnoses:  [R10.13] Epigastric pain (Primary)  [R11.2] Nausea and vomiting, unspecified vomiting type  [R19.7] Diarrhea, unspecified type          ED Disposition Condition    Observation                   Genaro Fleming MD  Resident  05/05/25 1920         [1]   Social History  Tobacco Use    Smoking status: Never    Smokeless tobacco: Never   Substance Use Topics    Alcohol use: Not Currently     Alcohol/week: 4.0 standard drinks of alcohol     Types: 4 Cans of beer per week     Comment: Drinks 2-4 beers daily    Drug use: Never        Janet Jaeger MD  05/06/25 1724

## 2025-05-06 VITALS
HEART RATE: 86 BPM | HEIGHT: 74 IN | WEIGHT: 210 LBS | DIASTOLIC BLOOD PRESSURE: 75 MMHG | RESPIRATION RATE: 17 BRPM | OXYGEN SATURATION: 97 % | BODY MASS INDEX: 26.95 KG/M2 | SYSTOLIC BLOOD PRESSURE: 138 MMHG | TEMPERATURE: 99 F

## 2025-05-06 DIAGNOSIS — K52.9 GASTROENTERITIS: Primary | ICD-10-CM

## 2025-05-06 LAB
ANION GAP (SMH): 7 MMOL/L (ref 8–16)
BUN SERPL-MCNC: 16 MG/DL (ref 8–23)
CALCIUM SERPL-MCNC: 9 MG/DL (ref 8.7–10.5)
CHLORIDE SERPL-SCNC: 104 MMOL/L (ref 95–110)
CO2 SERPL-SCNC: 26 MMOL/L (ref 23–29)
CREAT SERPL-MCNC: 0.9 MG/DL (ref 0.5–1.4)
GFR SERPLBLD CREATININE-BSD FMLA CKD-EPI: >60 ML/MIN/1.73/M2
GLUCOSE SERPL-MCNC: 118 MG/DL (ref 70–110)
OHS QRS DURATION: 78 MS
OHS QTC CALCULATION: 420 MS
POTASSIUM SERPL-SCNC: 4.6 MMOL/L (ref 3.5–5.1)
SODIUM SERPL-SCNC: 137 MMOL/L (ref 136–145)

## 2025-05-06 PROCEDURE — 36415 COLL VENOUS BLD VENIPUNCTURE: CPT

## 2025-05-06 PROCEDURE — G0378 HOSPITAL OBSERVATION PER HR: HCPCS

## 2025-05-06 PROCEDURE — 63600175 PHARM REV CODE 636 W HCPCS

## 2025-05-06 PROCEDURE — 80048 BASIC METABOLIC PNL TOTAL CA: CPT

## 2025-05-06 PROCEDURE — 25000003 PHARM REV CODE 250

## 2025-05-06 RX ADMIN — VALSARTAN 160 MG: 80 TABLET, FILM COATED ORAL at 08:05

## 2025-05-06 RX ADMIN — BUPROPION HYDROCHLORIDE 150 MG: 150 TABLET, FILM COATED, EXTENDED RELEASE ORAL at 08:05

## 2025-05-06 RX ADMIN — CITALOPRAM HYDROBROMIDE 20 MG: 20 TABLET ORAL at 08:05

## 2025-05-06 RX ADMIN — PANTOPRAZOLE SODIUM 40 MG: 40 TABLET, DELAYED RELEASE ORAL at 06:05

## 2025-05-06 RX ADMIN — SODIUM CHLORIDE, POTASSIUM CHLORIDE, SODIUM LACTATE AND CALCIUM CHLORIDE: 600; 310; 30; 20 INJECTION, SOLUTION INTRAVENOUS at 06:05

## 2025-05-06 NOTE — HOSPITAL COURSE
Patient monitored closely during observation stay. He received IV fluids, pain control, and analgesics. He is feeling better this am and is tolerating diet.  Denies N/V/D.   He is medically stable for Dc.

## 2025-05-06 NOTE — ASSESSMENT & PLAN NOTE
P/w n/v and diarrhea after attending wedding Saturday  Bowel sounds present, benign abdomen  Supportive measures with antiemetics, analgesics and IVF  Admit to obs, anticipate patient may be able to be Dcd in AM

## 2025-05-06 NOTE — HPI
65-year-old gentleman with comorbid conditions of ETELVINA uses mouth guard, hypertension, depression, hyperlipidemia presents to the emergency department with complaints of nausea, vomiting, abdominal pain and diarrhea of 1 day duration.  Patient states that he was heavily drinking night before last at a wedding and subsequently felt poorly yesterday with the onset of GI symptoms.  Patient is hemodynamically stable, without hypotension or tachycardia.  He does appear dry.  Due to the significant epigastric pain that patient was complaining of CT imaging was ordered.  Some dilated bowel loops are present, no definitive evidence of small-bowel obstruction but findings consistent with enteritis.  Plan to admit to observation with supportive measures.  Patient afebrile and nontoxic appearing.

## 2025-05-06 NOTE — DISCHARGE SUMMARY
[facility]  Discharge Summary      Patient Name: Jean Bates  MRN: [id number]  ALKA: [id number]  Patient Class: OP- Observation  Admission Date: 5/5/2025  Hospital Length of Stay: 0 days  Discharge Date and Time: 5/6/2025 10:24 AM  Attending Physician: No att. providers found   Discharging Provider: Dara Lau NP  Primary Care Provider: Diann Arriaga NP    Primary Care Team: Networked reference to record PCT     HPI:   65-year-old gentleman with comorbid conditions of ETELVINA uses mouth guard, hypertension, depression, hyperlipidemia presents to the emergency department with complaints of nausea, vomiting, abdominal pain and diarrhea of 1 day duration.  Patient states that he was heavily drinking night before last at a wedding and subsequently felt poorly yesterday with the onset of GI symptoms.  Patient is hemodynamically stable, without hypotension or tachycardia.  He does appear dry.  Due to the significant epigastric pain that patient was complaining of CT imaging was ordered.  Some dilated bowel loops are present, no definitive evidence of small-bowel obstruction but findings consistent with enteritis.  Plan to admit to observation with supportive measures.  Patient afebrile and nontoxic appearing.    * No surgery found *      Hospital Course:   Patient monitored closely during observation stay. He received IV fluids, pain control, and analgesics. He is feeling better this am and is tolerating diet.  Denies N/V/D.   He is medically stable for Dc.      Goals of Care Treatment Preferences:  Code Status: Full Code      SDOH Screening:  The patient was screened for utility difficulties, food insecurity, transport difficulties, housing insecurity, and interpersonal safety and there were no concerns identified this admission.     Consults:     Assessment & Plan  Gastroenteritis  P/w n/v and diarrhea after attending wedding Saturday  Bowel sounds present, benign  abdomen  Supportive measures with antiemetics, analgesics and IVF  Admit to obs, anticipate patient may be able to be Dcd in AM    Primary insomnia  Resume home zolpidem    Recurrent major depressive disorder, in full remission  Resume home antidepressants      Hypertension  Patient's blood pressure range in the last 24 hours was: BP  Min: 110/79  Max: 158/82.The patient's inpatient anti-hypertensive regimen is listed below:  Current Antihypertensives  valsartan tablet 160 mg, Daily, Oral    Plan  - BP is controlled, no changes needed to their regimen    ETELVINA (obstructive sleep apnea)  Uses mouth guard, does not wish to use CPAP while hospitalized    Final Active Diagnoses:    Diagnosis Date Noted POA    PRINCIPAL PROBLEM:  Gastroenteritis [K52.9] 05/05/2025 Yes    Hypertension [I10] 05/05/2025 Yes    ETELVINA (obstructive sleep apnea) [G47.33] 05/05/2025 Yes    Recurrent major depressive disorder, in full remission [F33.42] 04/02/2023 Yes    Primary insomnia [F51.01] 03/13/2020 Yes      Problems Resolved During this Admission:       Discharged Condition: stable    Disposition: Home or Self Care    Follow Up:   Follow-up Information       Diann Arriaga NP. Go on 5/20/2025.    Specialty: Family Medicine  Why: @1020 for hospital follow up.  Contact information:  [address] [place] [id number] [phone]                           Patient Instructions:      Diet Cardiac     Notify your health care provider if you experience any of the following:  severe uncontrolled pain     Notify your health care provider if you experience any of the following:  redness, tenderness, or signs of infection (pain, swelling, redness, odor or green/yellow discharge around incision site)     Activity as tolerated       Significant Diagnostic Studies: Labs: All labs within the past 24 hours have been reviewed    Pending Diagnostic Studies:       Procedure Component Value Units Date/Time    EXTRA TUBES [[id number]] Collected:  05/06/25 0343    Order Status: Sent Lab Status: In process Updated: 05/06/25 0344    Specimen: Blood, Venous     Narrative:      The following orders were created for panel order EXTRA TUBES.  Procedure                               Abnormality         Status                     ---------                               -----------         ------                     Lavender Top Hold[[id number]]                               In process                   Please view results for these tests on the individual orders.           Medications:  Reconciled Home Medications:      Medication List        CONTINUE taking these medications      buPROPion 150 MG TBSR 12 hr tablet  Commonly known as: WELLBUTRIN SR  Take 1 tablet (150 mg total) by mouth 2 (two) times daily.     citalopram 20 MG tablet  Commonly known as: CeleXA  Take 1 tablet (20 mg total) by mouth once daily.     omeprazole 20 MG capsule  Commonly known as: PRILOSEC  Take 1 capsule (20 mg total) by mouth 2 (two) times a day.     rosuvastatin 5 MG tablet  Commonly known as: CRESTOR  Take 1 tablet (5 mg total) by mouth once daily.     sildenafiL 50 MG tablet  Commonly known as: VIAGRA  Take 1 tablet (50 mg total) by mouth daily as needed for Erectile Dysfunction.     valsartan 160 MG tablet  Commonly known as: DIOVAN  Take 1 tablet (160 mg total) by mouth once daily.     zolpidem 5 MG Tab  Commonly known as: AMBIEN  Take 1 tablet (5 mg total) by mouth every evening.            STOP taking these medications      fluticasone propionate 50 mcg/actuation nasal spray  Commonly known as: FLONASE     predniSONE 20 MG tablet  Commonly known as: DELTASONE              Indwelling Lines/Drains at time of discharge:   Lines/Drains/Airways       None                   Time spent on the discharge of patient: 45 minutes         Dara Lau NP  Department of Hospital Medicine  [facility]

## 2025-05-06 NOTE — SUBJECTIVE & OBJECTIVE
Past Medical History:   Diagnosis Date    Depression     GERD (gastroesophageal reflux disease)        Past Surgical History:   Procedure Laterality Date    HERNIA REPAIR         Review of patient's allergies indicates:  No Known Allergies    No current facility-administered medications on file prior to encounter.     Current Outpatient Medications on File Prior to Encounter   Medication Sig    buPROPion (WELLBUTRIN SR) 150 MG TBSR 12 hr tablet Take 1 tablet (150 mg total) by mouth 2 (two) times daily.    citalopram (CELEXA) 20 MG tablet Take 1 tablet (20 mg total) by mouth once daily.    fluticasone propionate (FLONASE) 50 mcg/actuation nasal spray 1 spray (50 mcg total) by Each Nostril route once daily.    omeprazole (PRILOSEC) 20 MG capsule Take 1 capsule (20 mg total) by mouth 2 (two) times a day.    predniSONE (DELTASONE) 20 MG tablet Take 1 tablet (20 mg total) by mouth every morning.    rosuvastatin (CRESTOR) 5 MG tablet Take 1 tablet (5 mg total) by mouth once daily.    sildenafiL (VIAGRA) 50 MG tablet Take 1 tablet (50 mg total) by mouth daily as needed for Erectile Dysfunction.    valsartan (DIOVAN) 160 MG tablet Take 1 tablet (160 mg total) by mouth once daily.    zolpidem (AMBIEN) 5 MG Tab Take 1 tablet (5 mg total) by mouth every evening.     Family History       Problem Relation (Age of Onset)    Alzheimer's disease Mother    Heart disease Mother    Hypertension Mother    Kidney disease Brother    No Known Problems Father          Tobacco Use    Smoking status: Never    Smokeless tobacco: Never   Substance and Sexual Activity    Alcohol use: Not Currently     Alcohol/week: 4.0 standard drinks of alcohol     Types: 4 Cans of beer per week     Comment: Drinks 2-4 beers daily    Drug use: Never    Sexual activity: Yes     Partners: Female     Review of Systems   Constitutional:  Negative for fever.   Respiratory:  Negative for shortness of breath.    Cardiovascular:  Negative for chest pain.    Gastrointestinal:  Positive for abdominal pain, diarrhea, nausea and vomiting.   Psychiatric/Behavioral:  Negative for agitation and confusion.      Objective:     Vital Signs (Most Recent):  Temp: 98.9 °F (37.2 °C) (05/05/25 1426)  Pulse: 80 (05/05/25 1900)  Resp: (!) 26 (05/05/25 1907)  BP: (!) 151/84 (05/05/25 1900)  SpO2: (!) 94 % (05/05/25 1900) Vital Signs (24h Range):  Temp:  [98.9 °F (37.2 °C)] 98.9 °F (37.2 °C)  Pulse:  [75-94] 80  Resp:  [16-26] 26  SpO2:  [90 %-97 %] 94 %  BP: (110-151)/(79-84) 151/84     Weight: 95.3 kg (210 lb)  Body mass index is 26.96 kg/m².     Physical Exam  Constitutional:       General: He is not in acute distress.     Appearance: Normal appearance. He is not toxic-appearing.   HENT:      Head: Atraumatic.      Mouth/Throat:      Mouth: Mucous membranes are dry.   Cardiovascular:      Rate and Rhythm: Normal rate and regular rhythm.   Pulmonary:      Effort: Pulmonary effort is normal.   Abdominal:      Palpations: Abdomen is soft.      Tenderness: There is no abdominal tenderness. There is no guarding or rebound.   Musculoskeletal:      Right lower leg: No edema.      Left lower leg: No edema.   Skin:     General: Skin is warm and dry.   Neurological:      General: No focal deficit present.      Mental Status: He is alert and oriented to person, place, and time.   Psychiatric:         Mood and Affect: Mood normal.         Behavior: Behavior normal.                Significant Labs: All pertinent labs within the past 24 hours have been reviewed.  BMP:   Recent Labs   Lab 05/05/25  1528   *   *   K 4.2      CO2 26   BUN 15   CREATININE 1.0   CALCIUM 9.5     CBC:   Recent Labs   Lab 05/05/25  1528   WBC 8.28   HGB 16.4   HCT 47.9          Significant Imaging: I have reviewed all pertinent imaging results/findings within the past 24 hours.

## 2025-05-06 NOTE — PLAN OF CARE
Patient clear once tolerating diet.   Appointment made and added to AVS.          05/06/25 0890   Final Note   Assessment Type Final Discharge Note   Anticipated Discharge Disposition Home   Hospital Resources/Appts/Education Provided Appointments scheduled and added to AVS

## 2025-05-06 NOTE — ASSESSMENT & PLAN NOTE
Patient's blood pressure range in the last 24 hours was: BP  Min: 110/79  Max: 158/82.The patient's inpatient anti-hypertensive regimen is listed below:  Current Antihypertensives  valsartan tablet 160 mg, Daily, Oral    Plan  - BP is controlled, no changes needed to their regimen

## 2025-05-06 NOTE — H&P
[facility] - Emergency Dept  Hospital Medicine  History & Physical    Patient Name: Jean Bates  MRN: [id number]  Patient Class: OP- Observation  Admission Date: 5/5/2025  Attending Physician: Radhika Hernandez MD   Primary Care Provider: Diann Arriaga NP         Patient information was obtained from patient and ER records.     Subjective:     Principal Problem:Gastroenteritis    Chief Complaint:   Chief Complaint   Patient presents with    Abdominal Pain     Generalized abdominal pain, vomiting, and diarrhea x 2 days        HPI: 65-year-old gentleman with comorbid conditions of ETELVINA uses mouth guard, hypertension, depression, hyperlipidemia presents to the emergency department with complaints of nausea, vomiting, abdominal pain and diarrhea of 1 day duration.  Patient states that he was heavily drinking night before last at a wedding and subsequently felt poorly yesterday with the onset of GI symptoms.  Patient is hemodynamically stable, without hypotension or tachycardia.  He does appear dry.  Due to the significant epigastric pain that patient was complaining of CT imaging was ordered.  Some dilated bowel loops are present, no definitive evidence of small-bowel obstruction but findings consistent with enteritis.  Plan to admit to observation with supportive measures.  Patient afebrile and nontoxic appearing.    Past Medical History:   Diagnosis Date    Depression     GERD (gastroesophageal reflux disease)        Past Surgical History:   Procedure Laterality Date    HERNIA REPAIR         Review of patient's allergies indicates:  No Known Allergies    No current facility-administered medications on file prior to encounter.     Current Outpatient Medications on File Prior to Encounter   Medication Sig    buPROPion (WELLBUTRIN SR) 150 MG TBSR 12 hr tablet Take 1 tablet (150 mg total) by mouth 2 (two) times daily.    citalopram (CELEXA) 20 MG tablet Take 1 tablet (20 mg total) by mouth once daily.     fluticasone propionate (FLONASE) 50 mcg/actuation nasal spray 1 spray (50 mcg total) by Each Nostril route once daily.    omeprazole (PRILOSEC) 20 MG capsule Take 1 capsule (20 mg total) by mouth 2 (two) times a day.    predniSONE (DELTASONE) 20 MG tablet Take 1 tablet (20 mg total) by mouth every morning.    rosuvastatin (CRESTOR) 5 MG tablet Take 1 tablet (5 mg total) by mouth once daily.    sildenafiL (VIAGRA) 50 MG tablet Take 1 tablet (50 mg total) by mouth daily as needed for Erectile Dysfunction.    valsartan (DIOVAN) 160 MG tablet Take 1 tablet (160 mg total) by mouth once daily.    zolpidem (AMBIEN) 5 MG Tab Take 1 tablet (5 mg total) by mouth every evening.     Family History       Problem Relation (Age of Onset)    Alzheimer's disease Mother    Heart disease Mother    Hypertension Mother    Kidney disease Brother    No Known Problems Father          Tobacco Use    Smoking status: Never    Smokeless tobacco: Never   Substance and Sexual Activity    Alcohol use: Not Currently     Alcohol/week: 4.0 standard drinks of alcohol     Types: 4 Cans of beer per week     Comment: Drinks 2-4 beers daily    Drug use: Never    Sexual activity: Yes     Partners: Female     Review of Systems   Constitutional:  Negative for fever.   Respiratory:  Negative for shortness of breath.    Cardiovascular:  Negative for chest pain.   Gastrointestinal:  Positive for abdominal pain, diarrhea, nausea and vomiting.   Psychiatric/Behavioral:  Negative for agitation and confusion.      Objective:     Vital Signs (Most Recent):  Temp: 98.9 °F (37.2 °C) (05/05/25 1426)  Pulse: 80 (05/05/25 1900)  Resp: (!) 26 (05/05/25 1907)  BP: (!) 151/84 (05/05/25 1900)  SpO2: (!) 94 % (05/05/25 1900) Vital Signs (24h Range):  Temp:  [98.9 °F (37.2 °C)] 98.9 °F (37.2 °C)  Pulse:  [75-94] 80  Resp:  [16-26] 26  SpO2:  [90 %-97 %] 94 %  BP: (110-151)/(79-84) 151/84     Weight: 95.3 kg (210 lb)  Body mass index is 26.96 kg/m².     Physical  Exam  Constitutional:       General: He is not in acute distress.     Appearance: Normal appearance. He is not toxic-appearing.   HENT:      Head: Atraumatic.      Mouth/Throat:      Mouth: Mucous membranes are dry.   Cardiovascular:      Rate and Rhythm: Normal rate and regular rhythm.   Pulmonary:      Effort: Pulmonary effort is normal.   Abdominal:      Palpations: Abdomen is soft.      Tenderness: There is no abdominal tenderness. There is no guarding or rebound.   Musculoskeletal:      Right lower leg: No edema.      Left lower leg: No edema.   Skin:     General: Skin is warm and dry.   Neurological:      General: No focal deficit present.      Mental Status: He is alert and oriented to person, place, and time.   Psychiatric:         Mood and Affect: Mood normal.         Behavior: Behavior normal.                Significant Labs: All pertinent labs within the past 24 hours have been reviewed.  BMP:   Recent Labs   Lab 05/05/25  1528   *   *   K 4.2      CO2 26   BUN 15   CREATININE 1.0   CALCIUM 9.5     CBC:   Recent Labs   Lab 05/05/25  1528   WBC 8.28   HGB 16.4   HCT 47.9          Significant Imaging: I have reviewed all pertinent imaging results/findings within the past 24 hours.  Assessment/Plan:     Assessment & Plan  Gastroenteritis  P/w n/v and diarrhea after attending wedding Saturday  Bowel sounds present, benign abdomen  Supportive measures with antiemetics, analgesics and IVF  Admit to obs, anticipate patient may be able to be Dcd in AM    Primary insomnia  Resume home zolpidem    Recurrent major depressive disorder, in full remission  Resume home antidepressants      Hypertension  Patient's blood pressure range in the last 24 hours was: BP  Min: 110/79  Max: 151/84.The patient's inpatient anti-hypertensive regimen is listed below:  Current Antihypertensives  valsartan tablet 160 mg, Daily, Oral    Plan  - BP is controlled, no changes needed to their regimen    ETELVINA  (obstructive sleep apnea)  Uses mouth guard, does not wish to use CPAP while hospitalized    VTE Risk Mitigation (From admission, onward)           Ordered     IP VTE LOW RISK PATIENT  Once         05/05/25 1951     Place sequential compression device  Until discontinued         05/05/25 1951                       On 05/05/2025, patient should be placed in hospital observation services under my care.             Radhika Hernandez MD  Department of Hospital Medicine  [facility] - Emergency Dept

## 2025-05-06 NOTE — ASSESSMENT & PLAN NOTE
Patient's blood pressure range in the last 24 hours was: BP  Min: 110/79  Max: 151/84.The patient's inpatient anti-hypertensive regimen is listed below:  Current Antihypertensives  valsartan tablet 160 mg, Daily, Oral    Plan  - BP is controlled, no changes needed to their regimen

## 2025-05-06 NOTE — PLAN OF CARE
[facility]  Initial Discharge Assessment       Primary Care Provider: Diann Arriaga NP    Admission Diagnosis: Epigastric abdominal pain [R10.13]    Admission Date: 5/5/2025  Expected Discharge Date: 5/6/2025    SW met with patient bedside. SW verified demographics, insurance, supports, and PCP.  Patient reported living in the home with spouse and adult child. Patient reported he drives self to appointments. SW assessed patient's needs. Patient is able to complete ADLs independently. Patient verified at home DME: none.  Patient verified No HH, No Dialysis, No Blood Thinners, and No Oxygen.     Patient verified pharmacy of choice: [facility] [address]  Patient confirmed Michelle Celestin, spouse, will be source of transportation at the time of discharge.     Transition of Care Barriers: None    Payor: BLUE CROSS BLUE SHIELD / Plan: BCBS ALL OUT OF STATE / Product Type: PPO /     Extended Emergency Contact Information  Primary Emergency Contact: MICHELLE CELESTIN  Mobile Phone: [phone]  Relation: Spouse   needed? No    Discharge Plan A: Home with family  Discharge Plan B: Home with family      [id number] [place] [facility] [address]  [address]  [place] [address]  Phone: [phone] Fax: [phone]      Initial Assessment (most recent)       Adult Discharge Assessment - 05/06/25 0921          Discharge Assessment    Assessment Type Discharge Planning Assessment     Confirmed/corrected address, phone number and insurance Yes     Confirmed Demographics Correct on Facesheet     Source of Information patient     When was your last doctors appointment? --   month ago    Communicated YESIKA with patient/caregiver Date not available/Unable to determine     Reason For Admission Gastroenteritis     People in Home child(grady), adult;spouse     Do you expect to return to your current living situation? Yes     Do you have help at home or someone to help you manage your care at home? No      Prior to hospitilization cognitive status: Unable to Assess     Current cognitive status: Alert/Oriented     Walking or Climbing Stairs Difficulty no     Dressing/Bathing Difficulty no     Home Accessibility wheelchair accessible     Home Layout Able to live on 1st floor     Equipment Currently Used at Home none     Readmission within 30 days? No     Patient currently being followed by outpatient case management? No     Do you currently have service(s) that help you manage your care at home? No     Do you take prescription medications? Yes     Do you have prescription coverage? Yes     Coverage BCBS     Do you have any problems affording any of your prescribed medications? No     Is the patient taking medications as prescribed? yes     Who is going to help you get home at discharge? Michelle Bates, spouse     How do you get to doctors appointments? car, drives self     Are you on dialysis? No     Do you take coumadin? No     Discharge Plan A Home with family     Discharge Plan B Home with family     DME Needed Upon Discharge  none     Discharge Plan discussed with: Patient     Transition of Care Barriers None

## 2025-05-07 ENCOUNTER — PATIENT OUTREACH (OUTPATIENT)
Dept: ADMINISTRATIVE | Facility: CLINIC | Age: 65
End: 2025-05-07
Payer: COMMERCIAL

## 2025-05-07 ENCOUNTER — TELEPHONE (OUTPATIENT)
Dept: FAMILY MEDICINE | Facility: CLINIC | Age: 65
End: 2025-05-07
Payer: COMMERCIAL

## 2025-05-07 ENCOUNTER — PATIENT OUTREACH (OUTPATIENT)
Dept: FAMILY MEDICINE | Facility: CLINIC | Age: 65
End: 2025-05-07
Payer: COMMERCIAL

## 2025-05-07 NOTE — PROGRESS NOTES
Discharge Information     Discharge Date:   5/6/25    Primary Discharge Diagnosis:  GI / n/v/d    Discharge Summary:  Reviewed      Medication & Order Review     Were medication changes made or new medications added?   No    If so, has the patient filled the prescriptions?  No     Was Home Health ordered? No    If so, has Home Health contacted patient and/or initiated services?  No    Name of Home Health Agency? N/A    Durable Medical Equipment ordered?  No     If so, has the DME provider contacted patient and delivered equipment?  N/A    Follow Up               Any problems since discharge? No    How is the patient feeling since returning home?      Have you set up recommended follow up appointments?  (cardiology, surgery, etc.)    Schedule Hospital Follow-up appointment within 7-14 days (preferably 7).      Notes:        Spoke with patient, states he is feeling fine since the discharge. Does not need anything else but will call if anything changes.       Juanita Jones

## 2025-05-07 NOTE — PROGRESS NOTES
C3 nurse attempted to contact Jean Erwinton for a TCC post hospital discharge follow up call. No answer. Left message on voicemail to return call . The patient has a scheduled HOSFU appointment with Diann Arriaga NP on 05/20/25 @ 9798.

## 2025-05-07 NOTE — PROGRESS NOTES
C3 nurse spoke with Jean Bates for a TCC post hospital discharge follow up call. The patient has a scheduled HOSFU appointment with Diann Arriaga NP on 05/20/25 @ 1020.

## 2025-05-07 NOTE — TELEPHONE ENCOUNTER
----- Message from Cedrick Grant sent at 5/7/2025  3:36 PM CDT -----  Call patient - needs post-hospital phone call within 2 business days and hospital follow up visit scheduled within 7-14 days.Scheduled 5/20

## 2025-05-07 NOTE — TELEPHONE ENCOUNTER
Spoke with patient, states he is feeling fine since the discharge. Does not need anything else but will call if anything changes.

## 2025-05-20 ENCOUNTER — OFFICE VISIT (OUTPATIENT)
Dept: FAMILY MEDICINE | Facility: CLINIC | Age: 65
End: 2025-05-20
Payer: COMMERCIAL

## 2025-05-20 VITALS
BODY MASS INDEX: 26.36 KG/M2 | HEIGHT: 74 IN | WEIGHT: 205.38 LBS | HEART RATE: 67 BPM | SYSTOLIC BLOOD PRESSURE: 120 MMHG | OXYGEN SATURATION: 97 % | DIASTOLIC BLOOD PRESSURE: 62 MMHG

## 2025-05-20 DIAGNOSIS — E78.5 HYPERLIPIDEMIA, UNSPECIFIED HYPERLIPIDEMIA TYPE: ICD-10-CM

## 2025-05-20 DIAGNOSIS — K52.9 GASTROENTERITIS: ICD-10-CM

## 2025-05-20 DIAGNOSIS — F33.42 RECURRENT MAJOR DEPRESSIVE DISORDER, IN FULL REMISSION: ICD-10-CM

## 2025-05-20 DIAGNOSIS — Z09 HOSPITAL DISCHARGE FOLLOW-UP: Primary | ICD-10-CM

## 2025-05-20 DIAGNOSIS — I10 ESSENTIAL HYPERTENSION: ICD-10-CM

## 2025-05-20 DIAGNOSIS — F51.01 PRIMARY INSOMNIA: ICD-10-CM

## 2025-05-20 PROCEDURE — 1159F MED LIST DOCD IN RCRD: CPT | Mod: CPTII,S$GLB,, | Performed by: NURSE PRACTITIONER

## 2025-05-20 PROCEDURE — 4010F ACE/ARB THERAPY RXD/TAKEN: CPT | Mod: CPTII,S$GLB,, | Performed by: NURSE PRACTITIONER

## 2025-05-20 PROCEDURE — 3074F SYST BP LT 130 MM HG: CPT | Mod: CPTII,S$GLB,, | Performed by: NURSE PRACTITIONER

## 2025-05-20 PROCEDURE — 1160F RVW MEDS BY RX/DR IN RCRD: CPT | Mod: CPTII,S$GLB,, | Performed by: NURSE PRACTITIONER

## 2025-05-20 PROCEDURE — 99495 TRANSJ CARE MGMT MOD F2F 14D: CPT | Mod: S$GLB,,, | Performed by: NURSE PRACTITIONER

## 2025-05-20 PROCEDURE — 3078F DIAST BP <80 MM HG: CPT | Mod: CPTII,S$GLB,, | Performed by: NURSE PRACTITIONER

## 2025-07-07 RX ORDER — VALSARTAN 160 MG/1
160 TABLET ORAL DAILY
Qty: 30 TABLET | Refills: 5 | Status: SHIPPED | OUTPATIENT
Start: 2025-07-07

## 2025-07-10 DIAGNOSIS — F51.01 PRIMARY INSOMNIA: ICD-10-CM

## 2025-07-10 RX ORDER — ZOLPIDEM TARTRATE 5 MG/1
5 TABLET ORAL NIGHTLY
Qty: 90 TABLET | Refills: 0 | Status: SHIPPED | OUTPATIENT
Start: 2025-07-10

## 2025-07-14 ENCOUNTER — TELEPHONE (OUTPATIENT)
Dept: FAMILY MEDICINE | Facility: CLINIC | Age: 65
End: 2025-07-14

## 2025-07-14 ENCOUNTER — OFFICE VISIT (OUTPATIENT)
Dept: FAMILY MEDICINE | Facility: CLINIC | Age: 65
End: 2025-07-14
Payer: COMMERCIAL

## 2025-07-14 VITALS
HEART RATE: 93 BPM | DIASTOLIC BLOOD PRESSURE: 76 MMHG | BODY MASS INDEX: 26.9 KG/M2 | HEIGHT: 74 IN | WEIGHT: 209.63 LBS | SYSTOLIC BLOOD PRESSURE: 124 MMHG

## 2025-07-14 DIAGNOSIS — M54.50 ACUTE MIDLINE LOW BACK PAIN WITHOUT SCIATICA: Primary | ICD-10-CM

## 2025-07-14 PROCEDURE — 99213 OFFICE O/P EST LOW 20 MIN: CPT | Mod: S$GLB,,,

## 2025-07-14 PROCEDURE — 3078F DIAST BP <80 MM HG: CPT | Mod: CPTII,S$GLB,,

## 2025-07-14 PROCEDURE — 1160F RVW MEDS BY RX/DR IN RCRD: CPT | Mod: CPTII,S$GLB,,

## 2025-07-14 PROCEDURE — 1159F MED LIST DOCD IN RCRD: CPT | Mod: CPTII,S$GLB,,

## 2025-07-14 PROCEDURE — 3074F SYST BP LT 130 MM HG: CPT | Mod: CPTII,S$GLB,,

## 2025-07-14 PROCEDURE — 4010F ACE/ARB THERAPY RXD/TAKEN: CPT | Mod: CPTII,S$GLB,,

## 2025-07-14 PROCEDURE — 3008F BODY MASS INDEX DOCD: CPT | Mod: CPTII,S$GLB,,

## 2025-07-14 RX ORDER — TRAMADOL HYDROCHLORIDE 50 MG/1
50 TABLET, FILM COATED ORAL EVERY 6 HOURS PRN
Qty: 28 TABLET | Refills: 0 | Status: SHIPPED | OUTPATIENT
Start: 2025-07-14

## 2025-07-14 RX ORDER — METHOCARBAMOL 750 MG/1
750 TABLET, FILM COATED ORAL 3 TIMES DAILY PRN
Qty: 60 TABLET | Refills: 1 | Status: SHIPPED | OUTPATIENT
Start: 2025-07-14 | End: 2025-07-24

## 2025-07-14 RX ORDER — METHYLPREDNISOLONE 4 MG/1
TABLET ORAL
Qty: 21 EACH | Refills: 0 | Status: SHIPPED | OUTPATIENT
Start: 2025-07-14 | End: 2025-08-04

## 2025-07-14 RX ORDER — IBUPROFEN 800 MG/1
800 TABLET, FILM COATED ORAL EVERY 8 HOURS PRN
Qty: 90 TABLET | Refills: 0 | Status: SHIPPED | OUTPATIENT
Start: 2025-07-14

## 2025-07-14 NOTE — PROGRESS NOTES
SUBJECTIVE:    Patient ID: Jean Bates is a 65 y.o. male.    Chief Complaint: Back Pain (Patient c/o back pain; had it for years and it has gotten worse; tried advil, ice and heat; it helped some; no bottles ; no tefills)    Back Pain      History of Present Illness    CHIEF COMPLAINT:  Jean presents today for severe back pain with acute flare-up.    HISTORY OF PRESENT ILLNESS:  He reports a 20-year history of chronic degenerative back problems that are progressively worsening. Pain is located in the L5 and sacrum area, with no associated sciatica or leg pain. During flare-ups, muscles tighten near the area, becoming unbearable. Current episode is not as severe as it was yesterday and Saturday. He denies pain with straight leg lift. He currently sees a chiropractor but can no longer tolerate the traction table due to increased pain following treatment. The chiropractor is referring him to another physician for potential epidural injection. He is hesitant about surgical intervention, preferring to exhaust conservative treatment options.    IMAGING:  Recent CT revealed severe narrowing of right foraminal openings and moderate narrowing of left foraminal openings at L5-S1 level.    MEDICATIONS:  He is taking ibuprofen frequently for pain management, describing his medication intake as taking Advil frequently to manage symptoms. Muscle relaxants such as Robaxin (methocarbamol) have been used in the past.      ROS:  General: -fever, -chills, -fatigue, -weight gain, -weight loss  Eyes: -vision changes, -redness, -discharge  ENT: -ear pain, -nasal congestion, -sore throat  Cardiovascular: -chest pain, -palpitations, -lower extremity edema  Respiratory: -cough, -shortness of breath  Gastrointestinal: -abdominal pain, -nausea, -vomiting, -diarrhea, -constipation, -blood in stool, +hernias  Genitourinary: -dysuria, -hematuria, -frequency  Musculoskeletal: -joint pain, -muscle pain, +back pain, +muscle spasms, +muscle  tension  Skin: -rash, -lesion  Neurological: -headache, -dizziness, -numbness, -tingling  Psychiatric: -anxiety, -depression, -sleep difficulty         Admission on 05/05/2025, Discharged on 05/06/2025   Component Date Value Ref Range Status    Sodium 05/05/2025 135 (L)  136 - 145 mmol/L Final    Potassium 05/05/2025 4.2  3.5 - 5.1 mmol/L Final    Chloride 05/05/2025 100  95 - 110 mmol/L Final    CO2 05/05/2025 26  23 - 29 mmol/L Final    Glucose 05/05/2025 131 (H)  70 - 110 mg/dL Final    BUN 05/05/2025 15  8 - 23 mg/dL Final    Creatinine 05/05/2025 1.0  0.5 - 1.4 mg/dL Final    Calcium 05/05/2025 9.5  8.7 - 10.5 mg/dL Final    Protein Total 05/05/2025 6.8  6.0 - 8.4 gm/dL Final    Albumin 05/05/2025 4.1  3.5 - 5.2 g/dL Final    Bilirubin Total 05/05/2025 0.9  0.1 - 1.0 mg/dL Final    ALP 05/05/2025 54 (L)  55 - 135 unit/L Final    AST 05/05/2025 20  10 - 40 unit/L Final    ALT 05/05/2025 21  10 - 44 unit/L Final    Anion Gap 05/05/2025 9  8 - 16 mmol/L Final    eGFR 05/05/2025 >60  >60 mL/min/1.73/m2 Final    Lipase Level 05/05/2025 9  4 - 60 U/L Final    WBC 05/05/2025 8.28  3.90 - 12.70 K/uL Final    RBC 05/05/2025 5.19  4.60 - 6.20 M/uL Final    Hgb 05/05/2025 16.4  14.0 - 18.0 gm/dL Final    Hct 05/05/2025 47.9  40.0 - 54.0 % Final    MCV 05/05/2025 92  82 - 98 fL Final    MCH 05/05/2025 31.6 (H)  27.0 - 31.0 pg Final    MCHC 05/05/2025 34.2  32.0 - 36.0 g/dL Final    RDW 05/05/2025 12.8  11.5 - 14.5 % Final    Platelet Count 05/05/2025 225  150 - 450 K/uL Final    MPV 05/05/2025 10.0  9.2 - 12.9 fL Final    Nucleated RBC 05/05/2025 0  <=0 /100 WBC Final    Neut % 05/05/2025 83.3 (H)  38 - 73 % Final    Lymph % 05/05/2025 8.6 (L)  18 - 48 % Final    Mono % 05/05/2025 7.4  4 - 15 % Final    Eos % 05/05/2025 0.1  0 - 8 % Final    Basophil % 05/05/2025 0.2  <=1.9 % Final    Imm Grans % 05/05/2025 0.4  0.0 - 0.5 % Final    Neut # 05/05/2025 6.9  1.8 - 7.7 K/uL Final    Lymph # 05/05/2025 0.71 (L)  1 - 4.8 K/uL  Final    Mono # 05/05/2025 0.61  0.3 - 1 K/uL Final    Eos # 05/05/2025 0.01  <=0.5 K/uL Final    Baso # 05/05/2025 0.02  <=0.2 K/uL Final    Imm Grans # 05/05/2025 0.03  0.00 - 0.04 K/uL Final    Troponin High Sensitive 05/05/2025 3.6  <=14.9 pg/mL Final    QRS Duration 05/05/2025 78  ms Final    [facility] QTC Calculation 05/05/2025 420  ms Final    Sodium 05/06/2025 137  136 - 145 mmol/L Final    Potassium 05/06/2025 4.6  3.5 - 5.1 mmol/L Final    Chloride 05/06/2025 104  95 - 110 mmol/L Final    CO2 05/06/2025 26  23 - 29 mmol/L Final    Glucose 05/06/2025 118 (H)  70 - 110 mg/dL Final    BUN 05/06/2025 16  8 - 23 mg/dL Final    Creatinine 05/06/2025 0.9  0.5 - 1.4 mg/dL Final    Calcium 05/06/2025 9.0  8.7 - 10.5 mg/dL Final    Anion Gap 05/06/2025 7 (L)  8 - 16 mmol/L Final    eGFR 05/06/2025 >60  >60 mL/min/1.73/m2 Final   Patient Message on 02/05/2025   Component Date Value Ref Range Status    Glucose 02/10/2025 95  65 - 99 mg/dL Final    BUN 02/10/2025 16  7 - 25 mg/dL Final    Creatinine 02/10/2025 0.98  0.70 - 1.35 mg/dL Final    eGFR 02/10/2025 87  > OR = 60 mL/min/1.73m2 Final    BUN/Creatinine Ratio 02/10/2025 SEE NOTE:  6 - 22 (calc) Final    Sodium 02/10/2025 139  135 - 146 mmol/L Final    Potassium 02/10/2025 5.3  3.5 - 5.3 mmol/L Final    Chloride 02/10/2025 105  98 - 110 mmol/L Final    CO2 02/10/2025 27  20 - 32 mmol/L Final    Calcium 02/10/2025 9.5  8.6 - 10.3 mg/dL Final    Total Protein 02/10/2025 6.5  6.1 - 8.1 g/dL Final    Albumin 02/10/2025 4.2  3.6 - 5.1 g/dL Final    Globulin, Total 02/10/2025 2.3  1.9 - 3.7 g/dL (calc) Final    Albumin/Globulin Ratio 02/10/2025 1.8  1.0 - 2.5 (calc) Final    Total Bilirubin 02/10/2025 0.8  0.2 - 1.2 mg/dL Final    Alkaline Phosphatase 02/10/2025 76  35 - 144 U/L Final    AST 02/10/2025 24  10 - 35 U/L Final    ALT 02/10/2025 25  9 - 46 U/L Final    Cholesterol 02/10/2025 167  <200 mg/dL Final    HDL 02/10/2025 58  > OR = 40 mg/dL Final    Triglycerides  "02/10/2025 188 (H)  <150 mg/dL Final    LDL Cholesterol 02/10/2025 81  mg/dL (calc) Final    HDL/Cholesterol Ratio 02/10/2025 2.9  <5.0 (calc) Final    Non HDL Chol. (LDL+VLDL) 02/10/2025 109  <130 mg/dL (calc) Final       Past Medical History:   Diagnosis Date    Depression     GERD (gastroesophageal reflux disease)      Social History[1]  Past Surgical History:   Procedure Laterality Date    HERNIA REPAIR       Family History   Problem Relation Name Age of Onset    Hypertension Mother      Heart disease Mother      Alzheimer's disease Mother      No Known Problems Father      Kidney disease Brother          Renal stones       The 10-year CVD risk score (Yamile, et al., 2008) is: 15.3%    Values used to calculate the score:      Age: 65 years      Sex: Male      Diabetic: No      Tobacco smoker: No      Systolic Blood Pressure: 124 mmHg      Is BP treated: Yes      HDL Cholesterol: 58 mg/dL      Total Cholesterol: 167 mg/dL    All of your core healthy metrics are met.      Review of patient's allergies indicates:  No Known Allergies  Current Medications[2]    Review of Systems   Musculoskeletal:  Positive for back pain.           Objective:      Vitals:    07/14/25 1540   BP: 124/76   Pulse: 93   Weight: 95.1 kg (209 lb 9.6 oz)   Height: 6' 2" (1.88 m)     Physical Exam  Constitutional:       General: He is in acute distress.      Comments: In apparent discomfort related to lower back pain. Is lying supine on exam table upon my entering room   HENT:      Head: Normocephalic and atraumatic.   Cardiovascular:      Rate and Rhythm: Normal rate.   Pulmonary:      Effort: Pulmonary effort is normal.   Musculoskeletal:         General: Tenderness present.      Cervical back: Normal range of motion.      Comments: Supraspinal muscle tenderness   Skin:     General: Skin is warm and dry.      Capillary Refill: Capillary refill takes less than 2 seconds.   Neurological:      Mental Status: He is alert. Mental status is " at baseline.      Gait: Gait abnormal.      Comments: Walking stiffly due to back pain                    Assessment:       1. Acute midline low back pain without sciatica         Plan:       Acute midline low back pain without sciatica  -     methylPREDNISolone (MEDROL DOSEPACK) 4 mg tablet; use as directed  Dispense: 21 each; Refill: 0  -     methocarbamoL (ROBAXIN) 750 MG Tab; Take 1 tablet (750 mg total) by mouth 3 (three) times daily as needed (back spasms).  Dispense: 60 tablet; Refill: 1  -     ibuprofen (ADVIL,MOTRIN) 800 MG tablet; Take 1 tablet (800 mg total) by mouth every 8 (eight) hours as needed for Pain.  Dispense: 90 tablet; Refill: 0  -     traMADoL (ULTRAM) 50 mg tablet; Take 1 tablet (50 mg total) by mouth every 6 (six) hours as needed for Pain.  Dispense: 28 tablet; Refill: 0    ## LUMBAR BACK PAIN:  - Assessed acute flare-up of chronic lower back pain.  - Jean has been experiencing back problems for 20 years, with worsening symptoms.  - Jean reports severe narrowing of foraminal openings on the right side and moderate narrowing on the left side, causing pain in the L5 and sacrum area.  - Confirmed no sciatica, with pain strictly in the back.  - Jean experiences muscle tightening when pain gets bad.  - Initiated conservative management with oral medications due to patient's preference to avoid injections.  - Explained potential side effects of steroid medication, including jitteriness and restlessness.  - Prescribed Medrol Dosepak (oral steroid taper) to begin first thing tomorrow morning, to be taken over 6 days.  - Prescribed methocarbamol (Robaxin) up to 3 times daily as needed for muscle relaxation.  - Prescribed opioid pain medication as needed for significant pain.  - Instructed the patient to apply heat to affected area.  - Referred to [facility] for further assessment and treatment options, including possible epidural injection.                  This note was  generated with the assistance of ambient listening technology. Verbal consent was obtained by the patient and accompanying visitor(s) for the recording of patient appointment to facilitate this note. I attest to having reviewed and edited the generated note for accuracy, though some syntax or spelling errors may persist. Please contact the author of this note for any clarification.      7/14/2025 Dara Kumari         [1]   Social History  Socioeconomic History    Marital status:    Tobacco Use    Smoking status: Never    Smokeless tobacco: Never   Substance and Sexual Activity    Alcohol use: Not Currently     Alcohol/week: 4.0 standard drinks of alcohol     Types: 4 Cans of beer per week     Comment: Drinks 2-4 beers daily    Drug use: Never    Sexual activity: Yes     Partners: Female     Social Drivers of Health     Financial Resource Strain: Low Risk  (5/5/2025)    Overall Financial Resource Strain (CARDIA)     Difficulty of Paying Living Expenses: Not hard at all   Food Insecurity: No Food Insecurity (5/5/2025)    Hunger Vital Sign     Worried About Running Out of Food in the Last Year: Never true     Ran Out of Food in the Last Year: Never true   Transportation Needs: No Transportation Needs (5/5/2025)    PRAPARE - Transportation     Lack of Transportation (Medical): No     Lack of Transportation (Non-Medical): No   Stress: No Stress Concern Present (5/5/2025)    [employer] - Occupational Stress Questionnaire     Feeling of Stress : Not at all   Housing Stability: Low Risk  (5/5/2025)    Housing Stability Vital Sign     Unable to Pay for Housing in the Last Year: No     Homeless in the Last Year: No   [2]   Current Outpatient Medications:     buPROPion (WELLBUTRIN SR) 150 MG TBSR 12 hr tablet, Take 1 tablet (150 mg total) by mouth 2 (two) times daily., Disp: 60 tablet, Rfl: 5    citalopram (CELEXA) 20 MG tablet, Take 1 tablet (20 mg total) by mouth once daily., Disp: 90  tablet, Rfl: 1    omeprazole (PRILOSEC) 20 MG capsule, Take 1 capsule (20 mg total) by mouth 2 (two) times a day., Disp: 60 capsule, Rfl: 11    rosuvastatin (CRESTOR) 5 MG tablet, Take 1 tablet (5 mg total) by mouth once daily., Disp: 90 tablet, Rfl: 3    valsartan (DIOVAN) 160 MG tablet, Take 1 tablet (160 mg total) by mouth once daily., Disp: 30 tablet, Rfl: 5    zolpidem (AMBIEN) 5 MG Tab, Take 1 tablet (5 mg total) by mouth every evening., Disp: 90 tablet, Rfl: 0    ibuprofen (ADVIL,MOTRIN) 800 MG tablet, Take 1 tablet (800 mg total) by mouth every 8 (eight) hours as needed for Pain., Disp: 90 tablet, Rfl: 0    methocarbamoL (ROBAXIN) 750 MG Tab, Take 1 tablet (750 mg total) by mouth 3 (three) times daily as needed (back spasms)., Disp: 60 tablet, Rfl: 1    methylPREDNISolone (MEDROL DOSEPACK) 4 mg tablet, use as directed, Disp: 21 each, Rfl: 0    sildenafiL (VIAGRA) 50 MG tablet, Take 1 tablet (50 mg total) by mouth daily as needed for Erectile Dysfunction. (Patient not taking: Reported on 7/14/2025), Disp: 15 tablet, Rfl: 1    traMADoL (ULTRAM) 50 mg tablet, Take 1 tablet (50 mg total) by mouth every 6 (six) hours as needed for Pain., Disp: 28 tablet, Rfl: 0

## 2025-07-14 NOTE — TELEPHONE ENCOUNTER
----- Message from Tashia sent at 7/14/2025  8:08 AM CDT -----  Pt needs to be seen asap. Back pain. Pt #[phone]

## 2025-08-16 DIAGNOSIS — F33.42 RECURRENT MAJOR DEPRESSIVE DISORDER, IN FULL REMISSION: ICD-10-CM

## 2025-08-18 RX ORDER — CITALOPRAM 20 MG/1
20 TABLET ORAL DAILY
Qty: 90 TABLET | Refills: 1 | Status: SHIPPED | OUTPATIENT
Start: 2025-08-18 | End: 2026-08-18

## 2025-08-21 ENCOUNTER — TELEPHONE (OUTPATIENT)
Dept: FAMILY MEDICINE | Facility: CLINIC | Age: 65
End: 2025-08-21

## 2025-08-27 DIAGNOSIS — F33.42 RECURRENT MAJOR DEPRESSIVE DISORDER, IN FULL REMISSION: ICD-10-CM

## 2025-08-27 RX ORDER — BUPROPION HYDROCHLORIDE 150 MG/1
150 TABLET, EXTENDED RELEASE ORAL 2 TIMES DAILY
Qty: 60 TABLET | Refills: 5 | Status: SHIPPED | OUTPATIENT
Start: 2025-08-27 | End: 2026-08-27